# Patient Record
Sex: MALE | Race: WHITE | NOT HISPANIC OR LATINO | Employment: OTHER | URBAN - NONMETROPOLITAN AREA
[De-identification: names, ages, dates, MRNs, and addresses within clinical notes are randomized per-mention and may not be internally consistent; named-entity substitution may affect disease eponyms.]

---

## 2023-01-01 ENCOUNTER — APPOINTMENT (OUTPATIENT)
Dept: RADIOLOGY | Facility: HOSPITAL | Age: 73
End: 2023-01-01
Payer: MEDICARE

## 2023-01-01 ENCOUNTER — APPOINTMENT (OUTPATIENT)
Dept: RADIOLOGY | Facility: HOSPITAL | Age: 73
DRG: 637 | End: 2023-01-01
Payer: MEDICARE

## 2023-01-01 ENCOUNTER — HOSPITAL ENCOUNTER (INPATIENT)
Facility: HOSPITAL | Age: 73
LOS: 1 days | DRG: 951 | End: 2023-11-25
Attending: STUDENT IN AN ORGANIZED HEALTH CARE EDUCATION/TRAINING PROGRAM | Admitting: STUDENT IN AN ORGANIZED HEALTH CARE EDUCATION/TRAINING PROGRAM
Payer: OTHER MISCELLANEOUS

## 2023-01-01 ENCOUNTER — APPOINTMENT (OUTPATIENT)
Dept: CARDIOLOGY | Facility: HOSPITAL | Age: 73
DRG: 637 | End: 2023-01-01
Payer: MEDICARE

## 2023-01-01 ENCOUNTER — HOSPITAL ENCOUNTER (EMERGENCY)
Facility: HOSPITAL | Age: 73
Discharge: OTHER INSTITUTION | End: 2023-11-18
Attending: FAMILY MEDICINE
Payer: MEDICARE

## 2023-01-01 ENCOUNTER — ANCILLARY PROCEDURE (OUTPATIENT)
Dept: RADIOLOGY | Facility: HOSPITAL | Age: 73
DRG: 637 | End: 2023-01-01
Payer: MEDICARE

## 2023-01-01 ENCOUNTER — HOSPITAL ENCOUNTER (OUTPATIENT)
Dept: CARDIOLOGY | Facility: HOSPITAL | Age: 73
Discharge: HOME | End: 2023-11-19
Payer: MEDICARE

## 2023-01-01 ENCOUNTER — HOSPITAL ENCOUNTER (OUTPATIENT)
Dept: CARDIOLOGY | Facility: HOSPITAL | Age: 73
Discharge: HOME | End: 2023-11-21
Payer: MEDICARE

## 2023-01-01 ENCOUNTER — HOSPITAL ENCOUNTER (INPATIENT)
Facility: HOSPITAL | Age: 73
LOS: 7 days | Discharge: HOSPICE | DRG: 637 | End: 2023-11-25
Attending: FAMILY MEDICINE | Admitting: INTERNAL MEDICINE
Payer: MEDICARE

## 2023-01-01 VITALS
WEIGHT: 266.76 LBS | BODY MASS INDEX: 36.13 KG/M2 | SYSTOLIC BLOOD PRESSURE: 137 MMHG | HEART RATE: 102 BPM | RESPIRATION RATE: 18 BRPM | TEMPERATURE: 96.6 F | OXYGEN SATURATION: 99 % | DIASTOLIC BLOOD PRESSURE: 99 MMHG | HEIGHT: 72 IN

## 2023-01-01 VITALS
RESPIRATION RATE: 22 BRPM | DIASTOLIC BLOOD PRESSURE: 75 MMHG | WEIGHT: 289.68 LBS | HEIGHT: 74 IN | BODY MASS INDEX: 37.18 KG/M2 | TEMPERATURE: 102.4 F | SYSTOLIC BLOOD PRESSURE: 152 MMHG | OXYGEN SATURATION: 94 % | HEART RATE: 98 BPM

## 2023-01-01 DIAGNOSIS — E11.10 DKA, TYPE 2, NOT AT GOAL (MULTI): ICD-10-CM

## 2023-01-01 DIAGNOSIS — K92.1 BLACK STOOL: ICD-10-CM
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
## 2023-01-01 DIAGNOSIS — R74.8 ELEVATED CK: ICD-10-CM

## 2023-01-01 DIAGNOSIS — R41.82 ALTERED MENTAL STATUS, UNSPECIFIED ALTERED MENTAL STATUS TYPE: Primary | ICD-10-CM

## 2023-01-01 DIAGNOSIS — T07.XXXA ABRASIONS OF MULTIPLE SITES: ICD-10-CM

## 2023-01-01 DIAGNOSIS — I48.91 ATRIAL FIBRILLATION WITH RAPID VENTRICULAR RESPONSE (MULTI): ICD-10-CM

## 2023-01-01 DIAGNOSIS — R79.89 ELEVATED TROPONIN: ICD-10-CM

## 2023-01-01 DIAGNOSIS — N17.9 ACUTE RENAL FAILURE, UNSPECIFIED ACUTE RENAL FAILURE TYPE (CMS-HCC): ICD-10-CM

## 2023-01-01 DIAGNOSIS — E11.10 DKA, TYPE 2, NOT AT GOAL (MULTI): Primary | ICD-10-CM

## 2023-01-01 DIAGNOSIS — T68.XXXA HYPOTHERMIA, INITIAL ENCOUNTER: ICD-10-CM

## 2023-01-01 DIAGNOSIS — I48.91 ATRIAL FIBRILLATION, UNSPECIFIED TYPE (MULTI): ICD-10-CM

## 2023-01-01 DIAGNOSIS — R73.9 HYPERGLYCEMIA: ICD-10-CM

## 2023-01-01 LAB
ALBUMIN SERPL BCP-MCNC: 2.7 G/DL (ref 3.4–5)
ALBUMIN SERPL BCP-MCNC: 2.8 G/DL (ref 3.4–5)
ALBUMIN SERPL BCP-MCNC: 2.9 G/DL (ref 3.4–5)
ALBUMIN SERPL BCP-MCNC: 2.9 G/DL (ref 3.4–5)
ALBUMIN SERPL BCP-MCNC: 3 G/DL (ref 3.4–5)
ALBUMIN SERPL BCP-MCNC: 3.2 G/DL (ref 3.4–5)
ALBUMIN SERPL BCP-MCNC: 3.3 G/DL (ref 3.4–5)
ALBUMIN SERPL BCP-MCNC: 3.3 G/DL (ref 3.4–5)
ALBUMIN SERPL BCP-MCNC: 3.5 G/DL (ref 3.4–5)
ALBUMIN SERPL BCP-MCNC: 3.6 G/DL (ref 3.4–5)
ALBUMIN SERPL BCP-MCNC: 3.6 G/DL (ref 3.4–5)
ALBUMIN SERPL BCP-MCNC: 3.8 G/DL (ref 3.4–5)
ALBUMIN SERPL BCP-MCNC: 4.3 G/DL (ref 3.4–5)
ALP SERPL-CCNC: 114 U/L (ref 33–136)
ALP SERPL-CCNC: 129 U/L (ref 33–136)
ALP SERPL-CCNC: 138 U/L (ref 33–136)
ALP SERPL-CCNC: 81 U/L (ref 33–136)
ALP SERPL-CCNC: 91 U/L (ref 33–136)
ALT SERPL W P-5'-P-CCNC: 16 U/L (ref 10–52)
ALT SERPL W P-5'-P-CCNC: 17 U/L (ref 10–52)
ALT SERPL W P-5'-P-CCNC: 18 U/L (ref 10–52)
ALT SERPL W P-5'-P-CCNC: 29 U/L (ref 10–52)
ALT SERPL W P-5'-P-CCNC: 57 U/L (ref 10–52)
AMMONIA PLAS-SCNC: 34 UMOL/L (ref 16–53)
ANION GAP BLDA CALCULATED.4IONS-SCNC: 3 MMO/L (ref 10–25)
ANION GAP BLDA CALCULATED.4IONS-SCNC: 9 MMO/L (ref 10–25)
ANION GAP SERPL CALC-SCNC: 11 MMOL/L (ref 10–20)
ANION GAP SERPL CALC-SCNC: 11 MMOL/L (ref 10–20)
ANION GAP SERPL CALC-SCNC: 12 MMOL/L (ref 10–20)
ANION GAP SERPL CALC-SCNC: 13 MMOL/L (ref 10–20)
ANION GAP SERPL CALC-SCNC: 14 MMOL/L (ref 10–20)
ANION GAP SERPL CALC-SCNC: 15 MMOL/L (ref 10–20)
ANION GAP SERPL CALC-SCNC: 19 MMOL/L (ref 10–20)
ANION GAP SERPL CALC-SCNC: 21 MMOL/L (ref 10–20)
ANION GAP SERPL CALC-SCNC: 22 MMOL/L (ref 10–20)
ANION GAP SERPL CALC-SCNC: 27 MMOL/L (ref 10–20)
AORTIC VALVE MEAN GRADIENT: 19
AORTIC VALVE PEAK VELOCITY: 2.93
APPEARANCE UR: CLEAR
APTT PPP: 33 SECONDS (ref 27–38)
AST SERPL W P-5'-P-CCNC: 109 U/L (ref 9–39)
AST SERPL W P-5'-P-CCNC: 21 U/L (ref 9–39)
AST SERPL W P-5'-P-CCNC: 24 U/L (ref 9–39)
AST SERPL W P-5'-P-CCNC: 26 U/L (ref 9–39)
AST SERPL W P-5'-P-CCNC: 40 U/L (ref 9–39)
AV PEAK GRADIENT: 34.3
AVA (PEAK VEL): 1.07
AVA (VTI): 1.25
B-OH-BUTYR SERPL-SCNC: 3.74 MMOL/L (ref 0.02–0.27)
BACTERIA #/AREA URNS AUTO: ABNORMAL /HPF
BACTERIA BLD CULT: NORMAL
BACTERIA BLD CULT: NORMAL
BACTERIA UR CULT: ABNORMAL
BASE EXCESS BLDA CALC-SCNC: -2.2 MMOL/L (ref -2–3)
BASE EXCESS BLDA CALC-SCNC: 7.2 MMOL/L (ref -2–3)
BASE EXCESS BLDV CALC-SCNC: -13 MMOL/L (ref -2–3)
BASE EXCESS BLDV CALC-SCNC: -4.2 MMOL/L (ref -2–3)
BASOPHILS # BLD AUTO: 0.02 X10*3/UL (ref 0–0.1)
BASOPHILS NFR BLD AUTO: 0.1 %
BILIRUB SERPL-MCNC: 0.4 MG/DL (ref 0–1.2)
BILIRUB SERPL-MCNC: 0.5 MG/DL (ref 0–1.2)
BILIRUB SERPL-MCNC: 0.6 MG/DL (ref 0–1.2)
BILIRUB UR STRIP.AUTO-MCNC: NEGATIVE MG/DL
BODY SURFACE AREA: 2.51 M2
BODY TEMPERATURE: ABNORMAL
BUN SERPL-MCNC: 16 MG/DL (ref 6–23)
BUN SERPL-MCNC: 17 MG/DL (ref 6–23)
BUN SERPL-MCNC: 21 MG/DL (ref 6–23)
BUN SERPL-MCNC: 25 MG/DL (ref 6–23)
BUN SERPL-MCNC: 26 MG/DL (ref 6–23)
BUN SERPL-MCNC: 32 MG/DL (ref 6–23)
BUN SERPL-MCNC: 40 MG/DL (ref 6–23)
BUN SERPL-MCNC: 66 MG/DL (ref 6–23)
BUN SERPL-MCNC: 73 MG/DL (ref 6–23)
BUN SERPL-MCNC: 82 MG/DL (ref 6–23)
BUN SERPL-MCNC: 82 MG/DL (ref 6–23)
BUN SERPL-MCNC: 83 MG/DL (ref 6–23)
BUN SERPL-MCNC: 84 MG/DL (ref 6–23)
BUN SERPL-MCNC: 84 MG/DL (ref 6–23)
BUN SERPL-MCNC: 85 MG/DL (ref 6–23)
CA-I BLDA-SCNC: 1.17 MMOL/L (ref 1.1–1.33)
CA-I BLDA-SCNC: 1.41 MMOL/L (ref 1.1–1.33)
CALCIUM SERPL-MCNC: 10.2 MG/DL (ref 8.6–10.3)
CALCIUM SERPL-MCNC: 11 MG/DL (ref 8.6–10.3)
CALCIUM SERPL-MCNC: 8.4 MG/DL (ref 8.6–10.3)
CALCIUM SERPL-MCNC: 8.5 MG/DL (ref 8.6–10.3)
CALCIUM SERPL-MCNC: 8.6 MG/DL (ref 8.6–10.3)
CALCIUM SERPL-MCNC: 8.7 MG/DL (ref 8.6–10.3)
CALCIUM SERPL-MCNC: 8.7 MG/DL (ref 8.6–10.3)
CALCIUM SERPL-MCNC: 9.3 MG/DL (ref 8.6–10.3)
CALCIUM SERPL-MCNC: 9.4 MG/DL (ref 8.6–10.3)
CALCIUM SERPL-MCNC: 9.6 MG/DL (ref 8.6–10.3)
CALCIUM SERPL-MCNC: 9.6 MG/DL (ref 8.6–10.3)
CALCIUM SERPL-MCNC: 9.7 MG/DL (ref 8.6–10.3)
CALCIUM SERPL-MCNC: 9.7 MG/DL (ref 8.6–10.3)
CARDIAC TROPONIN I PNL SERPL HS: 79 NG/L (ref 0–20)
CARDIAC TROPONIN I PNL SERPL HS: 88 NG/L (ref 0–20)
CHLORIDE BLDA-SCNC: 105 MMOL/L (ref 98–107)
CHLORIDE BLDA-SCNC: 121 MMOL/L (ref 98–107)
CHLORIDE SERPL-SCNC: 103 MMOL/L (ref 98–107)
CHLORIDE SERPL-SCNC: 107 MMOL/L (ref 98–107)
CHLORIDE SERPL-SCNC: 107 MMOL/L (ref 98–107)
CHLORIDE SERPL-SCNC: 110 MMOL/L (ref 98–107)
CHLORIDE SERPL-SCNC: 112 MMOL/L (ref 98–107)
CHLORIDE SERPL-SCNC: 114 MMOL/L (ref 98–107)
CHLORIDE SERPL-SCNC: 115 MMOL/L (ref 98–107)
CHLORIDE SERPL-SCNC: 116 MMOL/L (ref 98–107)
CHLORIDE SERPL-SCNC: 116 MMOL/L (ref 98–107)
CHLORIDE SERPL-SCNC: 117 MMOL/L (ref 98–107)
CHLORIDE SERPL-SCNC: 119 MMOL/L (ref 98–107)
CHLORIDE SERPL-SCNC: 120 MMOL/L (ref 98–107)
CHLORIDE SERPL-SCNC: 99 MMOL/L (ref 98–107)
CK SERPL-CCNC: 1241 U/L (ref 0–325)
CO2 SERPL-SCNC: 15 MMOL/L (ref 21–32)
CO2 SERPL-SCNC: 15 MMOL/L (ref 21–32)
CO2 SERPL-SCNC: 16 MMOL/L (ref 21–32)
CO2 SERPL-SCNC: 19 MMOL/L (ref 21–32)
CO2 SERPL-SCNC: 20 MMOL/L (ref 21–32)
CO2 SERPL-SCNC: 21 MMOL/L (ref 21–32)
CO2 SERPL-SCNC: 23 MMOL/L (ref 21–32)
CO2 SERPL-SCNC: 23 MMOL/L (ref 21–32)
CO2 SERPL-SCNC: 25 MMOL/L (ref 21–32)
CO2 SERPL-SCNC: 26 MMOL/L (ref 21–32)
CO2 SERPL-SCNC: 26 MMOL/L (ref 21–32)
CO2 SERPL-SCNC: 27 MMOL/L (ref 21–32)
CO2 SERPL-SCNC: 29 MMOL/L (ref 21–32)
COLOR UR: YELLOW
CREAT SERPL-MCNC: 0.94 MG/DL (ref 0.5–1.3)
CREAT SERPL-MCNC: 0.97 MG/DL (ref 0.5–1.3)
CREAT SERPL-MCNC: 0.98 MG/DL (ref 0.5–1.3)
CREAT SERPL-MCNC: 0.99 MG/DL (ref 0.5–1.3)
CREAT SERPL-MCNC: 1.02 MG/DL (ref 0.5–1.3)
CREAT SERPL-MCNC: 1.05 MG/DL (ref 0.5–1.3)
CREAT SERPL-MCNC: 1.14 MG/DL (ref 0.5–1.3)
CREAT SERPL-MCNC: 1.53 MG/DL (ref 0.5–1.3)
CREAT SERPL-MCNC: 1.75 MG/DL (ref 0.5–1.3)
CREAT SERPL-MCNC: 2.16 MG/DL (ref 0.5–1.3)
CREAT SERPL-MCNC: 2.27 MG/DL (ref 0.5–1.3)
CREAT SERPL-MCNC: 2.42 MG/DL (ref 0.5–1.3)
CREAT SERPL-MCNC: 2.59 MG/DL (ref 0.5–1.3)
CREAT SERPL-MCNC: 2.68 MG/DL (ref 0.5–1.3)
CREAT SERPL-MCNC: 2.89 MG/DL (ref 0.5–1.3)
CRP SERPL-MCNC: 18.14 MG/DL
EOSINOPHIL # BLD AUTO: 0 X10*3/UL (ref 0–0.4)
EOSINOPHIL NFR BLD AUTO: 0 %
ERYTHROCYTE [DISTWIDTH] IN BLOOD BY AUTOMATED COUNT: 14.7 % (ref 11.5–14.5)
ERYTHROCYTE [DISTWIDTH] IN BLOOD BY AUTOMATED COUNT: 14.7 % (ref 11.5–14.5)
ERYTHROCYTE [DISTWIDTH] IN BLOOD BY AUTOMATED COUNT: 15 % (ref 11.5–14.5)
ERYTHROCYTE [DISTWIDTH] IN BLOOD BY AUTOMATED COUNT: 15 % (ref 11.5–14.5)
ERYTHROCYTE [DISTWIDTH] IN BLOOD BY AUTOMATED COUNT: 15.4 % (ref 11.5–14.5)
ERYTHROCYTE [DISTWIDTH] IN BLOOD BY AUTOMATED COUNT: 15.4 % (ref 11.5–14.5)
ERYTHROCYTE [DISTWIDTH] IN BLOOD BY AUTOMATED COUNT: 15.5 % (ref 11.5–14.5)
ERYTHROCYTE [DISTWIDTH] IN BLOOD BY AUTOMATED COUNT: 15.6 % (ref 11.5–14.5)
ERYTHROCYTE [SEDIMENTATION RATE] IN BLOOD BY WESTERGREN METHOD: 70 MM/H (ref 0–20)
EST. AVERAGE GLUCOSE BLD GHB EST-MCNC: 246 MG/DL
GFR SERPL CREATININE-BSD FRML MDRD: 22 ML/MIN/1.73M*2
GFR SERPL CREATININE-BSD FRML MDRD: 24 ML/MIN/1.73M*2
GFR SERPL CREATININE-BSD FRML MDRD: 26 ML/MIN/1.73M*2
GFR SERPL CREATININE-BSD FRML MDRD: 28 ML/MIN/1.73M*2
GFR SERPL CREATININE-BSD FRML MDRD: 30 ML/MIN/1.73M*2
GFR SERPL CREATININE-BSD FRML MDRD: 32 ML/MIN/1.73M*2
GFR SERPL CREATININE-BSD FRML MDRD: 41 ML/MIN/1.73M*2
GFR SERPL CREATININE-BSD FRML MDRD: 48 ML/MIN/1.73M*2
GFR SERPL CREATININE-BSD FRML MDRD: 68 ML/MIN/1.73M*2
GFR SERPL CREATININE-BSD FRML MDRD: 75 ML/MIN/1.73M*2
GFR SERPL CREATININE-BSD FRML MDRD: 78 ML/MIN/1.73M*2
GFR SERPL CREATININE-BSD FRML MDRD: 81 ML/MIN/1.73M*2
GFR SERPL CREATININE-BSD FRML MDRD: 82 ML/MIN/1.73M*2
GFR SERPL CREATININE-BSD FRML MDRD: 83 ML/MIN/1.73M*2
GFR SERPL CREATININE-BSD FRML MDRD: 86 ML/MIN/1.73M*2
GLUCOSE BLD MANUAL STRIP-MCNC: 107 MG/DL (ref 74–99)
GLUCOSE BLD MANUAL STRIP-MCNC: 111 MG/DL (ref 74–99)
GLUCOSE BLD MANUAL STRIP-MCNC: 116 MG/DL (ref 74–99)
GLUCOSE BLD MANUAL STRIP-MCNC: 121 MG/DL (ref 74–99)
GLUCOSE BLD MANUAL STRIP-MCNC: 122 MG/DL (ref 74–99)
GLUCOSE BLD MANUAL STRIP-MCNC: 130 MG/DL (ref 74–99)
GLUCOSE BLD MANUAL STRIP-MCNC: 138 MG/DL (ref 74–99)
GLUCOSE BLD MANUAL STRIP-MCNC: 148 MG/DL (ref 74–99)
GLUCOSE BLD MANUAL STRIP-MCNC: 161 MG/DL (ref 74–99)
GLUCOSE BLD MANUAL STRIP-MCNC: 163 MG/DL (ref 74–99)
GLUCOSE BLD MANUAL STRIP-MCNC: 165 MG/DL (ref 74–99)
GLUCOSE BLD MANUAL STRIP-MCNC: 173 MG/DL (ref 74–99)
GLUCOSE BLD MANUAL STRIP-MCNC: 177 MG/DL (ref 74–99)
GLUCOSE BLD MANUAL STRIP-MCNC: 186 MG/DL (ref 74–99)
GLUCOSE BLD MANUAL STRIP-MCNC: 187 MG/DL (ref 74–99)
GLUCOSE BLD MANUAL STRIP-MCNC: 196 MG/DL (ref 74–99)
GLUCOSE BLD MANUAL STRIP-MCNC: 206 MG/DL (ref 74–99)
GLUCOSE BLD MANUAL STRIP-MCNC: 207 MG/DL (ref 74–99)
GLUCOSE BLD MANUAL STRIP-MCNC: 214 MG/DL (ref 74–99)
GLUCOSE BLD MANUAL STRIP-MCNC: 217 MG/DL (ref 74–99)
GLUCOSE BLD MANUAL STRIP-MCNC: 218 MG/DL (ref 74–99)
GLUCOSE BLD MANUAL STRIP-MCNC: 219 MG/DL (ref 74–99)
GLUCOSE BLD MANUAL STRIP-MCNC: 222 MG/DL (ref 74–99)
GLUCOSE BLD MANUAL STRIP-MCNC: 222 MG/DL (ref 74–99)
GLUCOSE BLD MANUAL STRIP-MCNC: 223 MG/DL (ref 74–99)
GLUCOSE BLD MANUAL STRIP-MCNC: 224 MG/DL (ref 74–99)
GLUCOSE BLD MANUAL STRIP-MCNC: 226 MG/DL (ref 74–99)
GLUCOSE BLD MANUAL STRIP-MCNC: 226 MG/DL (ref 74–99)
GLUCOSE BLD MANUAL STRIP-MCNC: 228 MG/DL (ref 74–99)
GLUCOSE BLD MANUAL STRIP-MCNC: 230 MG/DL (ref 74–99)
GLUCOSE BLD MANUAL STRIP-MCNC: 230 MG/DL (ref 74–99)
GLUCOSE BLD MANUAL STRIP-MCNC: 231 MG/DL (ref 74–99)
GLUCOSE BLD MANUAL STRIP-MCNC: 233 MG/DL (ref 74–99)
GLUCOSE BLD MANUAL STRIP-MCNC: 234 MG/DL (ref 74–99)
GLUCOSE BLD MANUAL STRIP-MCNC: 237 MG/DL (ref 74–99)
GLUCOSE BLD MANUAL STRIP-MCNC: 239 MG/DL (ref 74–99)
GLUCOSE BLD MANUAL STRIP-MCNC: 239 MG/DL (ref 74–99)
GLUCOSE BLD MANUAL STRIP-MCNC: 240 MG/DL (ref 74–99)
GLUCOSE BLD MANUAL STRIP-MCNC: 241 MG/DL (ref 74–99)
GLUCOSE BLD MANUAL STRIP-MCNC: 243 MG/DL (ref 74–99)
GLUCOSE BLD MANUAL STRIP-MCNC: 249 MG/DL (ref 74–99)
GLUCOSE BLD MANUAL STRIP-MCNC: 250 MG/DL (ref 74–99)
GLUCOSE BLD MANUAL STRIP-MCNC: 250 MG/DL (ref 74–99)
GLUCOSE BLD MANUAL STRIP-MCNC: 253 MG/DL (ref 74–99)
GLUCOSE BLD MANUAL STRIP-MCNC: 255 MG/DL (ref 74–99)
GLUCOSE BLD MANUAL STRIP-MCNC: 265 MG/DL (ref 74–99)
GLUCOSE BLD MANUAL STRIP-MCNC: 267 MG/DL (ref 74–99)
GLUCOSE BLD MANUAL STRIP-MCNC: 267 MG/DL (ref 74–99)
GLUCOSE BLD MANUAL STRIP-MCNC: 269 MG/DL (ref 74–99)
GLUCOSE BLD MANUAL STRIP-MCNC: 271 MG/DL (ref 74–99)
GLUCOSE BLD MANUAL STRIP-MCNC: 283 MG/DL (ref 74–99)
GLUCOSE BLD MANUAL STRIP-MCNC: 288 MG/DL (ref 74–99)
GLUCOSE BLD MANUAL STRIP-MCNC: 296 MG/DL (ref 74–99)
GLUCOSE BLD MANUAL STRIP-MCNC: 319 MG/DL (ref 74–99)
GLUCOSE BLD MANUAL STRIP-MCNC: 409 MG/DL (ref 74–99)
GLUCOSE BLD MANUAL STRIP-MCNC: 448 MG/DL (ref 74–99)
GLUCOSE BLD MANUAL STRIP-MCNC: 460 MG/DL (ref 74–99)
GLUCOSE BLD MANUAL STRIP-MCNC: 484 MG/DL (ref 74–99)
GLUCOSE BLD MANUAL STRIP-MCNC: 575 MG/DL (ref 74–99)
GLUCOSE BLD MANUAL STRIP-MCNC: 61 MG/DL (ref 74–99)
GLUCOSE BLD MANUAL STRIP-MCNC: 73 MG/DL (ref 74–99)
GLUCOSE BLD MANUAL STRIP-MCNC: 74 MG/DL (ref 74–99)
GLUCOSE BLD MANUAL STRIP-MCNC: 89 MG/DL (ref 74–99)
GLUCOSE BLD MANUAL STRIP-MCNC: 96 MG/DL (ref 74–99)
GLUCOSE BLD MANUAL STRIP-MCNC: 98 MG/DL (ref 74–99)
GLUCOSE BLD MANUAL STRIP-MCNC: >600 MG/DL (ref 74–99)
GLUCOSE BLD MANUAL STRIP-MCNC: >600 MG/DL (ref 74–99)
GLUCOSE BLDA-MCNC: 167 MG/DL (ref 74–99)
GLUCOSE BLDA-MCNC: 277 MG/DL (ref 74–99)
GLUCOSE SERPL-MCNC: 127 MG/DL (ref 74–99)
GLUCOSE SERPL-MCNC: 219 MG/DL (ref 74–99)
GLUCOSE SERPL-MCNC: 227 MG/DL (ref 74–99)
GLUCOSE SERPL-MCNC: 240 MG/DL (ref 74–99)
GLUCOSE SERPL-MCNC: 241 MG/DL (ref 74–99)
GLUCOSE SERPL-MCNC: 248 MG/DL (ref 74–99)
GLUCOSE SERPL-MCNC: 273 MG/DL (ref 74–99)
GLUCOSE SERPL-MCNC: 291 MG/DL (ref 74–99)
GLUCOSE SERPL-MCNC: 301 MG/DL (ref 74–99)
GLUCOSE SERPL-MCNC: 397 MG/DL (ref 74–99)
GLUCOSE SERPL-MCNC: 630 MG/DL (ref 74–99)
GLUCOSE SERPL-MCNC: 683 MG/DL (ref 74–99)
GLUCOSE SERPL-MCNC: 807 MG/DL (ref 74–99)
GLUCOSE SERPL-MCNC: 88 MG/DL (ref 74–99)
GLUCOSE SERPL-MCNC: 95 MG/DL (ref 74–99)
GLUCOSE UR STRIP.AUTO-MCNC: ABNORMAL MG/DL
HBA1C MFR BLD: 10.2 %
HCO3 BLDA-SCNC: 20.6 MMOL/L (ref 22–26)
HCO3 BLDA-SCNC: 29.7 MMOL/L (ref 22–26)
HCO3 BLDV-SCNC: 15.3 MMOL/L (ref 22–26)
HCO3 BLDV-SCNC: 22.6 MMOL/L (ref 22–26)
HCT VFR BLD AUTO: 34 % (ref 41–52)
HCT VFR BLD AUTO: 34.7 % (ref 41–52)
HCT VFR BLD AUTO: 36.5 % (ref 41–52)
HCT VFR BLD AUTO: 37 % (ref 41–52)
HCT VFR BLD AUTO: 38.1 % (ref 41–52)
HCT VFR BLD AUTO: 40.1 % (ref 41–52)
HCT VFR BLD AUTO: 43.2 % (ref 41–52)
HCT VFR BLD AUTO: 50.6 % (ref 41–52)
HCT VFR BLD EST: 35 % (ref 41–52)
HCT VFR BLD EST: 37 % (ref 41–52)
HEMOCCULT SP1 STL QL: NEGATIVE
HGB BLD-MCNC: 10 G/DL (ref 13.5–17.5)
HGB BLD-MCNC: 10.4 G/DL (ref 13.5–17.5)
HGB BLD-MCNC: 10.9 G/DL (ref 13.5–17.5)
HGB BLD-MCNC: 11 G/DL (ref 13.5–17.5)
HGB BLD-MCNC: 11.5 G/DL (ref 13.5–17.5)
HGB BLD-MCNC: 12.2 G/DL (ref 13.5–17.5)
HGB BLD-MCNC: 13.3 G/DL (ref 13.5–17.5)
HGB BLD-MCNC: 15.4 G/DL (ref 13.5–17.5)
HGB BLDA-MCNC: 11.7 G/DL (ref 13.5–17.5)
HGB BLDA-MCNC: 12.4 G/DL (ref 13.5–17.5)
HOLD SPECIMEN: NORMAL
HOLD SPECIMEN: NORMAL
HYALINE CASTS #/AREA URNS AUTO: ABNORMAL /LPF
IMM GRANULOCYTES # BLD AUTO: 0.08 X10*3/UL (ref 0–0.5)
IMM GRANULOCYTES NFR BLD AUTO: 0.5 % (ref 0–0.9)
INHALED O2 CONCENTRATION: 100 %
INHALED O2 CONCENTRATION: 28 %
INHALED O2 CONCENTRATION: 35 %
INR PPP: 1 (ref 0.9–1.1)
INR PPP: 1.4 (ref 0.9–1.1)
KETONES UR STRIP.AUTO-MCNC: ABNORMAL MG/DL
LACTATE BLDA-SCNC: 1.2 MMOL/L (ref 0.4–2)
LACTATE BLDA-SCNC: 1.8 MMOL/L (ref 0.4–2)
LACTATE SERPL-SCNC: 1.6 MMOL/L (ref 0.4–2)
LACTATE SERPL-SCNC: 1.9 MMOL/L (ref 0.4–2)
LACTATE SERPL-SCNC: 2.5 MMOL/L (ref 0.4–2)
LACTATE SERPL-SCNC: 3 MMOL/L (ref 0.4–2)
LEFT VENTRICLE INTERNAL DIMENSION DIASTOLE: 5.07 (ref 3.5–6)
LEFT VENTRICULAR OUTFLOW TRACT DIAMETER: 2.3
LEUKOCYTE ESTERASE UR QL STRIP.AUTO: NEGATIVE
LYMPHOCYTES # BLD AUTO: 0.46 X10*3/UL (ref 0.8–3)
LYMPHOCYTES NFR BLD AUTO: 3.1 %
MAGNESIUM SERPL-MCNC: 1.75 MG/DL (ref 1.6–2.4)
MAGNESIUM SERPL-MCNC: 1.8 MG/DL (ref 1.6–2.4)
MAGNESIUM SERPL-MCNC: 1.83 MG/DL (ref 1.6–2.4)
MAGNESIUM SERPL-MCNC: 1.95 MG/DL (ref 1.6–2.4)
MAGNESIUM SERPL-MCNC: 2.16 MG/DL (ref 1.6–2.4)
MAGNESIUM SERPL-MCNC: 2.24 MG/DL (ref 1.6–2.4)
MAGNESIUM SERPL-MCNC: 2.46 MG/DL (ref 1.6–2.4)
MAGNESIUM SERPL-MCNC: 2.48 MG/DL (ref 1.6–2.4)
MAGNESIUM SERPL-MCNC: 2.94 MG/DL (ref 1.6–2.4)
MCH RBC QN AUTO: 23.9 PG (ref 26–34)
MCH RBC QN AUTO: 23.9 PG (ref 26–34)
MCH RBC QN AUTO: 24.1 PG (ref 26–34)
MCH RBC QN AUTO: 24.2 PG (ref 26–34)
MCH RBC QN AUTO: 24.6 PG (ref 26–34)
MCH RBC QN AUTO: 24.6 PG (ref 26–34)
MCH RBC QN AUTO: 24.7 PG (ref 26–34)
MCH RBC QN AUTO: 25 PG (ref 26–34)
MCHC RBC AUTO-ENTMCNC: 29.4 G/DL (ref 32–36)
MCHC RBC AUTO-ENTMCNC: 29.7 G/DL (ref 32–36)
MCHC RBC AUTO-ENTMCNC: 29.9 G/DL (ref 32–36)
MCHC RBC AUTO-ENTMCNC: 30 G/DL (ref 32–36)
MCHC RBC AUTO-ENTMCNC: 30.2 G/DL (ref 32–36)
MCHC RBC AUTO-ENTMCNC: 30.4 G/DL (ref 32–36)
MCHC RBC AUTO-ENTMCNC: 30.4 G/DL (ref 32–36)
MCHC RBC AUTO-ENTMCNC: 30.8 G/DL (ref 32–36)
MCV RBC AUTO: 79 FL (ref 80–100)
MCV RBC AUTO: 80 FL (ref 80–100)
MCV RBC AUTO: 81 FL (ref 80–100)
MCV RBC AUTO: 82 FL (ref 80–100)
MCV RBC AUTO: 82 FL (ref 80–100)
MITRAL VALVE E/A RATIO: 1
MONOCYTES # BLD AUTO: 0.82 X10*3/UL (ref 0.05–0.8)
MONOCYTES NFR BLD AUTO: 5.5 %
NEUTROPHILS # BLD AUTO: 13.59 X10*3/UL (ref 1.6–5.5)
NEUTROPHILS NFR BLD AUTO: 90.8 %
NITRITE UR QL STRIP.AUTO: NEGATIVE
NRBC BLD-RTO: 0 /100 WBCS (ref 0–0)
NRBC BLD-RTO: 0.2 /100 WBCS (ref 0–0)
OXYHGB MFR BLDA: 94.2 % (ref 94–98)
OXYHGB MFR BLDA: 95.9 % (ref 94–98)
OXYHGB MFR BLDV: 47.6 % (ref 45–75)
OXYHGB MFR BLDV: 49.8 % (ref 45–75)
PCO2 BLDA: 29 MM HG (ref 38–42)
PCO2 BLDA: 34 MM HG (ref 38–42)
PCO2 BLDV: 43 MM HG (ref 41–51)
PCO2 BLDV: 47 MM HG (ref 41–51)
PH BLDA: 7.46 PH (ref 7.38–7.42)
PH BLDA: 7.55 PH (ref 7.38–7.42)
PH BLDV: 7.16 PH (ref 7.33–7.43)
PH BLDV: 7.29 PH (ref 7.33–7.43)
PH UR STRIP.AUTO: 5 [PH]
PHOSPHATE SERPL-MCNC: 1.7 MG/DL (ref 2.5–4.9)
PHOSPHATE SERPL-MCNC: 1.9 MG/DL (ref 2.5–4.9)
PHOSPHATE SERPL-MCNC: 1.9 MG/DL (ref 2.5–4.9)
PHOSPHATE SERPL-MCNC: 2.3 MG/DL (ref 2.5–4.9)
PHOSPHATE SERPL-MCNC: 2.4 MG/DL (ref 2.5–4.9)
PHOSPHATE SERPL-MCNC: 2.7 MG/DL (ref 2.5–4.9)
PHOSPHATE SERPL-MCNC: 3 MG/DL (ref 2.5–4.9)
PHOSPHATE SERPL-MCNC: 3.3 MG/DL (ref 2.5–4.9)
PHOSPHATE SERPL-MCNC: 3.4 MG/DL (ref 2.5–4.9)
PHOSPHATE SERPL-MCNC: 3.8 MG/DL (ref 2.5–4.9)
PHOSPHATE SERPL-MCNC: 3.9 MG/DL (ref 2.5–4.9)
PHOSPHATE SERPL-MCNC: 5.1 MG/DL (ref 2.5–4.9)
PLATELET # BLD AUTO: 190 X10*3/UL (ref 150–450)
PLATELET # BLD AUTO: 199 X10*3/UL (ref 150–450)
PLATELET # BLD AUTO: 205 X10*3/UL (ref 150–450)
PLATELET # BLD AUTO: 205 X10*3/UL (ref 150–450)
PLATELET # BLD AUTO: 221 X10*3/UL (ref 150–450)
PLATELET # BLD AUTO: 270 X10*3/UL (ref 150–450)
PLATELET # BLD AUTO: 331 X10*3/UL (ref 150–450)
PLATELET # BLD AUTO: 440 X10*3/UL (ref 150–450)
PO2 BLDA: 73 MM HG (ref 85–95)
PO2 BLDA: 84 MM HG (ref 85–95)
PO2 BLDV: 35 MM HG (ref 35–45)
PO2 BLDV: 36 MM HG (ref 35–45)
POTASSIUM BLDA-SCNC: 4 MMOL/L (ref 3.5–5.3)
POTASSIUM BLDA-SCNC: 4.4 MMOL/L (ref 3.5–5.3)
POTASSIUM SERPL-SCNC: 3.3 MMOL/L (ref 3.5–5.3)
POTASSIUM SERPL-SCNC: 3.4 MMOL/L (ref 3.5–5.3)
POTASSIUM SERPL-SCNC: 3.5 MMOL/L (ref 3.5–5.3)
POTASSIUM SERPL-SCNC: 3.6 MMOL/L (ref 3.5–5.3)
POTASSIUM SERPL-SCNC: 3.7 MMOL/L (ref 3.5–5.3)
POTASSIUM SERPL-SCNC: 3.8 MMOL/L (ref 3.5–5.3)
POTASSIUM SERPL-SCNC: 3.9 MMOL/L (ref 3.5–5.3)
POTASSIUM SERPL-SCNC: 4 MMOL/L (ref 3.5–5.3)
POTASSIUM SERPL-SCNC: 4.1 MMOL/L (ref 3.5–5.3)
POTASSIUM SERPL-SCNC: 4.1 MMOL/L (ref 3.5–5.3)
POTASSIUM SERPL-SCNC: 4.2 MMOL/L (ref 3.5–5.3)
POTASSIUM SERPL-SCNC: 4.3 MMOL/L (ref 3.5–5.3)
PROCALCITONIN SERPL-MCNC: 0.15 NG/ML
PROT SERPL-MCNC: 5.7 G/DL (ref 6.4–8.2)
PROT SERPL-MCNC: 6 G/DL (ref 6.4–8.2)
PROT SERPL-MCNC: 6.4 G/DL (ref 6.4–8.2)
PROT SERPL-MCNC: 7.4 G/DL (ref 6.4–8.2)
PROT SERPL-MCNC: 8.3 G/DL (ref 6.4–8.2)
PROT UR STRIP.AUTO-MCNC: ABNORMAL MG/DL
PROTHROMBIN TIME: 11.5 SECONDS (ref 9.8–12.8)
PROTHROMBIN TIME: 15.4 SECONDS (ref 9.8–12.8)
RBC # BLD AUTO: 4.18 X10*6/UL (ref 4.5–5.9)
RBC # BLD AUTO: 4.23 X10*6/UL (ref 4.5–5.9)
RBC # BLD AUTO: 4.5 X10*6/UL (ref 4.5–5.9)
RBC # BLD AUTO: 4.6 X10*6/UL (ref 4.5–5.9)
RBC # BLD AUTO: 4.65 X10*6/UL (ref 4.5–5.9)
RBC # BLD AUTO: 5.07 X10*6/UL (ref 4.5–5.9)
RBC # BLD AUTO: 5.33 X10*6/UL (ref 4.5–5.9)
RBC # BLD AUTO: 6.25 X10*6/UL (ref 4.5–5.9)
RBC # UR STRIP.AUTO: ABNORMAL /UL
RBC #/AREA URNS AUTO: ABNORMAL /HPF
RIGHT VENTRICLE FREE WALL PEAK S': 11
RIGHT VENTRICLE PEAK SYSTOLIC PRESSURE: 22.9
SAO2 % BLDA: 97 % (ref 94–100)
SAO2 % BLDA: 98 % (ref 94–100)
SAO2 % BLDV: 48 % (ref 45–75)
SAO2 % BLDV: 51 % (ref 45–75)
SARS-COV-2 RNA RESP QL NAA+PROBE: NOT DETECTED
SODIUM BLDA-SCNC: 133 MMOL/L (ref 136–145)
SODIUM BLDA-SCNC: 147 MMOL/L (ref 136–145)
SODIUM SERPL-SCNC: 138 MMOL/L (ref 136–145)
SODIUM SERPL-SCNC: 139 MMOL/L (ref 136–145)
SODIUM SERPL-SCNC: 140 MMOL/L (ref 136–145)
SODIUM SERPL-SCNC: 142 MMOL/L (ref 136–145)
SODIUM SERPL-SCNC: 143 MMOL/L (ref 136–145)
SODIUM SERPL-SCNC: 145 MMOL/L (ref 136–145)
SODIUM SERPL-SCNC: 146 MMOL/L (ref 136–145)
SODIUM SERPL-SCNC: 147 MMOL/L (ref 136–145)
SODIUM SERPL-SCNC: 148 MMOL/L (ref 136–145)
SODIUM SERPL-SCNC: 149 MMOL/L (ref 136–145)
SODIUM SERPL-SCNC: 150 MMOL/L (ref 136–145)
SODIUM SERPL-SCNC: 150 MMOL/L (ref 136–145)
SODIUM SERPL-SCNC: 154 MMOL/L (ref 136–145)
SP GR UR STRIP.AUTO: 1.02
SQUAMOUS #/AREA URNS AUTO: ABNORMAL /HPF
TEST COMMENT: ABNORMAL
TEST COMMENT: ABNORMAL
TRICUSPID ANNULAR PLANE SYSTOLIC EXCURSION: 1.1
UFH PPP CHRO-ACNC: 0.3 IU/ML
UFH PPP CHRO-ACNC: 0.4 IU/ML
UFH PPP CHRO-ACNC: 0.5 IU/ML
UROBILINOGEN UR STRIP.AUTO-MCNC: <2 MG/DL
VANCOMYCIN SERPL-MCNC: 13.2 UG/ML (ref 5–20)
VANCOMYCIN SERPL-MCNC: 7.7 UG/ML (ref 5–20)
WBC # BLD AUTO: 10.2 X10*3/UL (ref 4.4–11.3)
WBC # BLD AUTO: 10.4 X10*3/UL (ref 4.4–11.3)
WBC # BLD AUTO: 11.3 X10*3/UL (ref 4.4–11.3)
WBC # BLD AUTO: 15 X10*3/UL (ref 4.4–11.3)
WBC # BLD AUTO: 15.4 X10*3/UL (ref 4.4–11.3)
WBC # BLD AUTO: 9 X10*3/UL (ref 4.4–11.3)
WBC # BLD AUTO: 9.4 X10*3/UL (ref 4.4–11.3)
WBC # BLD AUTO: 9.9 X10*3/UL (ref 4.4–11.3)
WBC #/AREA URNS AUTO: ABNORMAL /HPF

## 2023-01-01 PROCEDURE — 36415 COLL VENOUS BLD VENIPUNCTURE: CPT | Performed by: INTERNAL MEDICINE

## 2023-01-01 PROCEDURE — 84075 ASSAY ALKALINE PHOSPHATASE: CPT

## 2023-01-01 PROCEDURE — 2500000001 HC RX 250 WO HCPCS SELF ADMINISTERED DRUGS (ALT 637 FOR MEDICARE OP)

## 2023-01-01 PROCEDURE — 70450 CT HEAD/BRAIN W/O DYE: CPT

## 2023-01-01 PROCEDURE — 2500000004 HC RX 250 GENERAL PHARMACY W/ HCPCS (ALT 636 FOR OP/ED)

## 2023-01-01 PROCEDURE — 36600 WITHDRAWAL OF ARTERIAL BLOOD: CPT

## 2023-01-01 PROCEDURE — 80053 COMPREHEN METABOLIC PANEL: CPT

## 2023-01-01 PROCEDURE — 36415 COLL VENOUS BLD VENIPUNCTURE: CPT

## 2023-01-01 PROCEDURE — 85652 RBC SED RATE AUTOMATED: CPT

## 2023-01-01 PROCEDURE — 2500000002 HC RX 250 W HCPCS SELF ADMINISTERED DRUGS (ALT 637 FOR MEDICARE OP, ALT 636 FOR OP/ED)

## 2023-01-01 PROCEDURE — 85025 COMPLETE CBC W/AUTO DIFF WBC: CPT | Mod: 59 | Performed by: FAMILY MEDICINE

## 2023-01-01 PROCEDURE — 2500000005 HC RX 250 GENERAL PHARMACY W/O HCPCS

## 2023-01-01 PROCEDURE — 93306 TTE W/DOPPLER COMPLETE: CPT | Performed by: STUDENT IN AN ORGANIZED HEALTH CARE EDUCATION/TRAINING PROGRAM

## 2023-01-01 PROCEDURE — 80069 RENAL FUNCTION PANEL: CPT

## 2023-01-01 PROCEDURE — 74176 CT ABD & PELVIS W/O CONTRAST: CPT

## 2023-01-01 PROCEDURE — 83735 ASSAY OF MAGNESIUM: CPT

## 2023-01-01 PROCEDURE — 96365 THER/PROPH/DIAG IV INF INIT: CPT

## 2023-01-01 PROCEDURE — 82947 ASSAY GLUCOSE BLOOD QUANT: CPT

## 2023-01-01 PROCEDURE — 36415 COLL VENOUS BLD VENIPUNCTURE: CPT | Performed by: FAMILY MEDICINE

## 2023-01-01 PROCEDURE — 80202 ASSAY OF VANCOMYCIN: CPT

## 2023-01-01 PROCEDURE — 87040 BLOOD CULTURE FOR BACTERIA: CPT | Mod: GEALAB

## 2023-01-01 PROCEDURE — 84145 PROCALCITONIN (PCT): CPT | Mod: GEALAB

## 2023-01-01 PROCEDURE — 2020000001 HC ICU ROOM DAILY

## 2023-01-01 PROCEDURE — 76377 3D RENDER W/INTRP POSTPROCES: CPT

## 2023-01-01 PROCEDURE — 74176 CT ABD & PELVIS W/O CONTRAST: CPT | Performed by: STUDENT IN AN ORGANIZED HEALTH CARE EDUCATION/TRAINING PROGRAM

## 2023-01-01 PROCEDURE — 96374 THER/PROPH/DIAG INJ IV PUSH: CPT

## 2023-01-01 PROCEDURE — 84484 ASSAY OF TROPONIN QUANT: CPT | Performed by: FAMILY MEDICINE

## 2023-01-01 PROCEDURE — 1200000002 HC GENERAL ROOM WITH TELEMETRY DAILY

## 2023-01-01 PROCEDURE — 96361 HYDRATE IV INFUSION ADD-ON: CPT

## 2023-01-01 PROCEDURE — 85610 PROTHROMBIN TIME: CPT | Performed by: INTERNAL MEDICINE

## 2023-01-01 PROCEDURE — 92610 EVALUATE SWALLOWING FUNCTION: CPT | Mod: GN

## 2023-01-01 PROCEDURE — 99291 CRITICAL CARE FIRST HOUR: CPT | Performed by: INTERNAL MEDICINE

## 2023-01-01 PROCEDURE — 80053 COMPREHEN METABOLIC PANEL: CPT | Performed by: FAMILY MEDICINE

## 2023-01-01 PROCEDURE — 2500000005 HC RX 250 GENERAL PHARMACY W/O HCPCS: Performed by: INTERNAL MEDICINE

## 2023-01-01 PROCEDURE — 83036 HEMOGLOBIN GLYCOSYLATED A1C: CPT | Mod: GEALAB

## 2023-01-01 PROCEDURE — 84100 ASSAY OF PHOSPHORUS: CPT

## 2023-01-01 PROCEDURE — 84132 ASSAY OF SERUM POTASSIUM: CPT

## 2023-01-01 PROCEDURE — 99221 1ST HOSP IP/OBS SF/LOW 40: CPT | Performed by: SURGERY

## 2023-01-01 PROCEDURE — 2500000004 HC RX 250 GENERAL PHARMACY W/ HCPCS (ALT 636 FOR OP/ED): Performed by: INTERNAL MEDICINE

## 2023-01-01 PROCEDURE — 94761 N-INVAS EAR/PLS OXIMETRY MLT: CPT

## 2023-01-01 PROCEDURE — 87635 SARS-COV-2 COVID-19 AMP PRB: CPT | Performed by: FAMILY MEDICINE

## 2023-01-01 PROCEDURE — 71045 X-RAY EXAM CHEST 1 VIEW: CPT

## 2023-01-01 PROCEDURE — 82805 BLOOD GASES W/O2 SATURATION: CPT

## 2023-01-01 PROCEDURE — 93005 ELECTROCARDIOGRAM TRACING: CPT

## 2023-01-01 PROCEDURE — 71045 X-RAY EXAM CHEST 1 VIEW: CPT | Performed by: STUDENT IN AN ORGANIZED HEALTH CARE EDUCATION/TRAINING PROGRAM

## 2023-01-01 PROCEDURE — 83605 ASSAY OF LACTIC ACID: CPT | Performed by: FAMILY MEDICINE

## 2023-01-01 PROCEDURE — 85027 COMPLETE CBC AUTOMATED: CPT

## 2023-01-01 PROCEDURE — 99233 SBSQ HOSP IP/OBS HIGH 50: CPT | Performed by: STUDENT IN AN ORGANIZED HEALTH CARE EDUCATION/TRAINING PROGRAM

## 2023-01-01 PROCEDURE — 83605 ASSAY OF LACTIC ACID: CPT

## 2023-01-01 PROCEDURE — 96366 THER/PROPH/DIAG IV INF ADDON: CPT

## 2023-01-01 PROCEDURE — 76705 ECHO EXAM OF ABDOMEN: CPT

## 2023-01-01 PROCEDURE — 81001 URINALYSIS AUTO W/SCOPE: CPT | Performed by: FAMILY MEDICINE

## 2023-01-01 PROCEDURE — 85520 HEPARIN ASSAY: CPT | Performed by: INTERNAL MEDICINE

## 2023-01-01 PROCEDURE — 82010 KETONE BODYS QUAN: CPT | Mod: GEALAB

## 2023-01-01 PROCEDURE — 2500000004 HC RX 250 GENERAL PHARMACY W/ HCPCS (ALT 636 FOR OP/ED): Performed by: NURSE PRACTITIONER

## 2023-01-01 PROCEDURE — 1150000001 HC HOSPICE PRIVATE ROOM DAILY

## 2023-01-01 PROCEDURE — 94760 N-INVAS EAR/PLS OXIMETRY 1: CPT

## 2023-01-01 PROCEDURE — 84132 ASSAY OF SERUM POTASSIUM: CPT | Performed by: INTERNAL MEDICINE

## 2023-01-01 PROCEDURE — 70450 CT HEAD/BRAIN W/O DYE: CPT | Performed by: RADIOLOGY

## 2023-01-01 PROCEDURE — 76770 US EXAM ABDO BACK WALL COMP: CPT

## 2023-01-01 PROCEDURE — 72125 CT NECK SPINE W/O DYE: CPT

## 2023-01-01 PROCEDURE — 2060000001 HC INTERMEDIATE ICU ROOM DAILY

## 2023-01-01 PROCEDURE — 31720 CLEARANCE OF AIRWAYS: CPT

## 2023-01-01 PROCEDURE — 71250 CT THORAX DX C-: CPT | Performed by: STUDENT IN AN ORGANIZED HEALTH CARE EDUCATION/TRAINING PROGRAM

## 2023-01-01 PROCEDURE — 72125 CT NECK SPINE W/O DYE: CPT | Performed by: STUDENT IN AN ORGANIZED HEALTH CARE EDUCATION/TRAINING PROGRAM

## 2023-01-01 PROCEDURE — 83735 ASSAY OF MAGNESIUM: CPT | Performed by: FAMILY MEDICINE

## 2023-01-01 PROCEDURE — 82140 ASSAY OF AMMONIA: CPT | Performed by: FAMILY MEDICINE

## 2023-01-01 PROCEDURE — 96365 THER/PROPH/DIAG IV INF INIT: CPT | Mod: 59 | Performed by: FAMILY MEDICINE

## 2023-01-01 PROCEDURE — 72131 CT LUMBAR SPINE W/O DYE: CPT | Performed by: STUDENT IN AN ORGANIZED HEALTH CARE EDUCATION/TRAINING PROGRAM

## 2023-01-01 PROCEDURE — 99239 HOSP IP/OBS DSCHRG MGMT >30: CPT | Performed by: INTERNAL MEDICINE

## 2023-01-01 PROCEDURE — 71045 X-RAY EXAM CHEST 1 VIEW: CPT | Performed by: RADIOLOGY

## 2023-01-01 PROCEDURE — 70486 CT MAXILLOFACIAL W/O DYE: CPT | Performed by: STUDENT IN AN ORGANIZED HEALTH CARE EDUCATION/TRAINING PROGRAM

## 2023-01-01 PROCEDURE — 87086 URINE CULTURE/COLONY COUNT: CPT | Mod: GEALAB

## 2023-01-01 PROCEDURE — 93010 ELECTROCARDIOGRAM REPORT: CPT | Performed by: INTERNAL MEDICINE

## 2023-01-01 PROCEDURE — 72128 CT CHEST SPINE W/O DYE: CPT | Performed by: STUDENT IN AN ORGANIZED HEALTH CARE EDUCATION/TRAINING PROGRAM

## 2023-01-01 PROCEDURE — 70450 CT HEAD/BRAIN W/O DYE: CPT | Performed by: STUDENT IN AN ORGANIZED HEALTH CARE EDUCATION/TRAINING PROGRAM

## 2023-01-01 PROCEDURE — 82550 ASSAY OF CK (CPK): CPT | Performed by: FAMILY MEDICINE

## 2023-01-01 PROCEDURE — 99233 SBSQ HOSP IP/OBS HIGH 50: CPT | Performed by: INTERNAL MEDICINE

## 2023-01-01 PROCEDURE — 80202 ASSAY OF VANCOMYCIN: CPT | Performed by: INTERNAL MEDICINE

## 2023-01-01 PROCEDURE — 83735 ASSAY OF MAGNESIUM: CPT | Performed by: INTERNAL MEDICINE

## 2023-01-01 PROCEDURE — 96366 THER/PROPH/DIAG IV INF ADDON: CPT | Mod: 59 | Performed by: FAMILY MEDICINE

## 2023-01-01 PROCEDURE — 99232 SBSQ HOSP IP/OBS MODERATE 35: CPT | Performed by: STUDENT IN AN ORGANIZED HEALTH CARE EDUCATION/TRAINING PROGRAM

## 2023-01-01 PROCEDURE — 92526 ORAL FUNCTION THERAPY: CPT | Mod: GN

## 2023-01-01 PROCEDURE — 76775 US EXAM ABDO BACK WALL LIM: CPT | Performed by: STUDENT IN AN ORGANIZED HEALTH CARE EDUCATION/TRAINING PROGRAM

## 2023-01-01 PROCEDURE — 99223 1ST HOSP IP/OBS HIGH 75: CPT | Performed by: NURSE PRACTITIONER

## 2023-01-01 PROCEDURE — 72128 CT CHEST SPINE W/O DYE: CPT

## 2023-01-01 PROCEDURE — 76377 3D RENDER W/INTRP POSTPROCES: CPT | Performed by: STUDENT IN AN ORGANIZED HEALTH CARE EDUCATION/TRAINING PROGRAM

## 2023-01-01 PROCEDURE — 80053 COMPREHEN METABOLIC PANEL: CPT | Mod: MUE | Performed by: FAMILY MEDICINE

## 2023-01-01 PROCEDURE — 76705 ECHO EXAM OF ABDOMEN: CPT | Performed by: RADIOLOGY

## 2023-01-01 PROCEDURE — 2500000001 HC RX 250 WO HCPCS SELF ADMINISTERED DRUGS (ALT 637 FOR MEDICARE OP): Performed by: NURSE PRACTITIONER

## 2023-01-01 PROCEDURE — 93306 TTE W/DOPPLER COMPLETE: CPT

## 2023-01-01 PROCEDURE — 72131 CT LUMBAR SPINE W/O DYE: CPT

## 2023-01-01 PROCEDURE — 82270 OCCULT BLOOD FECES: CPT | Performed by: FAMILY MEDICINE

## 2023-01-01 PROCEDURE — 85027 COMPLETE CBC AUTOMATED: CPT | Performed by: FAMILY MEDICINE

## 2023-01-01 PROCEDURE — 99285 EMERGENCY DEPT VISIT HI MDM: CPT | Mod: 25 | Performed by: FAMILY MEDICINE

## 2023-01-01 PROCEDURE — 86140 C-REACTIVE PROTEIN: CPT

## 2023-01-01 PROCEDURE — 85610 PROTHROMBIN TIME: CPT | Performed by: FAMILY MEDICINE

## 2023-01-01 PROCEDURE — 85520 HEPARIN ASSAY: CPT

## 2023-01-01 PROCEDURE — 92526 ORAL FUNCTION THERAPY: CPT | Mod: GN | Performed by: SPEECH-LANGUAGE PATHOLOGIST

## 2023-01-01 PROCEDURE — 2500000004 HC RX 250 GENERAL PHARMACY W/ HCPCS (ALT 636 FOR OP/ED): Performed by: FAMILY MEDICINE

## 2023-01-01 PROCEDURE — 85730 THROMBOPLASTIN TIME PARTIAL: CPT | Performed by: FAMILY MEDICINE

## 2023-01-01 PROCEDURE — 96376 TX/PRO/DX INJ SAME DRUG ADON: CPT

## 2023-01-01 PROCEDURE — 70486 CT MAXILLOFACIAL W/O DYE: CPT

## 2023-01-01 PROCEDURE — 1100000001 HC PRIVATE ROOM DAILY

## 2023-01-01 RX ORDER — NAPROXEN SODIUM 220 MG/1
81 TABLET, FILM COATED ORAL EVERY 24 HOURS
Status: DISCONTINUED | OUTPATIENT
Start: 2023-01-01 | End: 2023-01-01

## 2023-01-01 RX ORDER — IPRATROPIUM BROMIDE AND ALBUTEROL SULFATE 2.5; .5 MG/3ML; MG/3ML
3 SOLUTION RESPIRATORY (INHALATION) 4 TIMES DAILY PRN
Status: DISCONTINUED | OUTPATIENT
Start: 2023-01-01 | End: 2023-01-01

## 2023-01-01 RX ORDER — INSULIN GLARGINE 100 [IU]/ML
50 INJECTION, SOLUTION SUBCUTANEOUS 2 TIMES DAILY
Status: DISCONTINUED | OUTPATIENT
Start: 2023-01-01 | End: 2023-01-01

## 2023-01-01 RX ORDER — MORPHINE SULFATE 2 MG/ML
2 INJECTION, SOLUTION INTRAMUSCULAR; INTRAVENOUS ONCE
Status: COMPLETED | OUTPATIENT
Start: 2023-01-01 | End: 2023-01-01

## 2023-01-01 RX ORDER — DEXTROSE MONOHYDRATE AND SODIUM CHLORIDE 5; .45 G/100ML; G/100ML
200 INJECTION, SOLUTION INTRAVENOUS CONTINUOUS
Status: DISCONTINUED | OUTPATIENT
Start: 2023-01-01 | End: 2023-01-01

## 2023-01-01 RX ORDER — FLECAINIDE ACETATE 100 MG/1
100 TABLET ORAL 2 TIMES DAILY
COMMUNITY
Start: 2023-01-01 | End: 2023-01-01 | Stop reason: HOSPADM

## 2023-01-01 RX ORDER — NYSTATIN 100000 [USP'U]/ML
5 SUSPENSION ORAL 4 TIMES DAILY
Status: DISCONTINUED | OUTPATIENT
Start: 2023-01-01 | End: 2023-01-01 | Stop reason: HOSPADM

## 2023-01-01 RX ORDER — HEPARIN SODIUM 5000 [USP'U]/ML
4000 INJECTION, SOLUTION INTRAVENOUS; SUBCUTANEOUS ONCE
Status: COMPLETED | OUTPATIENT
Start: 2023-01-01 | End: 2023-01-01

## 2023-01-01 RX ORDER — DEXTROSE MONOHYDRATE 50 MG/ML
150 INJECTION, SOLUTION INTRAVENOUS CONTINUOUS
Status: DISCONTINUED | OUTPATIENT
Start: 2023-01-01 | End: 2023-01-01

## 2023-01-01 RX ORDER — LORAZEPAM 2 MG/ML
0.5 INJECTION INTRAMUSCULAR EVERY 4 HOURS PRN
Status: DISCONTINUED | OUTPATIENT
Start: 2023-01-01 | End: 2023-01-01 | Stop reason: HOSPADM

## 2023-01-01 RX ORDER — CEFAZOLIN SODIUM 1 G/50ML
1 SOLUTION INTRAVENOUS EVERY 8 HOURS
Status: DISCONTINUED | OUTPATIENT
Start: 2023-01-01 | End: 2023-01-01

## 2023-01-01 RX ORDER — HEPARIN SODIUM 5000 [USP'U]/ML
4000 INJECTION, SOLUTION INTRAVENOUS; SUBCUTANEOUS ONCE
Status: DISCONTINUED | OUTPATIENT
Start: 2023-01-01 | End: 2023-01-01

## 2023-01-01 RX ORDER — LISINOPRIL 5 MG/1
5 TABLET ORAL
COMMUNITY
Start: 2023-01-01 | End: 2023-01-01 | Stop reason: HOSPADM

## 2023-01-01 RX ORDER — DEXTROSE MONOHYDRATE 100 MG/ML
150 INJECTION, SOLUTION INTRAVENOUS CONTINUOUS PRN
Status: DISCONTINUED | OUTPATIENT
Start: 2023-01-01 | End: 2023-01-01

## 2023-01-01 RX ORDER — NYSTATIN 100000 [USP'U]/ML
5 SUSPENSION ORAL 4 TIMES DAILY
Status: CANCELLED | OUTPATIENT
Start: 2023-01-01 | End: 2023-11-26

## 2023-01-01 RX ORDER — HALOPERIDOL 5 MG/ML
1 INJECTION INTRAMUSCULAR EVERY 4 HOURS PRN
Status: DISCONTINUED | OUTPATIENT
Start: 2023-01-01 | End: 2023-01-01 | Stop reason: HOSPADM

## 2023-01-01 RX ORDER — HYDROMORPHONE HCL/0.9% NACL/PF 15 MG/30ML
PATIENT CONTROLLED ANALGESIA SYRINGE INTRAVENOUS CONTINUOUS
Status: DISCONTINUED | OUTPATIENT
Start: 2023-01-01 | End: 2023-01-01 | Stop reason: HOSPADM

## 2023-01-01 RX ORDER — DEXTROSE MONOHYDRATE 100 MG/ML
150 INJECTION, SOLUTION INTRAVENOUS CONTINUOUS PRN
Status: DISCONTINUED | OUTPATIENT
Start: 2023-01-01 | End: 2023-01-01 | Stop reason: HOSPADM

## 2023-01-01 RX ORDER — NAPROXEN SODIUM 220 MG/1
81 TABLET, FILM COATED ORAL EVERY 24 HOURS
Status: ON HOLD | COMMUNITY
End: 2023-01-01 | Stop reason: ENTERED-IN-ERROR

## 2023-01-01 RX ORDER — LORAZEPAM 2 MG/ML
2 INJECTION INTRAMUSCULAR EVERY 10 MIN PRN
Status: CANCELLED | OUTPATIENT
Start: 2023-01-01

## 2023-01-01 RX ORDER — SODIUM CHLORIDE 450 MG/100ML
250 INJECTION, SOLUTION INTRAVENOUS CONTINUOUS PRN
Status: DISCONTINUED | OUTPATIENT
Start: 2023-01-01 | End: 2023-01-01

## 2023-01-01 RX ORDER — HEPARIN SODIUM 5000 [USP'U]/ML
80 INJECTION, SOLUTION INTRAVENOUS; SUBCUTANEOUS ONCE
Status: DISCONTINUED | OUTPATIENT
Start: 2023-01-01 | End: 2023-01-01

## 2023-01-01 RX ORDER — GLYCOPYRROLATE 0.2 MG/ML
0.2 INJECTION INTRAMUSCULAR; INTRAVENOUS EVERY 4 HOURS PRN
Status: DISCONTINUED | OUTPATIENT
Start: 2023-01-01 | End: 2023-01-01 | Stop reason: HOSPADM

## 2023-01-01 RX ORDER — ACETAMINOPHEN 650 MG/1
650 SUPPOSITORY RECTAL EVERY 4 HOURS PRN
Status: DISCONTINUED | OUTPATIENT
Start: 2023-01-01 | End: 2023-01-01 | Stop reason: HOSPADM

## 2023-01-01 RX ORDER — IPRATROPIUM BROMIDE AND ALBUTEROL SULFATE 2.5; .5 MG/3ML; MG/3ML
3 SOLUTION RESPIRATORY (INHALATION) EVERY 2 HOUR PRN
Status: DISCONTINUED | OUTPATIENT
Start: 2023-01-01 | End: 2023-01-01 | Stop reason: HOSPADM

## 2023-01-01 RX ORDER — POTASSIUM CHLORIDE 14.9 MG/ML
20 INJECTION INTRAVENOUS
Status: COMPLETED | OUTPATIENT
Start: 2023-01-01 | End: 2023-01-01

## 2023-01-01 RX ORDER — DEXTROSE MONOHYDRATE AND SODIUM CHLORIDE 5; .9 G/100ML; G/100ML
150 INJECTION, SOLUTION INTRAVENOUS CONTINUOUS
Status: DISCONTINUED | OUTPATIENT
Start: 2023-01-01 | End: 2023-01-01 | Stop reason: HOSPADM

## 2023-01-01 RX ORDER — DEXTROSE MONOHYDRATE 50 MG/ML
50 INJECTION, SOLUTION INTRAVENOUS CONTINUOUS
Status: DISCONTINUED | OUTPATIENT
Start: 2023-01-01 | End: 2023-01-01

## 2023-01-01 RX ORDER — HEPARIN SODIUM 10000 [USP'U]/100ML
INJECTION, SOLUTION INTRAVENOUS
Status: COMPLETED
Start: 2023-01-01 | End: 2023-01-01

## 2023-01-01 RX ORDER — ATORVASTATIN CALCIUM 40 MG/1
40 TABLET, FILM COATED ORAL DAILY
COMMUNITY
Start: 2023-01-01 | End: 2023-01-01 | Stop reason: HOSPADM

## 2023-01-01 RX ORDER — POTASSIUM CHLORIDE 14.9 MG/ML
20 INJECTION INTRAVENOUS ONCE
Status: COMPLETED | OUTPATIENT
Start: 2023-01-01 | End: 2023-01-01

## 2023-01-01 RX ORDER — HEPARIN SODIUM 10000 [USP'U]/100ML
0-4000 INJECTION, SOLUTION INTRAVENOUS CONTINUOUS
Status: DISCONTINUED | OUTPATIENT
Start: 2023-01-01 | End: 2023-01-01

## 2023-01-01 RX ORDER — ATORVASTATIN CALCIUM 40 MG/1
40 TABLET, FILM COATED ORAL DAILY
Status: DISCONTINUED | OUTPATIENT
Start: 2023-01-01 | End: 2023-01-01

## 2023-01-01 RX ORDER — HALOPERIDOL 5 MG/ML
1 INJECTION INTRAMUSCULAR EVERY 4 HOURS PRN
Status: CANCELLED | OUTPATIENT
Start: 2023-01-01

## 2023-01-01 RX ORDER — METOPROLOL TARTRATE 25 MG/1
25 TABLET, FILM COATED ORAL 2 TIMES DAILY
Status: DISCONTINUED | OUTPATIENT
Start: 2023-01-01 | End: 2023-01-01

## 2023-01-01 RX ORDER — LORAZEPAM 2 MG/ML
2 INJECTION INTRAMUSCULAR EVERY 10 MIN PRN
Status: DISCONTINUED | OUTPATIENT
Start: 2023-01-01 | End: 2023-01-01 | Stop reason: HOSPADM

## 2023-01-01 RX ORDER — METOPROLOL TARTRATE 1 MG/ML
5 INJECTION, SOLUTION INTRAVENOUS EVERY 6 HOURS
Status: DISCONTINUED | OUTPATIENT
Start: 2023-01-01 | End: 2023-01-01

## 2023-01-01 RX ORDER — POTASSIUM CHLORIDE 14.9 MG/ML
INJECTION INTRAVENOUS
Status: COMPLETED
Start: 2023-01-01 | End: 2023-01-01

## 2023-01-01 RX ORDER — HEPARIN SODIUM 5000 [USP'U]/ML
3000-6000 INJECTION, SOLUTION INTRAVENOUS; SUBCUTANEOUS EVERY 4 HOURS PRN
Status: DISCONTINUED | OUTPATIENT
Start: 2023-01-01 | End: 2023-01-01

## 2023-01-01 RX ORDER — AMMONIUM LACTATE 12 G/100G
396 LOTION TOPICAL AS NEEDED
COMMUNITY
End: 2023-01-01 | Stop reason: HOSPADM

## 2023-01-01 RX ORDER — HEPARIN SODIUM 10000 [USP'U]/100ML
0-4000 INJECTION, SOLUTION INTRAVENOUS CONTINUOUS
Status: DISCONTINUED | OUTPATIENT
Start: 2023-01-01 | End: 2023-01-01 | Stop reason: HOSPADM

## 2023-01-01 RX ORDER — DEXTROSE 50 % IN WATER (D50W) INTRAVENOUS SYRINGE
50 AS NEEDED
Status: DISCONTINUED | OUTPATIENT
Start: 2023-01-01 | End: 2023-01-01 | Stop reason: HOSPADM

## 2023-01-01 RX ORDER — MORPHINE SULFATE 4 MG/ML
4 INJECTION INTRAVENOUS
Status: DISCONTINUED | OUTPATIENT
Start: 2023-01-01 | End: 2023-01-01

## 2023-01-01 RX ORDER — HYDROMORPHONE HCL/0.9% NACL/PF 15 MG/30ML
PATIENT CONTROLLED ANALGESIA SYRINGE INTRAVENOUS CONTINUOUS
Status: CANCELLED | OUTPATIENT
Start: 2023-01-01

## 2023-01-01 RX ORDER — HEPARIN SODIUM 10000 [USP'U]/100ML
0-4500 INJECTION, SOLUTION INTRAVENOUS CONTINUOUS
Status: DISCONTINUED | OUTPATIENT
Start: 2023-01-01 | End: 2023-01-01

## 2023-01-01 RX ORDER — LORAZEPAM 2 MG/ML
0.5 INJECTION INTRAMUSCULAR EVERY 4 HOURS PRN
Status: CANCELLED | OUTPATIENT
Start: 2023-01-01

## 2023-01-01 RX ORDER — HEPARIN SODIUM 5000 [USP'U]/ML
3000-4000 INJECTION, SOLUTION INTRAVENOUS; SUBCUTANEOUS EVERY 4 HOURS PRN
Status: DISCONTINUED | OUTPATIENT
Start: 2023-01-01 | End: 2023-01-01

## 2023-01-01 RX ORDER — INSULIN DETEMIR 100 [IU]/ML
INJECTION, SOLUTION SUBCUTANEOUS
Status: ON HOLD | COMMUNITY
Start: 2014-12-15 | End: 2023-01-01 | Stop reason: ENTERED-IN-ERROR

## 2023-01-01 RX ORDER — HEPARIN SODIUM 5000 [USP'U]/ML
2000-4000 INJECTION, SOLUTION INTRAVENOUS; SUBCUTANEOUS EVERY 4 HOURS PRN
Status: DISCONTINUED | OUTPATIENT
Start: 2023-01-01 | End: 2023-01-01

## 2023-01-01 RX ORDER — POTASSIUM CHLORIDE 20 MEQ/1
20 TABLET, EXTENDED RELEASE ORAL DAILY
COMMUNITY
End: 2023-01-01 | Stop reason: HOSPADM

## 2023-01-01 RX ORDER — SODIUM CHLORIDE, SODIUM LACTATE, POTASSIUM CHLORIDE, CALCIUM CHLORIDE 600; 310; 30; 20 MG/100ML; MG/100ML; MG/100ML; MG/100ML
1000 INJECTION, SOLUTION INTRAVENOUS ONCE
Status: COMPLETED | OUTPATIENT
Start: 2023-01-01 | End: 2023-01-01

## 2023-01-01 RX ORDER — GLYCOPYRROLATE 0.2 MG/ML
0.2 INJECTION INTRAMUSCULAR; INTRAVENOUS ONCE
Status: COMPLETED | OUTPATIENT
Start: 2023-01-01 | End: 2023-01-01

## 2023-01-01 RX ORDER — METOPROLOL TARTRATE 25 MG/1
25 TABLET, FILM COATED ORAL 2 TIMES DAILY
COMMUNITY
Start: 2023-01-01 | End: 2023-01-01 | Stop reason: HOSPADM

## 2023-01-01 RX ORDER — INSULIN GLARGINE 100 [IU]/ML
40 INJECTION, SOLUTION SUBCUTANEOUS 2 TIMES DAILY
Status: DISCONTINUED | OUTPATIENT
Start: 2023-01-01 | End: 2023-01-01

## 2023-01-01 RX ORDER — INSULIN LISPRO 100 [IU]/ML
5 INJECTION, SOLUTION INTRAVENOUS; SUBCUTANEOUS
Status: DISCONTINUED | OUTPATIENT
Start: 2023-01-01 | End: 2023-01-01

## 2023-01-01 RX ORDER — INSULIN LISPRO 100 [IU]/ML
0-10 INJECTION, SOLUTION INTRAVENOUS; SUBCUTANEOUS
Status: DISCONTINUED | OUTPATIENT
Start: 2023-01-01 | End: 2023-01-01

## 2023-01-01 RX ORDER — ACETAMINOPHEN 650 MG/1
650 SUPPOSITORY RECTAL EVERY 6 HOURS PRN
Status: DISCONTINUED | OUTPATIENT
Start: 2023-01-01 | End: 2023-01-01

## 2023-01-01 RX ORDER — SODIUM CHLORIDE 450 MG/100ML
150 INJECTION, SOLUTION INTRAVENOUS CONTINUOUS
Status: ACTIVE | OUTPATIENT
Start: 2023-01-01 | End: 2023-01-01

## 2023-01-01 RX ORDER — ACETAMINOPHEN 325 MG/1
650 TABLET ORAL EVERY 6 HOURS PRN
Status: DISCONTINUED | OUTPATIENT
Start: 2023-01-01 | End: 2023-01-01 | Stop reason: HOSPADM

## 2023-01-01 RX ORDER — GUAIFENESIN 600 MG/1
1200 TABLET, EXTENDED RELEASE ORAL 2 TIMES DAILY
Status: DISCONTINUED | OUTPATIENT
Start: 2023-01-01 | End: 2023-01-01

## 2023-01-01 RX ORDER — GLYCOPYRROLATE 0.2 MG/ML
0.2 INJECTION INTRAMUSCULAR; INTRAVENOUS EVERY 4 HOURS PRN
Status: CANCELLED | OUTPATIENT
Start: 2023-01-01

## 2023-01-01 RX ORDER — INSULIN LISPRO 100 [IU]/ML
10 INJECTION, SOLUTION INTRAVENOUS; SUBCUTANEOUS
Status: DISCONTINUED | OUTPATIENT
Start: 2023-01-01 | End: 2023-01-01

## 2023-01-01 RX ORDER — DEXTROSE MONOHYDRATE 100 MG/ML
0.3 INJECTION, SOLUTION INTRAVENOUS ONCE AS NEEDED
Status: COMPLETED | OUTPATIENT
Start: 2023-01-01 | End: 2023-01-01

## 2023-01-01 RX ORDER — METOPROLOL TARTRATE 1 MG/ML
5 INJECTION, SOLUTION INTRAVENOUS
Status: DISCONTINUED | OUTPATIENT
Start: 2023-01-01 | End: 2023-01-01

## 2023-01-01 RX ORDER — CALCIUM CARBONATE 300MG(750)
400 TABLET,CHEWABLE ORAL DAILY
COMMUNITY
End: 2023-01-01 | Stop reason: HOSPADM

## 2023-01-01 RX ORDER — ACETAMINOPHEN 160 MG/5ML
650 SOLUTION ORAL EVERY 4 HOURS PRN
Status: DISCONTINUED | OUTPATIENT
Start: 2023-01-01 | End: 2023-01-01

## 2023-01-01 RX ORDER — INSULIN LISPRO 100 [IU]/ML
0-20 INJECTION, SOLUTION INTRAVENOUS; SUBCUTANEOUS
Status: DISCONTINUED | OUTPATIENT
Start: 2023-01-01 | End: 2023-01-01

## 2023-01-01 RX ORDER — INSULIN GLARGINE 100 [IU]/ML
50 INJECTION, SOLUTION SUBCUTANEOUS 2 TIMES DAILY
COMMUNITY
Start: 2023-01-01 | End: 2023-01-01 | Stop reason: HOSPADM

## 2023-01-01 RX ORDER — MORPHINE SULFATE 2 MG/ML
2 INJECTION, SOLUTION INTRAMUSCULAR; INTRAVENOUS
Status: DISCONTINUED | OUTPATIENT
Start: 2023-01-01 | End: 2023-01-01

## 2023-01-01 RX ORDER — BISACODYL 10 MG/1
10 SUPPOSITORY RECTAL ONCE
Status: DISCONTINUED | OUTPATIENT
Start: 2023-01-01 | End: 2023-01-01

## 2023-01-01 RX ORDER — VANCOMYCIN HYDROCHLORIDE 1 G/200ML
1000 INJECTION, SOLUTION INTRAVENOUS EVERY 12 HOURS
Status: DISCONTINUED | OUTPATIENT
Start: 2023-01-01 | End: 2023-01-01

## 2023-01-01 RX ORDER — MORPHINE SULFATE 2 MG/ML
2 INJECTION, SOLUTION INTRAMUSCULAR; INTRAVENOUS
Status: CANCELLED | OUTPATIENT
Start: 2023-01-01

## 2023-01-01 RX ORDER — MORPHINE SULFATE 4 MG/ML
4 INJECTION INTRAVENOUS
Status: CANCELLED | OUTPATIENT
Start: 2023-01-01

## 2023-01-01 RX ORDER — DEXTROSE 50 % IN WATER (D50W) INTRAVENOUS SYRINGE
25
Status: DISCONTINUED | OUTPATIENT
Start: 2023-01-01 | End: 2023-01-01 | Stop reason: HOSPADM

## 2023-01-01 RX ORDER — HEPARIN SODIUM 5000 [USP'U]/ML
INJECTION, SOLUTION INTRAVENOUS; SUBCUTANEOUS EVERY 4 HOURS PRN
Status: DISCONTINUED | OUTPATIENT
Start: 2023-01-01 | End: 2023-01-01 | Stop reason: HOSPADM

## 2023-01-01 RX ORDER — FUROSEMIDE 80 MG/1
80 TABLET ORAL
COMMUNITY
Start: 2023-01-01 | End: 2023-01-01 | Stop reason: HOSPADM

## 2023-01-01 RX ORDER — BACITRACIN ZINC 500 UNIT/G
OINTMENT IN PACKET (EA) TOPICAL 3 TIMES DAILY
Status: DISCONTINUED | OUTPATIENT
Start: 2023-01-01 | End: 2023-01-01 | Stop reason: HOSPADM

## 2023-01-01 RX ORDER — METOPROLOL TARTRATE 1 MG/ML
5 INJECTION, SOLUTION INTRAVENOUS ONCE
Status: COMPLETED | OUTPATIENT
Start: 2023-01-01 | End: 2023-01-01

## 2023-01-01 RX ORDER — FLECAINIDE ACETATE 50 MG/1
100 TABLET ORAL 2 TIMES DAILY
Status: DISCONTINUED | OUTPATIENT
Start: 2023-01-01 | End: 2023-01-01

## 2023-01-01 RX ORDER — DEXTROSE 50 % IN WATER (D50W) INTRAVENOUS SYRINGE
50
Status: DISCONTINUED | OUTPATIENT
Start: 2023-01-01 | End: 2023-01-01

## 2023-01-01 RX ORDER — DEXTROSE MONOHYDRATE AND SODIUM CHLORIDE 5; .45 G/100ML; G/100ML
150 INJECTION, SOLUTION INTRAVENOUS CONTINUOUS PRN
Status: DISCONTINUED | OUTPATIENT
Start: 2023-01-01 | End: 2023-01-01

## 2023-01-01 RX ADMIN — NYSTATIN 500000 UNITS: 100000 SUSPENSION ORAL at 17:26

## 2023-01-01 RX ADMIN — INSULIN HUMAN 5 UNITS/HR: 1 INJECTION, SOLUTION INTRAVENOUS at 16:46

## 2023-01-01 RX ADMIN — INSULIN GLARGINE 50 UNITS: 100 INJECTION, SOLUTION SUBCUTANEOUS at 22:29

## 2023-01-01 RX ADMIN — LORAZEPAM 2 MG: 2 INJECTION, SOLUTION INTRAMUSCULAR; INTRAVENOUS at 16:22

## 2023-01-01 RX ADMIN — NYSTATIN 500000 UNITS: 100000 SUSPENSION ORAL at 20:52

## 2023-01-01 RX ADMIN — INSULIN LISPRO 12 UNITS: 100 INJECTION, SOLUTION INTRAVENOUS; SUBCUTANEOUS at 13:00

## 2023-01-01 RX ADMIN — INSULIN LISPRO 10 UNITS: 100 INJECTION, SOLUTION INTRAVENOUS; SUBCUTANEOUS at 13:01

## 2023-01-01 RX ADMIN — SODIUM CHLORIDE 1000 ML: 9 INJECTION, SOLUTION INTRAVENOUS at 02:02

## 2023-01-01 RX ADMIN — ATORVASTATIN CALCIUM 40 MG: 40 TABLET, FILM COATED ORAL at 20:05

## 2023-01-01 RX ADMIN — Medication 2 L/MIN: at 08:55

## 2023-01-01 RX ADMIN — HEPARIN SODIUM 2000 UNITS/HR: 10000 INJECTION, SOLUTION INTRAVENOUS at 13:15

## 2023-01-01 RX ADMIN — DEXTROSE MONOHYDRATE 150 ML/HR: 50 INJECTION, SOLUTION INTRAVENOUS at 22:35

## 2023-01-01 RX ADMIN — VANCOMYCIN HYDROCHLORIDE 2000 MG: 5 INJECTION, POWDER, LYOPHILIZED, FOR SOLUTION INTRAVENOUS at 14:35

## 2023-01-01 RX ADMIN — GLYCOPYRROLATE 0.2 MG: 0.2 INJECTION, SOLUTION INTRAMUSCULAR; INTRAVENOUS at 08:33

## 2023-01-01 RX ADMIN — INSULIN GLARGINE 50 UNITS: 100 INJECTION, SOLUTION SUBCUTANEOUS at 21:44

## 2023-01-01 RX ADMIN — APIXABAN 5 MG: 5 TABLET, FILM COATED ORAL at 11:52

## 2023-01-01 RX ADMIN — CEFAZOLIN SODIUM 1 G: 1 INJECTION, SOLUTION INTRAVENOUS at 14:58

## 2023-01-01 RX ADMIN — SODIUM CHLORIDE, POTASSIUM CHLORIDE, SODIUM LACTATE AND CALCIUM CHLORIDE 1000 ML: 600; 310; 30; 20 INJECTION, SOLUTION INTRAVENOUS at 08:19

## 2023-01-01 RX ADMIN — NYSTATIN 500000 UNITS: 100000 SUSPENSION ORAL at 21:53

## 2023-01-01 RX ADMIN — INSULIN HUMAN 5 UNITS: 1 INJECTION, SOLUTION INTRAVENOUS at 06:02

## 2023-01-01 RX ADMIN — INSULIN HUMAN 5 UNITS/HR: 1 INJECTION, SOLUTION INTRAVENOUS at 15:31

## 2023-01-01 RX ADMIN — INSULIN GLARGINE 50 UNITS: 100 INJECTION, SOLUTION SUBCUTANEOUS at 21:53

## 2023-01-01 RX ADMIN — DEXTROSE AND SODIUM CHLORIDE 200 ML/HR: 5; 450 INJECTION, SOLUTION INTRAVENOUS at 03:15

## 2023-01-01 RX ADMIN — PIPERACILLIN SODIUM AND TAZOBACTAM SODIUM 4.5 G: 4; .5 INJECTION, SOLUTION INTRAVENOUS at 21:52

## 2023-01-01 RX ADMIN — MORPHINE SULFATE 4 MG: 4 INJECTION INTRAVENOUS at 14:26

## 2023-01-01 RX ADMIN — APIXABAN 5 MG: 5 TABLET, FILM COATED ORAL at 21:40

## 2023-01-01 RX ADMIN — MORPHINE SULFATE 4 MG: 4 INJECTION INTRAVENOUS at 12:58

## 2023-01-01 RX ADMIN — APIXABAN 5 MG: 5 TABLET, FILM COATED ORAL at 20:05

## 2023-01-01 RX ADMIN — ATORVASTATIN CALCIUM 40 MG: 40 TABLET, FILM COATED ORAL at 20:58

## 2023-01-01 RX ADMIN — APIXABAN 5 MG: 5 TABLET, FILM COATED ORAL at 08:19

## 2023-01-01 RX ADMIN — ATORVASTATIN CALCIUM 40 MG: 40 TABLET, FILM COATED ORAL at 21:40

## 2023-01-01 RX ADMIN — BACITRACIN 1 APPLICATION: 500 OINTMENT TOPICAL at 08:45

## 2023-01-01 RX ADMIN — MORPHINE SULFATE 4 MG: 4 INJECTION INTRAVENOUS at 09:49

## 2023-01-01 RX ADMIN — MORPHINE SULFATE 2 MG: 2 INJECTION, SOLUTION INTRAMUSCULAR; INTRAVENOUS at 23:02

## 2023-01-01 RX ADMIN — GLYCOPYRROLATE 0.2 MG: 0.2 INJECTION, SOLUTION INTRAMUSCULAR; INTRAVENOUS at 20:15

## 2023-01-01 RX ADMIN — CEFAZOLIN SODIUM 1 G: 1 INJECTION, SOLUTION INTRAVENOUS at 12:02

## 2023-01-01 RX ADMIN — NYSTATIN 500000 UNITS: 100000 SUSPENSION ORAL at 18:17

## 2023-01-01 RX ADMIN — CEFAZOLIN SODIUM 1 G: 1 INJECTION, SOLUTION INTRAVENOUS at 04:27

## 2023-01-01 RX ADMIN — BACITRACIN 1 APPLICATION: 500 OINTMENT TOPICAL at 14:56

## 2023-01-01 RX ADMIN — MORPHINE SULFATE 4 MG: 4 INJECTION INTRAVENOUS at 09:29

## 2023-01-01 RX ADMIN — NYSTATIN 500000 UNITS: 100000 SUSPENSION ORAL at 14:54

## 2023-01-01 RX ADMIN — DEXTROSE AND SODIUM CHLORIDE 125 ML/HR: 5; 450 INJECTION, SOLUTION INTRAVENOUS at 13:50

## 2023-01-01 RX ADMIN — INSULIN GLARGINE 50 UNITS: 100 INJECTION, SOLUTION SUBCUTANEOUS at 21:31

## 2023-01-01 RX ADMIN — DEXTROSE MONOHYDRATE 100 ML/HR: 50 INJECTION, SOLUTION INTRAVENOUS at 14:01

## 2023-01-01 RX ADMIN — INSULIN HUMAN 2 UNITS/HR: 1 INJECTION, SOLUTION INTRAVENOUS at 02:07

## 2023-01-01 RX ADMIN — DEXTROSE AND SODIUM CHLORIDE 200 ML/HR: 5; 450 INJECTION, SOLUTION INTRAVENOUS at 21:56

## 2023-01-01 RX ADMIN — PIPERACILLIN SODIUM AND TAZOBACTAM SODIUM 4.5 G: 4; .5 INJECTION, SOLUTION INTRAVENOUS at 09:25

## 2023-01-01 RX ADMIN — METOPROLOL TARTRATE 25 MG: 25 TABLET, FILM COATED ORAL at 08:19

## 2023-01-01 RX ADMIN — ASPIRIN 81 MG CHEWABLE TABLET 81 MG: 81 TABLET CHEWABLE at 06:03

## 2023-01-01 RX ADMIN — ACETAMINOPHEN 650 MG: 650 SUPPOSITORY RECTAL at 21:51

## 2023-01-01 RX ADMIN — INSULIN LISPRO 10 UNITS: 100 INJECTION, SOLUTION INTRAVENOUS; SUBCUTANEOUS at 12:00

## 2023-01-01 RX ADMIN — METOROPROLOL TARTRATE 5 MG: 5 INJECTION, SOLUTION INTRAVENOUS at 03:08

## 2023-01-01 RX ADMIN — INSULIN LISPRO 10 UNITS: 100 INJECTION, SOLUTION INTRAVENOUS; SUBCUTANEOUS at 08:47

## 2023-01-01 RX ADMIN — METOROPROLOL TARTRATE 5 MG: 5 INJECTION, SOLUTION INTRAVENOUS at 03:54

## 2023-01-01 RX ADMIN — CEFAZOLIN SODIUM 1 G: 1 INJECTION, SOLUTION INTRAVENOUS at 12:09

## 2023-01-01 RX ADMIN — METOPROLOL TARTRATE 25 MG: 25 TABLET, FILM COATED ORAL at 08:58

## 2023-01-01 RX ADMIN — CEFAZOLIN SODIUM 1 G: 1 INJECTION, SOLUTION INTRAVENOUS at 13:02

## 2023-01-01 RX ADMIN — INSULIN GLARGINE 50 UNITS: 100 INJECTION, SOLUTION SUBCUTANEOUS at 08:19

## 2023-01-01 RX ADMIN — BACITRACIN 1 APPLICATION: 500 OINTMENT TOPICAL at 08:58

## 2023-01-01 RX ADMIN — GLYCOPYRROLATE 0.2 MG: 0.2 INJECTION, SOLUTION INTRAMUSCULAR; INTRAVENOUS at 04:31

## 2023-01-01 RX ADMIN — INSULIN LISPRO 4 UNITS: 100 INJECTION, SOLUTION INTRAVENOUS; SUBCUTANEOUS at 17:10

## 2023-01-01 RX ADMIN — METOPROLOL TARTRATE 5 MG: 5 INJECTION INTRAVENOUS at 04:15

## 2023-01-01 RX ADMIN — POTASSIUM CHLORIDE 20 MEQ: 14.9 INJECTION, SOLUTION INTRAVENOUS at 08:20

## 2023-01-01 RX ADMIN — INSULIN GLARGINE 50 UNITS: 100 INJECTION, SOLUTION SUBCUTANEOUS at 08:46

## 2023-01-01 RX ADMIN — SODIUM CHLORIDE, POTASSIUM CHLORIDE, SODIUM LACTATE AND CALCIUM CHLORIDE 1000 ML/HR: 600; 310; 30; 20 INJECTION, SOLUTION INTRAVENOUS at 12:30

## 2023-01-01 RX ADMIN — METOPROLOL TARTRATE 25 MG: 25 TABLET, FILM COATED ORAL at 08:21

## 2023-01-01 RX ADMIN — HEPARIN SODIUM AND DEXTROSE 1000 UNITS/HR: 10000; 5 INJECTION INTRAVENOUS at 04:11

## 2023-01-01 RX ADMIN — FLECAINIDE ACETATE 100 MG: 50 TABLET ORAL at 08:45

## 2023-01-01 RX ADMIN — SODIUM CHLORIDE 1000 ML: 9 INJECTION, SOLUTION INTRAVENOUS at 04:10

## 2023-01-01 RX ADMIN — ACETAMINOPHEN 650 MG: 650 SUPPOSITORY RECTAL at 15:27

## 2023-01-01 RX ADMIN — DEXTROSE AND SODIUM CHLORIDE 200 ML/HR: 5; 450 INJECTION, SOLUTION INTRAVENOUS at 01:38

## 2023-01-01 RX ADMIN — METOPROLOL TARTRATE 5 MG: 5 INJECTION INTRAVENOUS at 18:05

## 2023-01-01 RX ADMIN — METOPROLOL TARTRATE 5 MG: 5 INJECTION INTRAVENOUS at 16:05

## 2023-01-01 RX ADMIN — HEPARIN SODIUM 1000 UNITS: 10000 INJECTION, SOLUTION INTRAVENOUS at 06:03

## 2023-01-01 RX ADMIN — BACITRACIN 1 APPLICATION: 500 OINTMENT TOPICAL at 20:59

## 2023-01-01 RX ADMIN — NYSTATIN 500000 UNITS: 100000 SUSPENSION ORAL at 17:21

## 2023-01-01 RX ADMIN — SODIUM CHLORIDE, POTASSIUM CHLORIDE, SODIUM LACTATE AND CALCIUM CHLORIDE 1000 ML/HR: 600; 310; 30; 20 INJECTION, SOLUTION INTRAVENOUS at 18:29

## 2023-01-01 RX ADMIN — BACITRACIN 1 APPLICATION: 500 OINTMENT TOPICAL at 20:52

## 2023-01-01 RX ADMIN — INSULIN LISPRO 4 UNITS: 100 INJECTION, SOLUTION INTRAVENOUS; SUBCUTANEOUS at 12:01

## 2023-01-01 RX ADMIN — GUAIFENESIN 1200 MG: 600 TABLET ORAL at 14:09

## 2023-01-01 RX ADMIN — CEFAZOLIN SODIUM 1 G: 1 INJECTION, SOLUTION INTRAVENOUS at 03:55

## 2023-01-01 RX ADMIN — MORPHINE SULFATE 4 MG: 4 INJECTION INTRAVENOUS at 04:16

## 2023-01-01 RX ADMIN — INSULIN HUMAN 20 UNITS/HR: 1 INJECTION, SOLUTION INTRAVENOUS at 19:45

## 2023-01-01 RX ADMIN — HEPARIN SODIUM 4000 UNITS: 5000 INJECTION INTRAVENOUS; SUBCUTANEOUS at 04:09

## 2023-01-01 RX ADMIN — METOPROLOL TARTRATE 5 MG: 5 INJECTION INTRAVENOUS at 09:26

## 2023-01-01 RX ADMIN — POTASSIUM CHLORIDE 20 MEQ: 14.9 INJECTION INTRAVENOUS at 08:20

## 2023-01-01 RX ADMIN — SODIUM CHLORIDE 150 ML/HR: 4.5 INJECTION, SOLUTION INTRAVENOUS at 07:45

## 2023-01-01 RX ADMIN — APIXABAN 5 MG: 5 TABLET, FILM COATED ORAL at 08:59

## 2023-01-01 RX ADMIN — METOPROLOL TARTRATE 5 MG: 5 INJECTION INTRAVENOUS at 21:52

## 2023-01-01 RX ADMIN — BACITRACIN 1 APPLICATION: 500 OINTMENT TOPICAL at 21:40

## 2023-01-01 RX ADMIN — NYSTATIN 500000 UNITS: 100000 SUSPENSION ORAL at 20:06

## 2023-01-01 RX ADMIN — DEXTROSE MONOHYDRATE 0.3 G/KG/HR: 100 INJECTION, SOLUTION INTRAVENOUS at 04:05

## 2023-01-01 RX ADMIN — BACITRACIN 1 APPLICATION: 500 OINTMENT TOPICAL at 08:19

## 2023-01-01 RX ADMIN — NYSTATIN 500000 UNITS: 100000 SUSPENSION ORAL at 09:26

## 2023-01-01 RX ADMIN — Medication 2 L/MIN: at 08:43

## 2023-01-01 RX ADMIN — POTASSIUM CHLORIDE 20 MEQ: 14.9 INJECTION, SOLUTION INTRAVENOUS at 15:31

## 2023-01-01 RX ADMIN — FLECAINIDE ACETATE 100 MG: 50 TABLET ORAL at 08:19

## 2023-01-01 RX ADMIN — CEFAZOLIN SODIUM 1 G: 1 INJECTION, SOLUTION INTRAVENOUS at 20:15

## 2023-01-01 RX ADMIN — NYSTATIN 500000 UNITS: 100000 SUSPENSION ORAL at 12:03

## 2023-01-01 RX ADMIN — APIXABAN 5 MG: 5 TABLET, FILM COATED ORAL at 08:21

## 2023-01-01 RX ADMIN — BACITRACIN 1 APPLICATION: 500 OINTMENT TOPICAL at 09:00

## 2023-01-01 RX ADMIN — MORPHINE SULFATE 2 MG: 2 INJECTION, SOLUTION INTRAMUSCULAR; INTRAVENOUS at 22:36

## 2023-01-01 RX ADMIN — METOPROLOL TARTRATE 25 MG: 25 TABLET, FILM COATED ORAL at 08:46

## 2023-01-01 RX ADMIN — NYSTATIN 500000 UNITS: 100000 SUSPENSION ORAL at 06:34

## 2023-01-01 RX ADMIN — POTASSIUM PHOSPHATE, MONOBASIC AND POTASSIUM PHOSPHATE, DIBASIC 15 MMOL: 224; 236 INJECTION, SOLUTION, CONCENTRATE INTRAVENOUS at 10:48

## 2023-01-01 RX ADMIN — NYSTATIN 500000 UNITS: 100000 SUSPENSION ORAL at 06:50

## 2023-01-01 RX ADMIN — HYDROMORPHONE HYDROCHLORIDE: 10 INJECTION, SOLUTION INTRAMUSCULAR; INTRAVENOUS; SUBCUTANEOUS at 16:01

## 2023-01-01 RX ADMIN — SODIUM CHLORIDE, POTASSIUM CHLORIDE, SODIUM LACTATE AND CALCIUM CHLORIDE 1000 ML/HR: 600; 310; 30; 20 INJECTION, SOLUTION INTRAVENOUS at 11:52

## 2023-01-01 RX ADMIN — DEXTROSE AND SODIUM CHLORIDE 200 ML/HR: 5; 450 INJECTION, SOLUTION INTRAVENOUS at 19:50

## 2023-01-01 RX ADMIN — NYSTATIN 500000 UNITS: 100000 SUSPENSION ORAL at 17:10

## 2023-01-01 RX ADMIN — INSULIN GLARGINE 50 UNITS: 100 INJECTION, SOLUTION SUBCUTANEOUS at 08:23

## 2023-01-01 RX ADMIN — Medication 2 L/MIN: at 04:33

## 2023-01-01 RX ADMIN — CEFAZOLIN SODIUM 1 G: 1 INJECTION, SOLUTION INTRAVENOUS at 20:51

## 2023-01-01 RX ADMIN — MORPHINE SULFATE 4 MG: 4 INJECTION INTRAVENOUS at 01:00

## 2023-01-01 RX ADMIN — APIXABAN 5 MG: 5 TABLET, FILM COATED ORAL at 20:59

## 2023-01-01 RX ADMIN — HEPARIN SODIUM 2000 UNITS/HR: 10000 INJECTION, SOLUTION INTRAVENOUS at 01:21

## 2023-01-01 RX ADMIN — INSULIN LISPRO 10 UNITS: 100 INJECTION, SOLUTION INTRAVENOUS; SUBCUTANEOUS at 12:18

## 2023-01-01 RX ADMIN — FLECAINIDE ACETATE 100 MG: 50 TABLET ORAL at 21:40

## 2023-01-01 RX ADMIN — FLECAINIDE ACETATE 100 MG: 50 TABLET ORAL at 21:43

## 2023-01-01 RX ADMIN — CEFAZOLIN SODIUM 1 G: 1 INJECTION, SOLUTION INTRAVENOUS at 04:14

## 2023-01-01 RX ADMIN — POTASSIUM PHOSPHATE, MONOBASIC AND POTASSIUM PHOSPHATE, DIBASIC 21 MMOL: 224; 236 INJECTION, SOLUTION, CONCENTRATE INTRAVENOUS at 09:49

## 2023-01-01 RX ADMIN — GLYCOPYRROLATE 0.2 MG: 0.2 INJECTION, SOLUTION INTRAMUSCULAR; INTRAVENOUS at 15:21

## 2023-01-01 RX ADMIN — INSULIN LISPRO 10 UNITS: 100 INJECTION, SOLUTION INTRAVENOUS; SUBCUTANEOUS at 17:11

## 2023-01-01 RX ADMIN — METOPROLOL TARTRATE 25 MG: 25 TABLET, FILM COATED ORAL at 21:40

## 2023-01-01 RX ADMIN — CEFAZOLIN SODIUM 1 G: 1 INJECTION, SOLUTION INTRAVENOUS at 14:08

## 2023-01-01 RX ADMIN — MORPHINE SULFATE 4 MG: 4 INJECTION INTRAVENOUS at 13:47

## 2023-01-01 RX ADMIN — MORPHINE SULFATE 4 MG: 4 INJECTION INTRAVENOUS at 10:35

## 2023-01-01 RX ADMIN — BACITRACIN 2 APPLICATION: 500 OINTMENT TOPICAL at 14:54

## 2023-01-01 RX ADMIN — NYSTATIN 500000 UNITS: 100000 SUSPENSION ORAL at 08:19

## 2023-01-01 RX ADMIN — GLYCOPYRROLATE 0.2 MG: 0.2 INJECTION, SOLUTION INTRAMUSCULAR; INTRAVENOUS at 11:53

## 2023-01-01 RX ADMIN — SODIUM CHLORIDE, SODIUM LACTATE, POTASSIUM CHLORIDE, AND CALCIUM CHLORIDE 1000 ML: 600; 310; 30; 20 INJECTION, SOLUTION INTRAVENOUS at 06:15

## 2023-01-01 RX ADMIN — MORPHINE SULFATE 4 MG: 4 INJECTION INTRAVENOUS at 07:28

## 2023-01-01 RX ADMIN — SODIUM CHLORIDE, POTASSIUM CHLORIDE, SODIUM LACTATE AND CALCIUM CHLORIDE 1000 ML: 600; 310; 30; 20 INJECTION, SOLUTION INTRAVENOUS at 04:46

## 2023-01-01 RX ADMIN — MORPHINE SULFATE 4 MG: 4 INJECTION INTRAVENOUS at 10:09

## 2023-01-01 RX ADMIN — SODIUM CHLORIDE 1000 ML: 9 INJECTION, SOLUTION INTRAVENOUS at 06:05

## 2023-01-01 RX ADMIN — METOPROLOL TARTRATE 25 MG: 25 TABLET, FILM COATED ORAL at 20:59

## 2023-01-01 RX ADMIN — FLECAINIDE ACETATE 100 MG: 50 TABLET ORAL at 08:58

## 2023-01-01 RX ADMIN — ACETAMINOPHEN 650 MG: 325 TABLET ORAL at 21:56

## 2023-01-01 RX ADMIN — NYSTATIN 500000 UNITS: 100000 SUSPENSION ORAL at 20:57

## 2023-01-01 RX ADMIN — POTASSIUM PHOSPHATE, MONOBASIC AND POTASSIUM PHOSPHATE, DIBASIC 21 MMOL: 224; 236 INJECTION, SOLUTION, CONCENTRATE INTRAVENOUS at 11:28

## 2023-01-01 RX ADMIN — MORPHINE SULFATE 2 MG: 2 INJECTION, SOLUTION INTRAMUSCULAR; INTRAVENOUS at 20:16

## 2023-01-01 RX ADMIN — PERFLUTREN 1.5 ML OF DILUTION: 6.52 INJECTION, SUSPENSION INTRAVENOUS at 09:16

## 2023-01-01 RX ADMIN — GUAIFENESIN 1200 MG: 600 TABLET ORAL at 08:59

## 2023-01-01 RX ADMIN — GUAIFENESIN 1200 MG: 600 TABLET ORAL at 03:29

## 2023-01-01 RX ADMIN — MORPHINE SULFATE 4 MG: 4 INJECTION INTRAVENOUS at 11:53

## 2023-01-01 RX ADMIN — METOPROLOL TARTRATE 25 MG: 25 TABLET, FILM COATED ORAL at 20:05

## 2023-01-01 RX ADMIN — FLECAINIDE ACETATE 100 MG: 50 TABLET ORAL at 20:04

## 2023-01-01 RX ADMIN — POTASSIUM CHLORIDE 20 MEQ: 14.9 INJECTION, SOLUTION INTRAVENOUS at 13:16

## 2023-01-01 RX ADMIN — BACITRACIN 1 APPLICATION: 500 OINTMENT TOPICAL at 20:06

## 2023-01-01 RX ADMIN — METOPROLOL TARTRATE 5 MG: 5 INJECTION INTRAVENOUS at 04:14

## 2023-01-01 RX ADMIN — NYSTATIN 500000 UNITS: 100000 SUSPENSION ORAL at 21:51

## 2023-01-01 RX ADMIN — MORPHINE SULFATE 4 MG: 4 INJECTION INTRAVENOUS at 11:21

## 2023-01-01 RX ADMIN — DEXTROSE MONOHYDRATE 50 ML/HR: 50 INJECTION, SOLUTION INTRAVENOUS at 11:59

## 2023-01-01 RX ADMIN — FLECAINIDE ACETATE 100 MG: 50 TABLET ORAL at 08:21

## 2023-01-01 RX ADMIN — PIPERACILLIN SODIUM AND TAZOBACTAM SODIUM 4.5 G: 4; .5 INJECTION, SOLUTION INTRAVENOUS at 03:17

## 2023-01-01 RX ADMIN — Medication 21 PERCENT: at 10:06

## 2023-01-01 RX ADMIN — CEFAZOLIN SODIUM 1 G: 1 INJECTION, SOLUTION INTRAVENOUS at 04:59

## 2023-01-01 RX ADMIN — SODIUM CHLORIDE 1000 ML: 9 INJECTION, SOLUTION INTRAVENOUS at 00:54

## 2023-01-01 RX ADMIN — BACITRACIN 1 APPLICATION: 500 OINTMENT TOPICAL at 08:20

## 2023-01-01 RX ADMIN — MORPHINE SULFATE 4 MG: 4 INJECTION INTRAVENOUS at 13:28

## 2023-01-01 RX ADMIN — ATORVASTATIN CALCIUM 40 MG: 40 TABLET, FILM COATED ORAL at 21:30

## 2023-01-01 RX ADMIN — METOPROLOL TARTRATE 5 MG: 5 INJECTION INTRAVENOUS at 21:56

## 2023-01-01 RX ADMIN — INSULIN LISPRO 10 UNITS: 100 INJECTION, SOLUTION INTRAVENOUS; SUBCUTANEOUS at 08:33

## 2023-01-01 RX ADMIN — HYDROMORPHONE HYDROCHLORIDE 0.5 MG: 1 INJECTION, SOLUTION INTRAMUSCULAR; INTRAVENOUS; SUBCUTANEOUS at 16:49

## 2023-01-01 RX ADMIN — MORPHINE SULFATE 4 MG: 4 INJECTION INTRAVENOUS at 12:25

## 2023-01-01 RX ADMIN — METOPROLOL TARTRATE 5 MG: 5 INJECTION INTRAVENOUS at 11:31

## 2023-01-01 RX ADMIN — MORPHINE SULFATE 2 MG: 2 INJECTION, SOLUTION INTRAMUSCULAR; INTRAVENOUS at 15:21

## 2023-01-01 RX ADMIN — POTASSIUM CHLORIDE 20 MEQ: 14.9 INJECTION, SOLUTION INTRAVENOUS at 08:19

## 2023-01-01 RX ADMIN — CEFAZOLIN SODIUM 1 G: 1 INJECTION, SOLUTION INTRAVENOUS at 21:36

## 2023-01-01 RX ADMIN — INSULIN LISPRO 10 UNITS: 100 INJECTION, SOLUTION INTRAVENOUS; SUBCUTANEOUS at 17:19

## 2023-01-01 RX ADMIN — INSULIN LISPRO 12 UNITS: 100 INJECTION, SOLUTION INTRAVENOUS; SUBCUTANEOUS at 17:18

## 2023-01-01 RX ADMIN — VANCOMYCIN HYDROCHLORIDE 1000 MG: 1 INJECTION, SOLUTION INTRAVENOUS at 11:27

## 2023-01-01 RX ADMIN — MORPHINE SULFATE 4 MG: 4 INJECTION INTRAVENOUS at 08:33

## 2023-01-01 RX ADMIN — INSULIN LISPRO 4 UNITS: 100 INJECTION, SOLUTION INTRAVENOUS; SUBCUTANEOUS at 08:47

## 2023-01-01 RX ADMIN — DEXTROSE AND SODIUM CHLORIDE 200 ML/HR: 5; 450 INJECTION, SOLUTION INTRAVENOUS at 06:07

## 2023-01-01 RX ADMIN — NYSTATIN 500000 UNITS: 100000 SUSPENSION ORAL at 06:03

## 2023-01-01 RX ADMIN — SODIUM CHLORIDE 1000 ML: 9 INJECTION, SOLUTION INTRAVENOUS at 02:05

## 2023-01-01 RX ADMIN — MORPHINE SULFATE 4 MG: 4 INJECTION INTRAVENOUS at 15:03

## 2023-01-01 RX ADMIN — GLYCOPYRROLATE 0.2 MG: 0.2 INJECTION, SOLUTION INTRAMUSCULAR; INTRAVENOUS at 00:44

## 2023-01-01 SDOH — SOCIAL STABILITY: SOCIAL INSECURITY: ARE YOU OR HAVE YOU BEEN THREATENED OR ABUSED PHYSICALLY, EMOTIONALLY, OR SEXUALLY BY ANYONE?: NO

## 2023-01-01 SDOH — SOCIAL STABILITY: SOCIAL INSECURITY: DOES ANYONE TRY TO KEEP YOU FROM HAVING/CONTACTING OTHER FRIENDS OR DOING THINGS OUTSIDE YOUR HOME?: NO

## 2023-01-01 SDOH — SOCIAL STABILITY: SOCIAL INSECURITY: HAVE YOU HAD THOUGHTS OF HARMING ANYONE ELSE?: NO

## 2023-01-01 SDOH — SOCIAL STABILITY: SOCIAL INSECURITY: HAS ANYONE EVER THREATENED TO HURT YOUR FAMILY OR YOUR PETS?: NO

## 2023-01-01 SDOH — SOCIAL STABILITY: SOCIAL INSECURITY: ABUSE: ADULT

## 2023-01-01 SDOH — SOCIAL STABILITY: SOCIAL INSECURITY: DO YOU FEEL ANYONE HAS EXPLOITED OR TAKEN ADVANTAGE OF YOU FINANCIALLY OR OF YOUR PERSONAL PROPERTY?: NO

## 2023-01-01 SDOH — SOCIAL STABILITY: SOCIAL INSECURITY: WERE YOU ABLE TO COMPLETE ALL THE BEHAVIORAL HEALTH SCREENINGS?: YES

## 2023-01-01 SDOH — SOCIAL STABILITY: SOCIAL INSECURITY: DO YOU FEEL UNSAFE GOING BACK TO THE PLACE WHERE YOU ARE LIVING?: NO

## 2023-01-01 SDOH — SOCIAL STABILITY: SOCIAL INSECURITY: ARE THERE ANY APPARENT SIGNS OF INJURIES/BEHAVIORS THAT COULD BE RELATED TO ABUSE/NEGLECT?: NO

## 2023-01-01 ASSESSMENT — PAIN SCALES - GENERAL
PAINLEVEL_OUTOF10: 0 - NO PAIN
PAINLEVEL_OUTOF10: 0 - NO PAIN
PAINLEVEL_OUTOF10: 5 - MODERATE PAIN
PAINLEVEL_OUTOF10: 0 - NO PAIN

## 2023-01-01 ASSESSMENT — COGNITIVE AND FUNCTIONAL STATUS - GENERAL
DRESSING REGULAR LOWER BODY CLOTHING: TOTAL
EATING MEALS: A LOT
WALKING IN HOSPITAL ROOM: TOTAL
TOILETING: TOTAL
WALKING IN HOSPITAL ROOM: TOTAL
PERSONAL GROOMING: TOTAL
DRESSING REGULAR LOWER BODY CLOTHING: A LOT
HELP NEEDED FOR BATHING: TOTAL
PERSONAL GROOMING: A LOT
MOVING FROM LYING ON BACK TO SITTING ON SIDE OF FLAT BED WITH BEDRAILS: TOTAL
DRESSING REGULAR LOWER BODY CLOTHING: TOTAL
HELP NEEDED FOR BATHING: TOTAL
MOVING TO AND FROM BED TO CHAIR: TOTAL
MOVING TO AND FROM BED TO CHAIR: A LOT
EATING MEALS: TOTAL
MOVING FROM LYING ON BACK TO SITTING ON SIDE OF FLAT BED WITH BEDRAILS: A LOT
MOVING TO AND FROM BED TO CHAIR: TOTAL
MOBILITY SCORE: 6
DRESSING REGULAR UPPER BODY CLOTHING: TOTAL
STANDING UP FROM CHAIR USING ARMS: TOTAL
PERSONAL GROOMING: TOTAL
MOVING FROM LYING ON BACK TO SITTING ON SIDE OF FLAT BED WITH BEDRAILS: TOTAL
DAILY ACTIVITIY SCORE: 6
PATIENT BASELINE BEDBOUND: NO
CLIMB 3 TO 5 STEPS WITH RAILING: A LOT
STANDING UP FROM CHAIR USING ARMS: A LOT
EATING MEALS: A LOT
MOVING TO AND FROM BED TO CHAIR: TOTAL
HELP NEEDED FOR BATHING: TOTAL
STANDING UP FROM CHAIR USING ARMS: TOTAL
MOVING TO AND FROM BED TO CHAIR: TOTAL
DRESSING REGULAR LOWER BODY CLOTHING: A LOT
MOBILITY SCORE: 6
DRESSING REGULAR LOWER BODY CLOTHING: TOTAL
CLIMB 3 TO 5 STEPS WITH RAILING: A LOT
EATING MEALS: TOTAL
TURNING FROM BACK TO SIDE WHILE IN FLAT BAD: TOTAL
DRESSING REGULAR UPPER BODY CLOTHING: TOTAL
TURNING FROM BACK TO SIDE WHILE IN FLAT BAD: TOTAL
DRESSING REGULAR UPPER BODY CLOTHING: A LOT
STANDING UP FROM CHAIR USING ARMS: TOTAL
DRESSING REGULAR LOWER BODY CLOTHING: A LOT
MOVING FROM LYING ON BACK TO SITTING ON SIDE OF FLAT BED WITH BEDRAILS: A LOT
MOBILITY SCORE: 12
PERSONAL GROOMING: A LOT
DAILY ACTIVITIY SCORE: 12
TOILETING: TOTAL
EATING MEALS: TOTAL
WALKING IN HOSPITAL ROOM: TOTAL
DRESSING REGULAR LOWER BODY CLOTHING: TOTAL
MOBILITY SCORE: 12
HELP NEEDED FOR BATHING: A LOT
MOBILITY SCORE: 6
CLIMB 3 TO 5 STEPS WITH RAILING: TOTAL
EATING MEALS: A LOT
DRESSING REGULAR UPPER BODY CLOTHING: TOTAL
PERSONAL GROOMING: TOTAL
TOILETING: TOTAL
PERSONAL GROOMING: TOTAL
CLIMB 3 TO 5 STEPS WITH RAILING: TOTAL
PERSONAL GROOMING: TOTAL
STANDING UP FROM CHAIR USING ARMS: A LOT
DRESSING REGULAR UPPER BODY CLOTHING: TOTAL
HELP NEEDED FOR BATHING: TOTAL
TURNING FROM BACK TO SIDE WHILE IN FLAT BAD: TOTAL
STANDING UP FROM CHAIR USING ARMS: A LOT
WALKING IN HOSPITAL ROOM: A LOT
MOVING FROM LYING ON BACK TO SITTING ON SIDE OF FLAT BED WITH BEDRAILS: A LOT
TOILETING: A LOT
DAILY ACTIVITIY SCORE: 12
DAILY ACTIVITIY SCORE: 6
TOILETING: A LOT
MOBILITY SCORE: 6
TURNING FROM BACK TO SIDE WHILE IN FLAT BAD: A LOT
EATING MEALS: TOTAL
WALKING IN HOSPITAL ROOM: A LOT
TURNING FROM BACK TO SIDE WHILE IN FLAT BAD: TOTAL
DRESSING REGULAR LOWER BODY CLOTHING: TOTAL
STANDING UP FROM CHAIR USING ARMS: TOTAL
MOVING TO AND FROM BED TO CHAIR: A LOT
HELP NEEDED FOR BATHING: A LOT
CLIMB 3 TO 5 STEPS WITH RAILING: A LOT
MOVING TO AND FROM BED TO CHAIR: A LOT
DAILY ACTIVITIY SCORE: 6
DAILY ACTIVITIY SCORE: 6
STANDING UP FROM CHAIR USING ARMS: TOTAL
CLIMB 3 TO 5 STEPS WITH RAILING: TOTAL
WALKING IN HOSPITAL ROOM: TOTAL
TURNING FROM BACK TO SIDE WHILE IN FLAT BAD: A LOT
MOVING FROM LYING ON BACK TO SITTING ON SIDE OF FLAT BED WITH BEDRAILS: TOTAL
EATING MEALS: TOTAL
MOBILITY SCORE: 6
WALKING IN HOSPITAL ROOM: A LOT
WALKING IN HOSPITAL ROOM: TOTAL
CLIMB 3 TO 5 STEPS WITH RAILING: TOTAL
DAILY ACTIVITIY SCORE: 12
DRESSING REGULAR UPPER BODY CLOTHING: A LOT
MOBILITY SCORE: 12
TURNING FROM BACK TO SIDE WHILE IN FLAT BAD: A LOT
TURNING FROM BACK TO SIDE WHILE IN FLAT BAD: TOTAL
MOVING TO AND FROM BED TO CHAIR: TOTAL
TOILETING: TOTAL
DAILY ACTIVITIY SCORE: 6
CLIMB 3 TO 5 STEPS WITH RAILING: TOTAL
HELP NEEDED FOR BATHING: TOTAL
HELP NEEDED FOR BATHING: A LOT
TOILETING: A LOT
DRESSING REGULAR UPPER BODY CLOTHING: TOTAL
PERSONAL GROOMING: A LOT
TOILETING: TOTAL
MOVING FROM LYING ON BACK TO SITTING ON SIDE OF FLAT BED WITH BEDRAILS: TOTAL
MOVING FROM LYING ON BACK TO SITTING ON SIDE OF FLAT BED WITH BEDRAILS: TOTAL
DRESSING REGULAR UPPER BODY CLOTHING: A LOT

## 2023-01-01 ASSESSMENT — RESPIRATORY DISTRESS OBSERVATION SCALE (RDOS)
LOOK OF FEAR: 0 - NONE
HEART RATE PER MINUTE: 1 - 90-109 BEATS
ACCESSORY MUSCLE RISE IN CLAVICLE DURING INSPIRATION: 1 - SLIGHT RISE
GRUNTING AT END OF EXPIRATION: 0 - NONE
HEART RATE PER MINUTE: 0 - <90 BEATS
GRUNTING AT END OF EXPIRATION: 0 - NONE
RESTLESS NONPURPOSEFUL MOVEMENTS: 0 - NONE
INVOLUNTARY NASAL FLARING: 0 - NONE
GRUNTING AT END OF EXPIRATION: 0 - NONE
INVOLUNTARY NASAL FLARING: 0 - NONE
ACCESSORY MUSCLE RISE IN CLAVICLE DURING INSPIRATION: 2 - PRONOUNCED RISE
INVOLUNTARY NASAL FLARING: 0 - NONE
LOOK OF FEAR: 0 - NONE
LOOK OF FEAR: 0 - NONE
RESPIRATORY RATE PER MINUTE: 1 - 19-30 BREATHS
RDOS TOTAL SCORE: 3
PARADOXICAL BREATHING PATTERN: 0 - NONE
RDOS TOTAL SCORE: 6
INVOLUNTARY NASAL FLARING: 0 - NONE
PARADOXICAL BREATHING PATTERN: 2 - PRESENT
RESPIRATORY RATE PER MINUTE: 1 - 19-30 BREATHS
LOOK OF FEAR: 0 - NONE
ACCESSORY MUSCLE RISE IN CLAVICLE DURING INSPIRATION: 1 - SLIGHT RISE
RDOS TOTAL SCORE: 5
INVOLUNTARY NASAL FLARING: 0 - NONE
RDOS TOTAL SCORE: 4
RDOS TOTAL SCORE: 3
HEART RATE PER MINUTE: 1 - 90-109 BEATS
ACCESSORY MUSCLE RISE IN CLAVICLE DURING INSPIRATION: 1 - SLIGHT RISE
RESTLESS NONPURPOSEFUL MOVEMENTS: 0 - NONE
RESTLESS NONPURPOSEFUL MOVEMENTS: 0 - NONE
PARADOXICAL BREATHING PATTERN: 2 - PRESENT
GRUNTING AT END OF EXPIRATION: 0 - NONE
RESPIRATORY RATE PER MINUTE: 1 - 19-30 BREATHS
GRUNTING AT END OF EXPIRATION: 0 - NONE
RESPIRATORY RATE PER MINUTE: 1 - 19-30 BREATHS
HEART RATE PER MINUTE: 1 - 90-109 BEATS
PARADOXICAL BREATHING PATTERN: 2 - PRESENT
RESTLESS NONPURPOSEFUL MOVEMENTS: 0 - NONE
HEART RATE PER MINUTE: 0 - <90 BEATS
LOOK OF FEAR: 0 - NONE
RESTLESS NONPURPOSEFUL MOVEMENTS: 0 - NONE
RESPIRATORY RATE PER MINUTE: 1 - 19-30 BREATHS
PARADOXICAL BREATHING PATTERN: 0 - NONE
ACCESSORY MUSCLE RISE IN CLAVICLE DURING INSPIRATION: 2 - PRONOUNCED RISE

## 2023-01-01 ASSESSMENT — PAIN SCALES - PAIN ASSESSMENT IN ADVANCED DEMENTIA (PAINAD)
TOTALSCORE: ASLEEP
TOTALSCORE: 4
FACIALEXPRESSION: SMILING OR INEXPRESSIVE
CONSOLABILITY: NO NEED TO CONSOLE
TOTALSCORE: 0
BREATHING: OCCASIONAL LABORED BREATHING, SHORT PERIOD OF HYPERVENTILATION
BODYLANGUAGE: RELAXED
BREATHING: NORMAL
TOTALSCORE: 0
FACIALEXPRESSION: FACIAL GRIMACING
CONSOLABILITY: NO NEED TO CONSOLE
NEGVOCALIZATION: OCCASIONAL MOAN/GROAN, LOW SPEECH, NEGATIVE/DISAPPROVING QUALITY
BREATHING: NORMAL
BODYLANGUAGE: TENSE, DISTRESSED PACING, FIDGETING
BREATHING: NORMAL
FACIALEXPRESSION: SMILING OR INEXPRESSIVE
CONSOLABILITY: NO NEED TO CONSOLE
BODYLANGUAGE: RELAXED
TOTALSCORE: MEDICATION (SEE MAR)

## 2023-01-01 ASSESSMENT — ENCOUNTER SYMPTOMS
HEMATOLOGIC/LYMPHATIC NEGATIVE: 1
NEUROLOGICAL NEGATIVE: 1
GASTROINTESTINAL NEGATIVE: 1
PSYCHIATRIC NEGATIVE: 1
MUSCULOSKELETAL NEGATIVE: 1
RESPIRATORY NEGATIVE: 1
CONSTITUTIONAL NEGATIVE: 1
CARDIOVASCULAR NEGATIVE: 1

## 2023-01-01 ASSESSMENT — PAIN SCALES - WONG BAKER
WONGBAKER_NUMERICALRESPONSE: NO HURT

## 2023-01-01 ASSESSMENT — LIFESTYLE VARIABLES
AUDIT-C TOTAL SCORE: 0
AUDIT-C TOTAL SCORE: 0
SKIP TO QUESTIONS 9-10: 1
HOW MANY STANDARD DRINKS CONTAINING ALCOHOL DO YOU HAVE ON A TYPICAL DAY: PATIENT DOES NOT DRINK
SUBSTANCE_ABUSE_PAST_12_MONTHS: NO
HOW OFTEN DO YOU HAVE A DRINK CONTAINING ALCOHOL: NEVER
HOW OFTEN DO YOU HAVE 6 OR MORE DRINKS ON ONE OCCASION: NEVER
PRESCIPTION_ABUSE_PAST_12_MONTHS: NO

## 2023-01-01 ASSESSMENT — ACTIVITIES OF DAILY LIVING (ADL)
ADEQUATE_TO_COMPLETE_ADL: YES
PATIENT'S MEMORY ADEQUATE TO SAFELY COMPLETE DAILY ACTIVITIES?: NO
DRESSING YOURSELF: NEEDS ASSISTANCE
BATHING: NEEDS ASSISTANCE
FEEDING YOURSELF: NEEDS ASSISTANCE
HEARING - RIGHT EAR: FUNCTIONAL
JUDGMENT_ADEQUATE_SAFELY_COMPLETE_DAILY_ACTIVITIES: NO
HEARING - LEFT EAR: FUNCTIONAL
TOILETING: NEEDS ASSISTANCE
LACK_OF_TRANSPORTATION: NO
GROOMING: NEEDS ASSISTANCE
WALKS IN HOME: NEEDS ASSISTANCE

## 2023-01-01 ASSESSMENT — PAIN - FUNCTIONAL ASSESSMENT
PAIN_FUNCTIONAL_ASSESSMENT: 0-10
PAIN_FUNCTIONAL_ASSESSMENT: 0-10
PAIN_FUNCTIONAL_ASSESSMENT: CPOT (CRITICAL CARE PAIN OBSERVATION TOOL)
PAIN_FUNCTIONAL_ASSESSMENT: PAINAD (PAIN ASSESSMENT IN ADVANCED DEMENTIA SCALE)
PAIN_FUNCTIONAL_ASSESSMENT: 0-10
PAIN_FUNCTIONAL_ASSESSMENT: PAINAD (PAIN ASSESSMENT IN ADVANCED DEMENTIA SCALE)
PAIN_FUNCTIONAL_ASSESSMENT: CPOT (CRITICAL CARE PAIN OBSERVATION TOOL)
PAIN_FUNCTIONAL_ASSESSMENT: 0-10
PAIN_FUNCTIONAL_ASSESSMENT: PAINAD (PAIN ASSESSMENT IN ADVANCED DEMENTIA SCALE)
PAIN_FUNCTIONAL_ASSESSMENT: 0-10
PAIN_FUNCTIONAL_ASSESSMENT: 0-10
PAIN_FUNCTIONAL_ASSESSMENT: CPOT (CRITICAL CARE PAIN OBSERVATION TOOL)
PAIN_FUNCTIONAL_ASSESSMENT: 0-10
PAIN_FUNCTIONAL_ASSESSMENT: CPOT (CRITICAL CARE PAIN OBSERVATION TOOL)
PAIN_FUNCTIONAL_ASSESSMENT: 0-10

## 2023-01-01 ASSESSMENT — COLUMBIA-SUICIDE SEVERITY RATING SCALE - C-SSRS
2. HAVE YOU ACTUALLY HAD ANY THOUGHTS OF KILLING YOURSELF?: NO
1. IN THE PAST MONTH, HAVE YOU WISHED YOU WERE DEAD OR WISHED YOU COULD GO TO SLEEP AND NOT WAKE UP?: NO
1. IN THE PAST MONTH, HAVE YOU WISHED YOU WERE DEAD OR WISHED YOU COULD GO TO SLEEP AND NOT WAKE UP?: NO
6. HAVE YOU EVER DONE ANYTHING, STARTED TO DO ANYTHING, OR PREPARED TO DO ANYTHING TO END YOUR LIFE?: NO
6. HAVE YOU EVER DONE ANYTHING, STARTED TO DO ANYTHING, OR PREPARED TO DO ANYTHING TO END YOUR LIFE?: NO
2. HAVE YOU ACTUALLY HAD ANY THOUGHTS OF KILLING YOURSELF?: NO

## 2023-01-01 ASSESSMENT — PATIENT HEALTH QUESTIONNAIRE - PHQ9
2. FEELING DOWN, DEPRESSED OR HOPELESS: NOT AT ALL
1. LITTLE INTEREST OR PLEASURE IN DOING THINGS: NOT AT ALL
SUM OF ALL RESPONSES TO PHQ9 QUESTIONS 1 & 2: 0

## 2023-11-18 PROBLEM — E11.10 DKA, TYPE 2, NOT AT GOAL (MULTI): Status: ACTIVE | Noted: 2023-01-01

## 2023-11-18 NOTE — PROGRESS NOTES
"Néstor Castro is a 73 y.o. male on day 0 of admission presenting with DKA, type 2, not at goal (CMS/HCC).    Subjective   Patient extremely lethargic, not remaining awake long enough to answer questions. However, patient squeezed fingers in both hands on command this morning.    Patient's daughter arrived and further explained that patient has been unable to take care of himself over the past few weeks. Patient's wife  late last year and he has been living alone since. The daughter notes that she has noticed a sharp mental and physical decline in the patient over at least the past few months. She has had to repeatedly visit his home to clean. Notes that he has bilateral hip pain and has not been moving much. She is almost certain that he has not been taking his medications. States that patient is stubborn and has finally, as of 3 weeks ago, agreed to look into going to a nursing facility.       Objective     Physical Exam  Constitutional:       Appearance: He is obese.   HENT:      Head: Normocephalic.      Comments: Bruising left side of face.  Cardiovascular:      Rate and Rhythm: Tachycardia present. Rhythm irregular.      Pulses: Normal pulses.      Heart sounds: Normal heart sounds.   Pulmonary:      Effort: Pulmonary effort is normal.      Breath sounds: Normal breath sounds.   Musculoskeletal:      Right lower leg: Edema present.      Left lower leg: Edema present.   Skin:     General: Skin is warm and dry.      Comments: Bruising across mid sternal lower chest wall.   Neurological:      Mental Status: He is disoriented.         Last Recorded Vitals  Blood pressure 116/67, pulse (!) 139, temperature 36.1 °C (97 °F), resp. rate (!) 29, height 1.829 m (6' 0.01\"), weight 121 kg (266 lb 12.1 oz), SpO2 97 %.  Intake/Output last 3 Shifts:  No intake/output data recorded.    Relevant Results  Scheduled medications  aspirin, 81 mg, oral, q24h  atorvastatin, 40 mg, oral, Daily  [Held by provider] flecainide, " 100 mg, oral, BID  metoprolol, 5 mg, intravenous, q6h  [Held by provider] metoprolol tartrate, 25 mg, oral, BID  potassium chloride, 20 mEq, intravenous, q2h      Continuous medications  dextrose 10 % in water (D10W), 150 mL/hr  dextrose 5%-0.45 % sodium chloride, 200 mL/hr, Last Rate: 200 mL/hr (11/18/23 1519)  [Held by provider] heparin, 0-4,500 Units/hr  insulin regular, 10 Units/hr, Last Rate: 10 Units/hr (11/18/23 1012)  sodium chloride, 150 mL/hr, Last Rate: 150 mL/hr (11/18/23 0745)      PRN medications  PRN medications: dextrose 10 % in water (D10W), dextrose, heparin, insulin regular  Results for orders placed or performed during the hospital encounter of 11/18/23 (from the past 24 hour(s))   Renal function panel   Result Value Ref Range    Glucose 630 (HH) 74 - 99 mg/dL    Sodium 142 136 - 145 mmol/L    Potassium 3.8 3.5 - 5.3 mmol/L    Chloride 110 (H) 98 - 107 mmol/L    Bicarbonate 15 (L) 21 - 32 mmol/L    Anion Gap 21 (H) 10 - 20 mmol/L    Urea Nitrogen 85 (H) 6 - 23 mg/dL    Creatinine 2.59 (H) 0.50 - 1.30 mg/dL    eGFR 26 (L) >60 mL/min/1.73m*2    Calcium 9.6 8.6 - 10.3 mg/dL    Phosphorus 5.1 (H) 2.5 - 4.9 mg/dL    Albumin 3.6 3.4 - 5.0 g/dL   Magnesium   Result Value Ref Range    Magnesium 2.46 (H) 1.60 - 2.40 mg/dL   POCT GLUCOSE   Result Value Ref Range    POCT Glucose 484 (H) 74 - 99 mg/dL   POCT GLUCOSE   Result Value Ref Range    POCT Glucose 460 (H) 74 - 99 mg/dL   POCT GLUCOSE   Result Value Ref Range    POCT Glucose 448 (H) 74 - 99 mg/dL   Lactate   Result Value Ref Range    Lactate 1.9 0.4 - 2.0 mmol/L   BLOOD GAS VENOUS   Result Value Ref Range    POCT pH, Venous 7.29 (L) 7.33 - 7.43 pH    POCT pCO2, Venous 47 41 - 51 mm Hg    POCT pO2, Venous 35 35 - 45 mm Hg    POCT SO2, Venous 48 45 - 75 %    POCT Oxy Hemoglobin, Venous 47.6 45.0 - 75.0 %    POCT Base Excess, Venous -4.2 (L) -2.0 - 3.0 mmol/L    POCT HCO3 Calculated, Venous 22.6 22.0 - 26.0 mmol/L    Patient Temperature      FiO2 35 %     Test Comment G 2W GEM S    POCT GLUCOSE   Result Value Ref Range    POCT Glucose 409 (H) 74 - 99 mg/dL   Renal function panel   Result Value Ref Range    Glucose 397 (H) 74 - 99 mg/dL    Sodium 145 136 - 145 mmol/L    Potassium 3.3 (L) 3.5 - 5.3 mmol/L    Chloride 114 (H) 98 - 107 mmol/L    Bicarbonate 21 21 - 32 mmol/L    Anion Gap 13 10 - 20 mmol/L    Urea Nitrogen 82 (H) 6 - 23 mg/dL    Creatinine 2.42 (H) 0.50 - 1.30 mg/dL    eGFR 28 (L) >60 mL/min/1.73m*2    Calcium 9.6 8.6 - 10.3 mg/dL    Phosphorus 3.9 2.5 - 4.9 mg/dL    Albumin 3.6 3.4 - 5.0 g/dL   Magnesium   Result Value Ref Range    Magnesium 2.48 (H) 1.60 - 2.40 mg/dL   POCT GLUCOSE   Result Value Ref Range    POCT Glucose 319 (H) 74 - 99 mg/dL   POCT GLUCOSE   Result Value Ref Range    POCT Glucose 226 (H) 74 - 99 mg/dL   POCT GLUCOSE   Result Value Ref Range    POCT Glucose 218 (H) 74 - 99 mg/dL   POCT GLUCOSE   Result Value Ref Range    POCT Glucose 196 (H) 74 - 99 mg/dL   Renal function panel   Result Value Ref Range    Glucose 227 (H) 74 - 99 mg/dL    Sodium 150 (H) 136 - 145 mmol/L    Potassium 4.1 3.5 - 5.3 mmol/L    Chloride 116 (H) 98 - 107 mmol/L    Bicarbonate 19 (L) 21 - 32 mmol/L    Anion Gap 19 10 - 20 mmol/L    Urea Nitrogen 84 (H) 6 - 23 mg/dL    Creatinine 2.27 (H) 0.50 - 1.30 mg/dL    eGFR 30 (L) >60 mL/min/1.73m*2    Calcium 10.2 8.6 - 10.3 mg/dL    Phosphorus 3.8 2.5 - 4.9 mg/dL    Albumin 3.5 3.4 - 5.0 g/dL    US renal complete    Result Date: 11/18/2023  Interpreted By:  Daria Mckinney, STUDY: US RENAL COMPLETE; 11/18/2023 1:55 pm   INDICATION: Concern for hemorrhagic renal cyst.   COMPARISON: CT chest, abdomen and pelvis 11/17/2023   ACCESSION NUMBER(S): [id number]   ORDERING CLINICIAN: CRYSTAL JULIAN   TECHNIQUE: Sonography of the kidneys and urinary bladder was performed.   FINDINGS: Limited examination due to portable technique and patient's immobility.   Right Kidney: *Renal length: 10.2 cm *Parenchyma: Normal parenchymal  echogenicity. Normal parenchymal thickness. *Collecting system: No hydronephrosis. *Calculus: No echogenic, shadowing calculus. *Lesion: Previously shown renal cysts on the prior CT not visualized.     Left Kidney: *Renal length: 10.6 cm *Parenchyma: Normal parenchymal echogenicity. Normal parenchymal thickness. *Collecting system: No hydronephrosis. *Calculus: No echogenic, shadowing calculus. *Lesion: Previously shown renal cyst on the prior CT not visualized.   Bladder: Not visualized.       Limited examination due to portable technique and patient's immobility. Previously shown renal cysts on the prior CT not visualized.   No hydronephrosis.   MACRO: None   Signed by: Daria Mckinney 11/18/2023 2:18 PM Dictation workstation:   [id number]    CT thoracic spine wo IV contrast    Result Date: 11/18/2023  Interpreted By:  Porsche Read, STUDY: CT THORACIC SPINE WO IV CONTRAST; CT LUMBAR SPINE WO IV CONTRAST; 11/18/2023 12:27 am   INDICATION: Signs/Symptoms:fall.   COMPARISON: Same day CT of the chest, abdomen and pelvis.   ACCESSION NUMBER(S): [id number]; [id number]   ORDERING CLINICIAN: ROWAN AVILA   TECHNIQUE: Axial CT images of the thoracic and lumbar spine are obtained. Axial, coronal and sagittal reconstructions are submitted for review.   FINDINGS: THORACIC SPINE:   Thoracic vertebral alignment is maintained, without significant spondylolisthesis.   Multilevel insufficiency endplate changes with small Schmorl's nodes are present in the thoracic spine along the inferior endplates of T6, T7, T8 and T9, without evidence of compression fractures.   Posterior elements of the thoracic spine do not demonstrate any evidence of acute trauma. Well corticated ossific fragments along the spinous processes are T3, T4 and T5 likely represent degenerative changes or sequela of remote injuries.   Mild intervertebral disc height loss is present in the mid and lower thoracic spine.   Although the exam is not  optimized to assess the spinal canal, no high-grade spinal canal stenosis is identified.   Paraspinal musculature is unremarkable in appearance.   LUMBAR SPINE:   There are 5 lumbar type non rib-bearing vertebral bodies, with lowest well-formed intervertebral disc space labeled L5-S1.   No compression fracture is identified in the lumbar spine, although multilevel insufficiency endplate changes are present, with Schmorl's nodes noted along the inferior endplates of L4 and L5 and superior endplates of L5 and S1.   Posterior elements of the lumbar spine do not demonstrate any evidence of acute trauma. No abnormal intra spinous distance widening or displaced transverse or spinous process fractures are identified.   Moderate intervertebral disc height loss is present at L5-S1.   Although the exam is not optimized to assess the spinal canal, no high-grade stenosis is identified, likely mild spinal canal narrowing is present at L3-L4 and L4-L5 due to disc osteophyte complexes and ligamentum flavum thickening.   Paraspinal musculature does not demonstrate any acute abnormalities.       1.  No evidence of acute trauma or high-grade stenosis in the thoracic or lumbar spine. 2. Likely mild spinal canal narrowing is present at the levels of L3-L4 and L4-L5 due to disc osteophyte complexes.   MACRO: None   Signed by: Porsche Read 11/18/2023 1:14 AM Dictation workstation:   [id number]    CT lumbar spine wo IV contrast    Result Date: 11/18/2023  Interpreted By:  Porsche Read, STUDY: CT THORACIC SPINE WO IV CONTRAST; CT LUMBAR SPINE WO IV CONTRAST; 11/18/2023 12:27 am   INDICATION: Signs/Symptoms:fall.   COMPARISON: Same day CT of the chest, abdomen and pelvis.   ACCESSION NUMBER(S): [id number]; [id number]   ORDERING CLINICIAN: ROWAN AVIAL   TECHNIQUE: Axial CT images of the thoracic and lumbar spine are obtained. Axial, coronal and sagittal reconstructions are submitted for review.   FINDINGS: THORACIC  SPINE:   Thoracic vertebral alignment is maintained, without significant spondylolisthesis.   Multilevel insufficiency endplate changes with small Schmorl's nodes are present in the thoracic spine along the inferior endplates of T6, T7, T8 and T9, without evidence of compression fractures.   Posterior elements of the thoracic spine do not demonstrate any evidence of acute trauma. Well corticated ossific fragments along the spinous processes are T3, T4 and T5 likely represent degenerative changes or sequela of remote injuries.   Mild intervertebral disc height loss is present in the mid and lower thoracic spine.   Although the exam is not optimized to assess the spinal canal, no high-grade spinal canal stenosis is identified.   Paraspinal musculature is unremarkable in appearance.   LUMBAR SPINE:   There are 5 lumbar type non rib-bearing vertebral bodies, with lowest well-formed intervertebral disc space labeled L5-S1.   No compression fracture is identified in the lumbar spine, although multilevel insufficiency endplate changes are present, with Schmorl's nodes noted along the inferior endplates of L4 and L5 and superior endplates of L5 and S1.   Posterior elements of the lumbar spine do not demonstrate any evidence of acute trauma. No abnormal intra spinous distance widening or displaced transverse or spinous process fractures are identified.   Moderate intervertebral disc height loss is present at L5-S1.   Although the exam is not optimized to assess the spinal canal, no high-grade stenosis is identified, likely mild spinal canal narrowing is present at L3-L4 and L4-L5 due to disc osteophyte complexes and ligamentum flavum thickening.   Paraspinal musculature does not demonstrate any acute abnormalities.       1.  No evidence of acute trauma or high-grade stenosis in the thoracic or lumbar spine. 2. Likely mild spinal canal narrowing is present at the levels of L3-L4 and L4-L5 due to disc osteophyte complexes.    MACRO: None   Signed by: Porsche Read 11/18/2023 1:14 AM Dictation workstation:   [id number]    CT chest abdomen pelvis wo IV contrast    Result Date: 11/18/2023  Interpreted By:  Porsche Read, STUDY: CT CHEST ABDOMEN PELVIS WO CONTRAST;  11/18/2023 12:27 am   INDICATION: Signs/Symptoms:pain.   COMPARISON: None.   ACCESSION NUMBER(S): [id number]   ORDERING CLINICIAN: ROWAN AVILA   TECHNIQUE: CT of the chest, abdomen and pelvis was performed. Contiguous axial images were obtained at 3 mm slice thickness through the chest, abdomen and pelvis. Coronal and sagittal reconstructions at 3 mm slice thickness were performed.  No intravenous or oral contrast agents were administered.   FINDINGS: Please note that the study is limited without intravenous contrast.   CHEST:     LUNG/PLEURA/LARGE AIRWAYS: Trachea and central airways are patent, without evidence of endobronchial lesion. There is a diverticulum arising from right mainstem bronchus projecting into the subcarinal region.   Within limitations of somewhat motion degraded exam, no pleural effusions, consolidation or pneumothorax is evident.   Several tiny 2-3 mm nodules are present in the lungs bilaterally. No suspicious lung masses are identified.   VESSELS: Aorta and pulmonary arteries are normal caliber.  Moderate vascular calcifications are present in the abdominal aorta. Moderate coronary artery calcifications are present.   HEART: Heart is normal in size with pericardial calcifications present. No pericardial effusion is noted.   MEDIASTINUM AND DUDLEY: No mediastinal, hilar or axillary lymph nodes are present.  The esophagus is unremarkable in appearance. There is no evidence of pneumomediastinum.   CHEST WALL AND LOWER NECK: Soft tissues of the chest wall do not demonstrate any acute abnormalities. No displaced rib fractures or other osseous injuries are identified.   ABDOMEN:   LIVER: Liver is normal in size and within limits of noncontrast  exam does not demonstrate any acute abnormalities. No perihepatic free fluid is evident.   BILE DUCTS: No intrahepatic or extrahepatic biliary dilatation is present.   GALLBLADDER: Gallbladder is unremarkable in appearance.   PANCREAS: No pancreatic ductal dilatation or peripancreatic stranding is evident.   SPLEEN: Spleen is unremarkable in appearance.   ADRENAL GLANDS: Adrenal glands are unremarkable.   KIDNEYS AND URETERS: Kidneys are symmetric in size without evidence of hydronephrosis bilaterally. Several lobulated low-density lesions arise from the right kidney, possibly representing cysts. No ureteral dilatation is evident.   PELVIS:   BLADDER: Bladder is unremarkable in appearance.   REPRODUCTIVE ORGANS: Prostate is enlarged.   BOWEL: Small bowel is nondilated. No inflammatory wall thickening or abnormal dilatation is present in the large bowel. Appendix is unremarkable in appearance in the right lower quadrant.   Scattered diverticula are present in the sigmoid colon without evidence of acute diverticulitis.   VESSELS: There is no aneurysmal dilatation of the abdominal aorta. The IVC is within normal limits. Extensive vascular calcifications are present in the abdominal aorta.   PERITONEUM/RETROPERITONEUM/LYMPH NODES: There is no evidence of free air, free fluid, or thick-walled collections in the abdomen and pelvis. No enlarged lymphadenopathy is evident.   ABDOMINAL WALL: The abdominal wall soft tissues appear normal.   BONES: Please refer to separately dictated CT of the thoracic and lumbar spine for further characterization of spinal findings.   No acute osseous abnormality is identified in the pelvis.       1. No evidence of acute abnormality is present in the chest, abdomen or pelvis. No evidence of acute trauma. 2. Moderate coronary artery calcifications, with pericardial calcifications. Correlate with restrictive pericarditis. 3. Several 2-3 mm solid nodules are present in the upper lungs  bilaterally, likely benign by size criteria if there is no history of malignancy, although if patient is has risk factors for lung cancer, yearly screening CT is warranted. 4. Scattered diverticula are present in the sigmoid colon without evidence of acute diverticulitis. 5. Low-density lobulated lesions in the lower pole of the right kidney are incompletely characterized on current exam, although cyst cystically likely represent cysts. Slightly more hyperdense exophytic lesion arising from the lower pole of the left kidney may represent a proteinaceous or hemorrhagic cyst but is incompletely characterized on study. 6. Additional findings as detailed above.     MACRO: None   Signed by: Porsche Read 11/18/2023 1:05 AM Dictation workstation:   [id number]    CT head wo IV contrast    Result Date: 11/18/2023  Interpreted By:  Porsche Read, STUDY: CT HEAD WO IV CONTRAST; CT CERVICAL SPINE WO IV CONTRAST; CT FACIAL BONES WO IV CONTRAST; CT 3D RECONSTRUCTION;  11/18/2023 12:28 am   INDICATION: Signs/Symptoms:altered mental state; Signs/Symptoms:pain; Signs/Symptoms:contusion; Signs/Symptoms:possible fall contusion.   COMPARISON: None.   ACCESSION NUMBER(S): [id number]; [id number]; [id number]; [id number]   ORDERING CLINICIAN: ROWAN AVILA   TECHNIQUE: Noncontrast axial CT scan of head was performed, with coronal and sagittal reformats provided. The images were reviewed in bone, brain, blood and soft tissue windows.   Thin cut axial CT images through the facial bones were obtained and reconstructed in the coronal and sagittal plane. 3D reconstruction was created on a dedicated workstation and provided for interpretation.   Axial CT images of the cervical spine are obtained. Axial, coronal and sagittal reconstructions are provided for review.   FINDINGS: CT HEAD:   No hyperdense intracranial hemorrhage is evident. There is no mass effect or midline shift.   Gray-white differentiation is intact,  without evidence of CT apparent transcortical infarct.   There is somewhat disproportionate enlargement of the supratentorial ventricular system relative to basal cisterns nonspecific finding that may represent asymmetric central volume loss or normal pressure hydrocephalus. No abnormal extra-axial fluid collections are identified. Basal cisterns are patent.   Scalp soft tissues do not demonstrate any acute abnormalities. Calvarium is unremarkable in appearance without evidence of depressed skull fracture. Mastoid air cells and middle ear cavities are well aerated without evidence of fluid fluid levels.   CT FACIAL BONES:   Mild left supraorbital and periorbital soft tissue swelling and edema is present, without evidence of associated fracture. Bony orbits are intact.   Intraorbital structures are symmetric in appearance without evidence of acute trauma.   Facial bones appear intact without evidence of displaced fracture. Nasal bones are unremarkable in appearance.   Visualized paranasal sinuses are well aerated without evidence of fluid fluid levels.   Soft tissues overlying the face and skull base do not demonstrate any acute abnormality.   There are numerous missing teeth, with dental cavities and periapical lucencies present in the remaining lower incisors. Temporomandibular joints are intact. No trauma to the oral cavity is identified.   Buccal soft tissues do not demonstrate any acute abnormalities. No fluid collections or soft tissue gas is identified. Parotid and submandibular glands are unremarkable in appearance.   CT C-SPINE:   There is straightening of normal lordotic curvature of the cervical spine, with a 1-2 mm anterolisthesis of C4 on C5.   No compression fractures are identified in the cervical spine, although mild insufficiency endplate changes with associated Schmorl's nodes are present along the inferior endplate of C6 and superior endplate of C7.   Posterior elements of the cervical spine do not  demonstrate any evidence of acute trauma. No abnormal intra spinous distance widening or displaced transverse or spinous process fractures are identified.   Craniocervical junction is intact. Facet joints are preserved with mild degenerate facet osteoarthropathy present at several levels, most pronounced at C3-C4 and C4-C5 on the left.   Mild intervertebral disc height loss is present at C4-C5 and C6-C7.   No high-grade spinal canal stenosis is identified. Mild spinal canal narrowing is possible at the level of C3-C4 due to mild disc bulge and ligamentum flavum thickening with slight effacement of the cervical spinal cord.   Mild neural foraminal narrowing is present at C4-C5 on the left due to endplate spurring and hypertrophic facet changes.   Prevertebral and paraspinal soft tissues do not demonstrate any acute abnormalities.       CT HEAD: 1. No evidence of hemorrhage, depressed skull fracture, or other acute intracranial trauma. 2. Disproportionate enlargement of the supratentorial ventricular system relative to basal cisterns and sulci, nonspecific finding that may represent asymmetric central volume loss or normal pressure hydrocephalus.   CT FACIAL BONES: 1. Left periorbital and supraorbital soft tissue swelling, without evidence of displaced orbital or facial bone fracture. 2. Large dental cavities and periapical lucencies are present in the few remaining lower incisors. Correlate with dental exam.   CT C-SPINE: 1. No evidence of acute trauma to the cervical spine.   MACRO: None   Signed by: Porsche Read 11/18/2023 12:49 AM Dictation workstation:   [id number]    CT cervical spine wo IV contrast    Result Date: 11/18/2023  Interpreted By:  Porsche Read, STUDY: CT HEAD WO IV CONTRAST; CT CERVICAL SPINE WO IV CONTRAST; CT FACIAL BONES WO IV CONTRAST; CT 3D RECONSTRUCTION;  11/18/2023 12:28 am   INDICATION: Signs/Symptoms:altered mental state; Signs/Symptoms:pain; Signs/Symptoms:contusion;  Signs/Symptoms:possible fall contusion.   COMPARISON: None.   ACCESSION NUMBER(S): [id number]; [id number]; [id number]; [id number]   ORDERING CLINICIAN: ROWAN AVILA   TECHNIQUE: Noncontrast axial CT scan of head was performed, with coronal and sagittal reformats provided. The images were reviewed in bone, brain, blood and soft tissue windows.   Thin cut axial CT images through the facial bones were obtained and reconstructed in the coronal and sagittal plane. 3D reconstruction was created on a dedicated workstation and provided for interpretation.   Axial CT images of the cervical spine are obtained. Axial, coronal and sagittal reconstructions are provided for review.   FINDINGS: CT HEAD:   No hyperdense intracranial hemorrhage is evident. There is no mass effect or midline shift.   Gray-white differentiation is intact, without evidence of CT apparent transcortical infarct.   There is somewhat disproportionate enlargement of the supratentorial ventricular system relative to basal cisterns nonspecific finding that may represent asymmetric central volume loss or normal pressure hydrocephalus. No abnormal extra-axial fluid collections are identified. Basal cisterns are patent.   Scalp soft tissues do not demonstrate any acute abnormalities. Calvarium is unremarkable in appearance without evidence of depressed skull fracture. Mastoid air cells and middle ear cavities are well aerated without evidence of fluid fluid levels.   CT FACIAL BONES:   Mild left supraorbital and periorbital soft tissue swelling and edema is present, without evidence of associated fracture. Bony orbits are intact.   Intraorbital structures are symmetric in appearance without evidence of acute trauma.   Facial bones appear intact without evidence of displaced fracture. Nasal bones are unremarkable in appearance.   Visualized paranasal sinuses are well aerated without evidence of fluid fluid levels.   Soft tissues overlying the face and  skull base do not demonstrate any acute abnormality.   There are numerous missing teeth, with dental cavities and periapical lucencies present in the remaining lower incisors. Temporomandibular joints are intact. No trauma to the oral cavity is identified.   Buccal soft tissues do not demonstrate any acute abnormalities. No fluid collections or soft tissue gas is identified. Parotid and submandibular glands are unremarkable in appearance.   CT C-SPINE:   There is straightening of normal lordotic curvature of the cervical spine, with a 1-2 mm anterolisthesis of C4 on C5.   No compression fractures are identified in the cervical spine, although mild insufficiency endplate changes with associated Schmorl's nodes are present along the inferior endplate of C6 and superior endplate of C7.   Posterior elements of the cervical spine do not demonstrate any evidence of acute trauma. No abnormal intra spinous distance widening or displaced transverse or spinous process fractures are identified.   Craniocervical junction is intact. Facet joints are preserved with mild degenerate facet osteoarthropathy present at several levels, most pronounced at C3-C4 and C4-C5 on the left.   Mild intervertebral disc height loss is present at C4-C5 and C6-C7.   No high-grade spinal canal stenosis is identified. Mild spinal canal narrowing is possible at the level of C3-C4 due to mild disc bulge and ligamentum flavum thickening with slight effacement of the cervical spinal cord.   Mild neural foraminal narrowing is present at C4-C5 on the left due to endplate spurring and hypertrophic facet changes.   Prevertebral and paraspinal soft tissues do not demonstrate any acute abnormalities.       CT HEAD: 1. No evidence of hemorrhage, depressed skull fracture, or other acute intracranial trauma. 2. Disproportionate enlargement of the supratentorial ventricular system relative to basal cisterns and sulci, nonspecific finding that may represent  asymmetric central volume loss or normal pressure hydrocephalus.   CT FACIAL BONES: 1. Left periorbital and supraorbital soft tissue swelling, without evidence of displaced orbital or facial bone fracture. 2. Large dental cavities and periapical lucencies are present in the few remaining lower incisors. Correlate with dental exam.   CT C-SPINE: 1. No evidence of acute trauma to the cervical spine.   MACRO: None   Signed by: Porsche Read 11/18/2023 12:49 AM Dictation workstation:   [id number]    CT maxillofacial bones wo IV contrast    Result Date: 11/18/2023  Interpreted By:  Porsche Read, STUDY: CT HEAD WO IV CONTRAST; CT CERVICAL SPINE WO IV CONTRAST; CT FACIAL BONES WO IV CONTRAST; CT 3D RECONSTRUCTION;  11/18/2023 12:28 am   INDICATION: Signs/Symptoms:altered mental state; Signs/Symptoms:pain; Signs/Symptoms:contusion; Signs/Symptoms:possible fall contusion.   COMPARISON: None.   ACCESSION NUMBER(S): [id number]; [id number]; [id number]; [id number]   ORDERING CLINICIAN: ROWAN AVILA   TECHNIQUE: Noncontrast axial CT scan of head was performed, with coronal and sagittal reformats provided. The images were reviewed in bone, brain, blood and soft tissue windows.   Thin cut axial CT images through the facial bones were obtained and reconstructed in the coronal and sagittal plane. 3D reconstruction was created on a dedicated workstation and provided for interpretation.   Axial CT images of the cervical spine are obtained. Axial, coronal and sagittal reconstructions are provided for review.   FINDINGS: CT HEAD:   No hyperdense intracranial hemorrhage is evident. There is no mass effect or midline shift.   Gray-white differentiation is intact, without evidence of CT apparent transcortical infarct.   There is somewhat disproportionate enlargement of the supratentorial ventricular system relative to basal cisterns nonspecific finding that may represent asymmetric central volume loss or normal  pressure hydrocephalus. No abnormal extra-axial fluid collections are identified. Basal cisterns are patent.   Scalp soft tissues do not demonstrate any acute abnormalities. Calvarium is unremarkable in appearance without evidence of depressed skull fracture. Mastoid air cells and middle ear cavities are well aerated without evidence of fluid fluid levels.   CT FACIAL BONES:   Mild left supraorbital and periorbital soft tissue swelling and edema is present, without evidence of associated fracture. Bony orbits are intact.   Intraorbital structures are symmetric in appearance without evidence of acute trauma.   Facial bones appear intact without evidence of displaced fracture. Nasal bones are unremarkable in appearance.   Visualized paranasal sinuses are well aerated without evidence of fluid fluid levels.   Soft tissues overlying the face and skull base do not demonstrate any acute abnormality.   There are numerous missing teeth, with dental cavities and periapical lucencies present in the remaining lower incisors. Temporomandibular joints are intact. No trauma to the oral cavity is identified.   Buccal soft tissues do not demonstrate any acute abnormalities. No fluid collections or soft tissue gas is identified. Parotid and submandibular glands are unremarkable in appearance.   CT C-SPINE:   There is straightening of normal lordotic curvature of the cervical spine, with a 1-2 mm anterolisthesis of C4 on C5.   No compression fractures are identified in the cervical spine, although mild insufficiency endplate changes with associated Schmorl's nodes are present along the inferior endplate of C6 and superior endplate of C7.   Posterior elements of the cervical spine do not demonstrate any evidence of acute trauma. No abnormal intra spinous distance widening or displaced transverse or spinous process fractures are identified.   Craniocervical junction is intact. Facet joints are preserved with mild degenerate facet  osteoarthropathy present at several levels, most pronounced at C3-C4 and C4-C5 on the left.   Mild intervertebral disc height loss is present at C4-C5 and C6-C7.   No high-grade spinal canal stenosis is identified. Mild spinal canal narrowing is possible at the level of C3-C4 due to mild disc bulge and ligamentum flavum thickening with slight effacement of the cervical spinal cord.   Mild neural foraminal narrowing is present at C4-C5 on the left due to endplate spurring and hypertrophic facet changes.   Prevertebral and paraspinal soft tissues do not demonstrate any acute abnormalities.       CT HEAD: 1. No evidence of hemorrhage, depressed skull fracture, or other acute intracranial trauma. 2. Disproportionate enlargement of the supratentorial ventricular system relative to basal cisterns and sulci, nonspecific finding that may represent asymmetric central volume loss or normal pressure hydrocephalus.   CT FACIAL BONES: 1. Left periorbital and supraorbital soft tissue swelling, without evidence of displaced orbital or facial bone fracture. 2. Large dental cavities and periapical lucencies are present in the few remaining lower incisors. Correlate with dental exam.   CT C-SPINE: 1. No evidence of acute trauma to the cervical spine.   MACRO: None   Signed by: Porsche Read 11/18/2023 12:49 AM Dictation workstation:   [id number]    CT 3D reconstruction    Result Date: 11/18/2023  Interpreted By:  Porsche Read, STUDY: CT HEAD WO IV CONTRAST; CT CERVICAL SPINE WO IV CONTRAST; CT FACIAL BONES WO IV CONTRAST; CT 3D RECONSTRUCTION;  11/18/2023 12:28 am   INDICATION: Signs/Symptoms:altered mental state; Signs/Symptoms:pain; Signs/Symptoms:contusion; Signs/Symptoms:possible fall contusion.   COMPARISON: None.   ACCESSION NUMBER(S): [id number]; [id number]; [id number]; [id number]   ORDERING CLINICIAN: ROWAN AVILA   TECHNIQUE: Noncontrast axial CT scan of head was performed, with coronal and  sagittal reformats provided. The images were reviewed in bone, brain, blood and soft tissue windows.   Thin cut axial CT images through the facial bones were obtained and reconstructed in the coronal and sagittal plane. 3D reconstruction was created on a dedicated workstation and provided for interpretation.   Axial CT images of the cervical spine are obtained. Axial, coronal and sagittal reconstructions are provided for review.   FINDINGS: CT HEAD:   No hyperdense intracranial hemorrhage is evident. There is no mass effect or midline shift.   Gray-white differentiation is intact, without evidence of CT apparent transcortical infarct.   There is somewhat disproportionate enlargement of the supratentorial ventricular system relative to basal cisterns nonspecific finding that may represent asymmetric central volume loss or normal pressure hydrocephalus. No abnormal extra-axial fluid collections are identified. Basal cisterns are patent.   Scalp soft tissues do not demonstrate any acute abnormalities. Calvarium is unremarkable in appearance without evidence of depressed skull fracture. Mastoid air cells and middle ear cavities are well aerated without evidence of fluid fluid levels.   CT FACIAL BONES:   Mild left supraorbital and periorbital soft tissue swelling and edema is present, without evidence of associated fracture. Bony orbits are intact.   Intraorbital structures are symmetric in appearance without evidence of acute trauma.   Facial bones appear intact without evidence of displaced fracture. Nasal bones are unremarkable in appearance.   Visualized paranasal sinuses are well aerated without evidence of fluid fluid levels.   Soft tissues overlying the face and skull base do not demonstrate any acute abnormality.   There are numerous missing teeth, with dental cavities and periapical lucencies present in the remaining lower incisors. Temporomandibular joints are intact. No trauma to the oral cavity is  identified.   Buccal soft tissues do not demonstrate any acute abnormalities. No fluid collections or soft tissue gas is identified. Parotid and submandibular glands are unremarkable in appearance.   CT C-SPINE:   There is straightening of normal lordotic curvature of the cervical spine, with a 1-2 mm anterolisthesis of C4 on C5.   No compression fractures are identified in the cervical spine, although mild insufficiency endplate changes with associated Schmorl's nodes are present along the inferior endplate of C6 and superior endplate of C7.   Posterior elements of the cervical spine do not demonstrate any evidence of acute trauma. No abnormal intra spinous distance widening or displaced transverse or spinous process fractures are identified.   Craniocervical junction is intact. Facet joints are preserved with mild degenerate facet osteoarthropathy present at several levels, most pronounced at C3-C4 and C4-C5 on the left.   Mild intervertebral disc height loss is present at C4-C5 and C6-C7.   No high-grade spinal canal stenosis is identified. Mild spinal canal narrowing is possible at the level of C3-C4 due to mild disc bulge and ligamentum flavum thickening with slight effacement of the cervical spinal cord.   Mild neural foraminal narrowing is present at C4-C5 on the left due to endplate spurring and hypertrophic facet changes.   Prevertebral and paraspinal soft tissues do not demonstrate any acute abnormalities.       CT HEAD: 1. No evidence of hemorrhage, depressed skull fracture, or other acute intracranial trauma. 2. Disproportionate enlargement of the supratentorial ventricular system relative to basal cisterns and sulci, nonspecific finding that may represent asymmetric central volume loss or normal pressure hydrocephalus.   CT FACIAL BONES: 1. Left periorbital and supraorbital soft tissue swelling, without evidence of displaced orbital or facial bone fracture. 2. Large dental cavities and periapical  lucencies are present in the few remaining lower incisors. Correlate with dental exam.   CT C-SPINE: 1. No evidence of acute trauma to the cervical spine.   MACRO: None   Signed by: Porsche Read 11/18/2023 12:49 AM Dictation workstation:   [id number]          This patient has a urinary catheter   Reason for the urinary catheter remaining today? critically ill patient who need accurate urinary output measurements       Assessment/Plan   Principal Problem:    DKA, type 2, not at goal (CMS/[facility])    Néstor Castro is a 73 y.o. malewith past medical history of HF with Improved EF, Atrial fibrillation on Eliquis, non obstructive coronary artery disease, aortic stenosis, sleep apnea admitted for DKA and Altered mental status.     Neurology  #Acute Encephalopathy  - Likely metabolic in nature in the setting of DKA  - Treat underlying cause     Pulmonology   #Lung Nodules  - Outpatient Ct in 12 months     Cardiology  #Heart Failure with Improved EF  - Last echocardiogram from 12/2020 showed EF of 55  - Hold Lasix, lisinopril and Jardiance    #Atrial Fibrillation with Rapid Ventricular Response  - Hold PO Metoprolol 25 twice daily and Flecainide 100 twice daily while patient is unconscious  - Hold Anticoagulant due to Multiple bruises after fall and possible hemorrhagic renal cyst on CT  - IV metoprolol 5 q6h until patient is awake to take PO meds  - TTE ordered    #HLD  #Non Obstructive CAD  -Continue aspirin and atorvastatin     Gastroenterology   No acute issues     Renal   #Acute Kidney Injury   -Pre renal in nature in nature   -Baseline Creatine< 1     # proteinaceous or hemorrhagic cyst  - noted on CT, Renal US exam is limited without cysts observed  - Urology consulted, appreciate recs     #Rhabdomyolysis  -likely due to fall  - On D5 nacl 0.45 200 ml/hr    #Hypernatremia  -corrected sodium remains ~150  -cont fluids    Endocrinology  #Diabetic Ketoacidosis  #Uncontrolled Type 2 DM  -Patient last A1C was  11.7 on 03/06  -Home regimen: Lantus 50 unit twice daily, humalog 10 unit before meal, and empagliflozin 10 daily  -Gap closed, BG sitting around 200-250, not awake or eating  Plan  -On Insulin Drip at a rate of 5unit  -D5 and NACL 0.45 200 ML/HR  - RFP and Mg Every 4 hours  - A1c and Beta-hydroxybutyrate pending  - DKA protocol in place  - Hold Empagliflozin     Hematology   -No acute issues     Infectious Disease  #Leukocytosis   -Likely reactive in the setting of DKA      Musculoskeletal  #Fall  #Multiple Bruises  - History of multiple falls  -PT/OT consulted  - Monitor bruises for now     Access: piv  Diet: NPO  GI Prophylaxis: none  DVT: SCD  Antibiotics: None  Disposition: Patient admitted for DKA and acute encephalopathy. Gap closed. BG < 250. Remaining on insulin gtt and dextrose until able to tolerate diet.       Messi Mccormick, DO  Internal Medicine PGY1

## 2023-11-18 NOTE — PROGRESS NOTES
Occupational Therapy                 Therapy Communication Note    Patient Name: Néstor Castro  MRN: [id number]  Today's Date: 11/18/2023     Discipline: Occupational Therapy    Missed Visit Reason: Missed Visit Reason: Patient placed on medical hold (Per RN, pt is not approapriate for therapy services this date. Pt non-responsive, unable to answer questions, unable to participate in therapeutic activity. No OT evaluation.)    Missed Time: Attempt    Comment: 9:49AM

## 2023-11-18 NOTE — PROGRESS NOTES
11/18/23 1221   Discharge Planning   Living Arrangements Alone   Support Systems Children   Assistance Needed TBD   Type of Residence Private residence   Home or Post Acute Services Post acute facilities (Rehab/SNF/etc)   Type of Post Acute Facility Services Skilled nursing   Patient expects to be discharged to: TBD   Does the patient need discharge transport arranged? Yes   RoundTrip coordination needed? Yes   Has discharge transport been arranged? No   Patient Choice   Provider Choice list and CMS website (https://medicare.gov/care-compare#search) for post-acute Quality and Resource Measure Data were provided and reviewed with: Family   Patient / Family choosing to utilize agency / facility established prior to hospitalization No     11/18/23: Patient admitted for DKA and AMS. Per note, MD spoke with daughter - advised that patient has had multiple falls, self care deficit and non-compliant with medications. Attempted to call daughter Lauren and went to voicemail. PTOT attempted to see patient, unable to due to patient unresponsive. Will continue to follow for discharge planning needs. Plan TBD.

## 2023-11-18 NOTE — ED PROVIDER NOTES
"HPI   Chief Complaint   Patient presents with    Fall     REPORTED BY ems- FOUND AT HOME ON FLOOR, LAST KNOWN INTERACTION WITH ANYONE 4 DAYS AGO, \"FOUND ON LEEFT SIDE, PRONE, MID AND LEFT CHEST        73-year-old male history of CHF, Atrial fibrillation on Eliquis, CAD, aortic stenosis, sleep apnea, and poor medical compliance brought to the ED by EMS found down unknown duration of time by family.  According to the report that was provided patient was last seen by family late Tuesday and did not respond the last several days until family showed up this morning and found him face down with minimal responsiveness and altered which prompted them to call EMS.  EMS had noted that patient had caked on stool and urine all over his body and was only alert to self and not time or place.  Patient was unable to provide a information to them regarding what had occurred or happened.  Patient upon arrival to ED was listless but easily arousable and alert to self and not time or place.  Patient is a poor historian unable to provide any information and no other history is available at this time.  Family still pending on their arrival to the ED for further information to be gathered from them.      History provided by:  EMS personnel and medical records  History limited by:  Mental status change   used: No                        Sussy Coma Scale Score: 15                  Patient History   History reviewed. No pertinent past medical history.  History reviewed. No pertinent surgical history.  No family history on file.  Social History     Tobacco Use    Smoking status: Former     Types: Cigarettes    Smokeless tobacco: Not on file   Substance Use Topics    Alcohol use: Not Currently     Comment: PREVIOUSLY    Drug use: Never       Physical Exam   ED Triage Vitals [11/17/23 2346]   Temp Heart Rate Resp BP   35.4 °C (95.7 °F) (!) 116 18 110/89      SpO2 Temp Source Heart Rate Source Patient Position   100 % " Tympanic Monitor Lying      BP Location FiO2 (%)     Left arm --       Physical Exam  Vitals and nursing note reviewed.   Constitutional:       General: He is in acute distress.      Appearance: He is well-developed. He is ill-appearing.   HENT:      Head: Normocephalic. Abrasion and contusion present.      Jaw: There is normal jaw occlusion.        Comments: Bruising and abrasions on the side of the left face  Eyes:      General: Gaze aligned appropriately.      Extraocular Movements: Extraocular movements intact.      Conjunctiva/sclera: Conjunctivae normal.      Pupils: Pupils are equal, round, and reactive to light.   Neck:     Cardiovascular:      Rate and Rhythm: Tachycardia present. Rhythm irregular.      Pulses: Normal pulses.      Heart sounds: Normal heart sounds, S1 normal and S2 normal. No murmur heard.  Pulmonary:      Effort: Pulmonary effort is normal. No tachypnea or respiratory distress.      Breath sounds: Normal breath sounds and air entry.   Chest:      Chest wall: Tenderness present. No mass, lacerations, deformity, swelling, crepitus or edema. There is no dullness to percussion.          Comments: Swelling and bruising along the midsternal area of the chest wall  Abdominal:      General: Abdomen is flat.      Palpations: Abdomen is soft.      Tenderness: There is generalized abdominal tenderness.          Comments: Scattered small bruising   Genitourinary:     Rectum: Guaiac result negative.   Musculoskeletal:         General: No swelling.      Right shoulder: Normal.      Left shoulder: Normal.      Right upper arm: Normal.      Left upper arm: Normal.      Right elbow: Normal.      Left elbow: Normal.      Right wrist: Normal.      Left wrist: Normal.      Right hand: Normal.      Left hand: Normal.      Cervical back: Full passive range of motion without pain and neck supple. Muscular tenderness present. No spinous process tenderness.      Thoracic back: Spasms and tenderness present.       Lumbar back: Spasms and tenderness present.   Skin:     General: Skin is warm and dry.      Capillary Refill: Capillary refill takes less than 2 seconds.      Findings: Abrasion and bruising present.   Neurological:      Mental Status: He is easily aroused. He is disoriented.      GCS: GCS eye subscore is 3. GCS verbal subscore is 4. GCS motor subscore is 5.      Motor: Weakness present.      Comments: Unable to fully assess due to patient's mental condition   Psychiatric:         Attention and Perception: He is inattentive.         Mood and Affect: Mood normal. Affect is flat.         ED Course & MDM   Diagnoses as of 11/18/23 0316   Fall, initial encounter   Altered mental status, unspecified altered mental status type   Abrasions of multiple sites   Atrial fibrillation, unspecified type (CMS/HCC)   Hypothermia, initial encounter   Black stool   Acute renal failure, unspecified acute renal failure type (CMS/HCC)   Hyperglycemia   Elevated CK   Elevated troponin     Labs Reviewed   CBC WITH AUTO DIFFERENTIAL - Abnormal       Result Value    WBC 15.0 (*)     nRBC 0.0      RBC 6.25 (*)     Hemoglobin 15.4      Hematocrit 50.6      MCV 81      MCH 24.6 (*)     MCHC 30.4 (*)     RDW 14.7 (*)     Platelets 440      Neutrophils % 90.8      Immature Granulocytes %, Automated 0.5      Lymphocytes % 3.1      Monocytes % 5.5      Eosinophils % 0.0      Basophils % 0.1      Neutrophils Absolute 13.59 (*)     Immature Granulocytes Absolute, Automated 0.08      Lymphocytes Absolute 0.46 (*)     Monocytes Absolute 0.82 (*)     Eosinophils Absolute 0.00      Basophils Absolute 0.02     MAGNESIUM - Abnormal    Magnesium 2.94 (*)    COMPREHENSIVE METABOLIC PANEL - Abnormal    Glucose 807 (*)     Sodium 138      Potassium 4.3      Chloride 99      Bicarbonate 16 (*)     Anion Gap 27 (*)     Urea Nitrogen 82 (*)     Creatinine 2.89 (*)     eGFR 22 (*)     Calcium 11.0 (*)     Albumin 4.3      Alkaline Phosphatase 138 (*)     Total  Protein 8.3 (*)     AST 24      Bilirubin, Total 0.5      ALT 18     LACTATE - Abnormal    Lactate 3.0 (*)     Narrative:     Venipuncture immediately after or during the administration of Metamizole may lead to falsely low results. Testing should be performed immediately  prior to Metamizole dosing.   TROPONIN I, HIGH SENSITIVITY - Abnormal    Troponin I, High Sensitivity 88 (*)     Narrative:     Less than 99th percentile of normal range cutoff-  Female and children under 18 years old <14 ng/L; Male <21 ng/L: Negative  Repeat testing should be performed if clinically indicated.     Female and children under 18 years old 14-50 ng/L; Male 21-50 ng/L:  Consistent with possible cardiac damage and possible increased clinical   risk. Serial measurements may help to assess extent of myocardial damage.     >50 ng/L: Consistent with cardiac damage, increased clinical risk and  myocardial infarction. Serial measurements may help assess extent of   myocardial damage.      NOTE: Children less than 1 year old may have higher baseline troponin   levels and results should be interpreted in conjunction with the overall   clinical context.     NOTE: Troponin I testing is performed using a different   testing methodology at [facility] than [facility]. Direct result comparisons should only   be made within the same method.   CREATINE KINASE - Abnormal    Creatine Kinase 1,241 (*)    LACTATE - Abnormal    Lactate 2.5 (*)     Narrative:     Venipuncture immediately after or during the administration of Metamizole may lead to falsely low results. Testing should be performed immediately  prior to Metamizole dosing.   TROPONIN I, HIGH SENSITIVITY - Abnormal    Troponin I, High Sensitivity 79 (*)     Narrative:     Less than 99th percentile of normal range cutoff-  Female and children under 18 years old <14 ng/L; Male <21 ng/L: Negative  Repeat testing should be performed if clinically indicated.     Female  and children under 18 years old 14-50 ng/L; Male 21-50 ng/L:  Consistent with possible cardiac damage and possible increased clinical   risk. Serial measurements may help to assess extent of myocardial damage.     >50 ng/L: Consistent with cardiac damage, increased clinical risk and  myocardial infarction. Serial measurements may help assess extent of   myocardial damage.      NOTE: Children less than 1 year old may have higher baseline troponin   levels and results should be interpreted in conjunction with the overall   clinical context.     NOTE: Troponin I testing is performed using a different   testing methodology at [facility] than at other   [facility]. Direct result comparisons should only   be made within the same method.   URINALYSIS WITH REFLEX MICROSCOPIC - Abnormal    Color, Urine Yellow      Appearance, Urine Clear      Specific Gravity, Urine 1.020      pH, Urine 5.0      Protein, Urine 100 (2+) (*)     Glucose, Urine >=500 (3+) (*)     Blood, Urine SMALL (1+) (*)     Ketones, Urine 20 (1+) (*)     Bilirubin, Urine NEGATIVE      Urobilinogen, Urine <2.0      Nitrite, Urine NEGATIVE      Leukocyte Esterase, Urine NEGATIVE     MICROSCOPIC ONLY, URINE - Abnormal    WBC, Urine NONE      RBC, Urine 1-2      Squamous Epithelial Cells, Urine 1-9 (SPARSE)      Bacteria, Urine NONE SEEN      Hyaline Casts, Urine 1+ (*)    COMPREHENSIVE METABOLIC PANEL - Abnormal    Glucose 683 (*)     Sodium 140      Potassium 3.8      Chloride 107      Bicarbonate 15 (*)     Anion Gap 22 (*)     Urea Nitrogen 83 (*)     Creatinine 2.68 (*)     eGFR 24 (*)     Calcium 9.7      Albumin 3.8      Alkaline Phosphatase 114      Total Protein 7.4      AST 26      Bilirubin, Total 0.4      ALT 16     CBC - Abnormal    WBC 15.4 (*)     nRBC 0.0      RBC 5.33      Hemoglobin 13.3 (*)     Hematocrit 43.2      MCV 81      MCH 25.0 (*)     MCHC 30.8 (*)     RDW 14.7 (*)     Platelets 331     POCT GLUCOSE - Abnormal     POCT Glucose >600 (*)    POCT GLUCOSE - Abnormal    POCT Glucose >600 (*)    OCCULT BLOOD X1, STOOL - Normal    Occult Blood, Stool X1 Negative     PROTIME-INR - Normal    Protime 11.5      INR 1.0     APTT - Normal    aPTT 33      Narrative:     The APTT is no longer used for monitoring Unfractionated Heparin Therapy. For monitoring Heparin Therapy, use the Heparin Assay.   AMMONIA - Normal    Ammonia 34     SARS-COV-2 PCR, SYMPTOMATIC - Normal    Coronavirus 2019, PCR Not Detected      Narrative:     This assay has received FDA Emergency Use Authorization (EUA) and is only authorized for the duration of time that circumstances exist to justify the authorization of the emergency use of in vitro diagnostic tests for the detection of SARS-CoV-2 virus and/or diagnosis of COVID-19 infection under section 564(b)(1) of the Act, 21 U.S.C. 360bbb-3(b)(1). This assay is an in vitro diagnostic nucleic acid amplification test for the qualitative detection of SARS-CoV-2 from nasopharyngeal specimens and has been validated for use at [facility]. Negative results do not preclude COVID-19 infections and should not be used as the sole basis for diagnosis, treatment, or other management decisions.     POCT GLUCOSE METER       CT head wo IV contrast   Final Result   CT HEAD:   1. No evidence of hemorrhage, depressed skull fracture, or other   acute intracranial trauma.   2. Disproportionate enlargement of the supratentorial ventricular   system relative to basal cisterns and sulci, nonspecific finding that   may represent asymmetric central volume loss or normal pressure   hydrocephalus.        CT FACIAL BONES:   1. Left periorbital and supraorbital soft tissue swelling, without   evidence of displaced orbital or facial bone fracture.   2. Large dental cavities and periapical lucencies are present in the   few remaining lower incisors. Correlate with dental exam.        CT C-SPINE:   1. No evidence of acute  trauma to the cervical spine.        MACRO:   None        Signed by: Porsche Read 11/18/2023 12:49 AM   Dictation workstation:   [id number]      CT cervical spine wo IV contrast   Final Result   CT HEAD:   1. No evidence of hemorrhage, depressed skull fracture, or other   acute intracranial trauma.   2. Disproportionate enlargement of the supratentorial ventricular   system relative to basal cisterns and sulci, nonspecific finding that   may represent asymmetric central volume loss or normal pressure   hydrocephalus.        CT FACIAL BONES:   1. Left periorbital and supraorbital soft tissue swelling, without   evidence of displaced orbital or facial bone fracture.   2. Large dental cavities and periapical lucencies are present in the   few remaining lower incisors. Correlate with dental exam.        CT C-SPINE:   1. No evidence of acute trauma to the cervical spine.        MACRO:   None        Signed by: Porsche Read 11/18/2023 12:49 AM   Dictation workstation:   [id number]      CT maxillofacial bones wo IV contrast   Final Result   CT HEAD:   1. No evidence of hemorrhage, depressed skull fracture, or other   acute intracranial trauma.   2. Disproportionate enlargement of the supratentorial ventricular   system relative to basal cisterns and sulci, nonspecific finding that   may represent asymmetric central volume loss or normal pressure   hydrocephalus.        CT FACIAL BONES:   1. Left periorbital and supraorbital soft tissue swelling, without   evidence of displaced orbital or facial bone fracture.   2. Large dental cavities and periapical lucencies are present in the   few remaining lower incisors. Correlate with dental exam.        CT C-SPINE:   1. No evidence of acute trauma to the cervical spine.        MACRO:   None        Signed by: Porsche Read 11/18/2023 12:49 AM   Dictation workstation:   [id number]      CT 3D reconstruction   Final Result   CT HEAD:   1. No evidence of  hemorrhage, depressed skull fracture, or other   acute intracranial trauma.   2. Disproportionate enlargement of the supratentorial ventricular   system relative to basal cisterns and sulci, nonspecific finding that   may represent asymmetric central volume loss or normal pressure   hydrocephalus.        CT FACIAL BONES:   1. Left periorbital and supraorbital soft tissue swelling, without   evidence of displaced orbital or facial bone fracture.   2. Large dental cavities and periapical lucencies are present in the   few remaining lower incisors. Correlate with dental exam.        CT C-SPINE:   1. No evidence of acute trauma to the cervical spine.        MACRO:   None        Signed by: Porsche Read 11/18/2023 12:49 AM   Dictation workstation:   [id number]      CT thoracic spine wo IV contrast   Final Result   1.  No evidence of acute trauma or high-grade stenosis in the   thoracic or lumbar spine.   2. Likely mild spinal canal narrowing is present at the levels of   L3-L4 and L4-L5 due to disc osteophyte complexes.        MACRO:   None        Signed by: Porsche Read 11/18/2023 1:14 AM   Dictation workstation:   [id number]      CT lumbar spine wo IV contrast   Final Result   1.  No evidence of acute trauma or high-grade stenosis in the   thoracic or lumbar spine.   2. Likely mild spinal canal narrowing is present at the levels of   L3-L4 and L4-L5 due to disc osteophyte complexes.        MACRO:   None        Signed by: Porsche Read 11/18/2023 1:14 AM   Dictation workstation:   [id number]      CT chest abdomen pelvis wo IV contrast   Final Result   1. No evidence of acute abnormality is present in the chest, abdomen   or pelvis. No evidence of acute trauma.   2. Moderate coronary artery calcifications, with pericardial   calcifications. Correlate with restrictive pericarditis.   3. Several 2-3 mm solid nodules are present in the upper lungs   bilaterally, likely benign by size criteria if  there is no history of   malignancy, although if patient is has risk factors for lung cancer,   yearly screening CT is warranted.   4. Scattered diverticula are present in the sigmoid colon without   evidence of acute diverticulitis.   5. Low-density lobulated lesions in the lower pole of the right   kidney are incompletely characterized on current exam, although cyst   cystically likely represent cysts. Slightly more hyperdense exophytic   lesion arising from the lower pole of the left kidney may represent a   proteinaceous or hemorrhagic cyst but is incompletely characterized   on study.   6. Additional findings as detailed above.             MACRO:   None        Signed by: Porsche Read 11/18/2023 1:05 AM   Dictation workstation:   [id number]          0259 -- NSR rate 141.  A-fib with RVR.  Normal intervals.  RBBB.  no findings of STEMI. changed compared to old EKG    CRITICAL CARE TIME    CRITICAL CARE :  The high probability of clinically significant deterioration in the patient’s condition required my highest level of medical decision making and my highest level of preparedness to intervene emergently.   I provided []1 to 30 minutes of critical care, not including other billable services/procedures.  I provided []31 to 75 minutes of critical care, not including other billable services/procedures.  I provided [x]75 to 120 minutes of critical care, not including other billable services/procedures.  I provided []>120  minutes of critical care, not including other billable services/procedures.  The patient was reevaluated/re-examined multiple times during the visit. Critical care time includes management at bedside, discussion with other providers and consultants, family counseling and answering questions, and documentation. Care involves decision making of high complexity to assess, manipulate, and support vital organ system failure and/or to prevent further life threatening deterioration of the  patient's condition. Failure to initiate these interventions on an urgent basis would likely result in sudden, clinically significant or life threatening deterioration in the patient's condition of metabolic encephalopathy, DKA, renal failure, A-fib      Medical Decision Making  Patient upon arrival to ED appeared to be distressed requiring immediate intervention and continued to be poor historian due to his mental status change.  Family at bedside at the reconfirm report that was provided earlier and discussed with them the presenting complaints and clinical findings.  Reviewed them patient's epic chart and counseled them on injury status post falls and appropriate regimental/treatments.  After the assessment and evaluation IV fluids started, labs sent, imaging ordered, EKG reviewed, patient placed under fall precautions, patient was found to be hypothermic and treatment was begun to rewarm the patient, POC glucose was performed, placed on cardiac monitor, and observed.  Patient was observed closely due to intermittent bouts of hypotension with persistent tachycardia and patient sugar was found to be elevated.  Patient was immediately started on DKA protocol and continued given significant IV fluid hydration due to concern of possible rhabdomyolysis and renal failure.  Patient's final results were reviewed throughout with her postings in the epic chart and symptomatically patient was treated accordingly with the abnormal findings found.  Extended discussion was had with on-call ICU attending at [facility] and treatment was continued aggressively in the ED.  At this time it was agreeable that patient required higher level of care and to be admitted/transferred to [facility] ICU.  Patient continued to have no change in mentation other than becoming a little bit more verbal than earlier but continued to AAO x1 only to self.  Findings and imaging were also reviewed with the ICU attending and patient Stable in the ED  as he had steady improvement from his initial arrival.  Patient transferred via helicopter to the ICU at [facility].    Amount and/or Complexity of Data Reviewed  External Data Reviewed: labs, radiology, ECG and notes.  Labs: ordered. Decision-making details documented in ED Course.  Radiology: ordered. Decision-making details documented in ED Course.  ECG/medicine tests: ordered and independent interpretation performed. Decision-making details documented in ED Course.        Procedure  Procedures     Duran Davis MD  11/18/23 9698

## 2023-11-18 NOTE — H&P
History Of Present Illness  Néstor Castro is a 73 y.o. malewith past medical history of HF with Improved EF, Atrial fibrillation on Eliquis, non obstructive coronary artery disease, aortic stenosis, sleep apnea admitted for DKA and Altered mental status. History was obtained from patient daughter as patient was disoriented on examination. Per daughter, she talked to him on Tuesday. At that time, patient was alert and oriented. Daughter called him on Wednesday and Thursday but he did not pick. Patient daughter went to his place on Friday and knock the door several times with no response. Patient was found with his face on the floor and in faeces and urine. Daughter states that patient was disoriented to place and time at that time.  He was able to recognize him and a couple of fire service department. She states that patient has falling multiple times. Last episode was last week. She has been trying to convince him to go nursing home as she think that patient has not been able to take care of himself. Patient daughter thinks that he does not take him medications.     ED Course:  CBC:  15/15.4/440  CMP: 138/4.3/99/1682/2.89 Anion Zafar: 27 Glucose: 807  Lactate: 3-2.5  Troponin: 88--79  Creatine Kinase: 1241  ECG: Atrial Fibrillation with RVR,   Urine Analysis: Proteinuria, Glucosuria, Hematuria, ketonuria  Imaging Results  CT of the Head/Facial Bone/Spine:  No evidence of hemorrhage, depressed skull fracture, or other cute intracranial trauma.  Disproportionate enlargement of the supratentorial ventricular   system relative to basal cisterns and sulci, nonspecific finding that   may represent asymmetric central volume loss or normal pressure   hydrocephalus. Left periorbital and supraorbital soft tissue swelling, without evidence of displaced orbital or facial bone fracture. Large dental cavities and periapical lucencies are present in the few remaining lower incisors. Correlate with dental exam.  No evidence  of acute trauma to the cervical spine.   Ct of the Chest/Abdomen/pelvis: . No evidence of acute abnormality is present in the chest, abdomen or pelvis. No evidence of acute trauma. 2. Moderate coronary artery calcifications, with pericardial calcifications. Correlate with restrictive pericarditis.3. Several 2-3 mm solid nodules are present in the upper lungs bilaterally, likely benign by size criteria if there is no history of malignancy, although if patient is has risk factors for lung cancer,yearly screening CT is warranted.4. Scattered diverticula are present in the sigmoid colon without evidence of acute diverticulitis.5. Low-density lobulated lesions in the lower pole of the right kidney are incompletely characterized on current exam, although cyst cystically likely represent cysts. Slightly more hyperdense exophytic lesion arising from the lower pole of the left kidney may represent a proteinaceous or hemorrhagic cyst but is incompletely characterized on study.    Intervention: 3 liters of NACL, heparin drip, Insulin drip at a rate of 2     Past Medical History  No past medical history on file.    Surgical History  No past surgical history on file.     Social History  He reports that he has quit smoking. His smoking use included cigarettes. He does not have any smokeless tobacco history on file. He reports that he does not currently use alcohol. He reports that he does not use drugs.    Family History  No family history on file.     Allergies  Patient has no known allergies.    Review of Systems   Constitutional: Negative.    HENT: Negative.     Respiratory: Negative.     Cardiovascular: Negative.    Gastrointestinal: Negative.    Genitourinary: Negative.    Musculoskeletal: Negative.    Neurological: Negative.    Hematological: Negative.    Psychiatric/Behavioral: Negative.          Physical Exam  Constitutional:       Appearance: He is obese.   HENT:      Head: Normocephalic.   Cardiovascular:      Rate and  "Rhythm: Tachycardia present. Rhythm irregular.   Abdominal:      General: Abdomen is flat. Bowel sounds are normal.      Comments: Bruises notice on the left side of the abdomen   Musculoskeletal:      Cervical back: Neck supple.      Right lower leg: Edema present.      Left lower leg: Edema present.   Skin:     General: Skin is warm and dry.   Neurological:      Mental Status: He is disoriented.   Psychiatric:         Behavior: Behavior normal.          Last Recorded Vitals  Blood pressure 122/53, pulse 103, temperature 35.6 °C (96.1 °F), temperature source Temporal, resp. rate 19, height 1.829 m (6' 0.01\"), weight 121 kg (266 lb 12.1 oz), SpO2 100 %.    Relevant Results  CT thoracic spine wo IV contrast    Result Date: 11/18/2023  Interpreted By:  Porsche Read, STUDY: CT THORACIC SPINE WO IV CONTRAST; CT LUMBAR SPINE WO IV CONTRAST; 11/18/2023 12:27 am   INDICATION: Signs/Symptoms:fall.   COMPARISON: Same day CT of the chest, abdomen and pelvis.   ACCESSION NUMBER(S): [id number]; [id number]   ORDERING CLINICIAN: ROWAN AVILA   TECHNIQUE: Axial CT images of the thoracic and lumbar spine are obtained. Axial, coronal and sagittal reconstructions are submitted for review.   FINDINGS: THORACIC SPINE:   Thoracic vertebral alignment is maintained, without significant spondylolisthesis.   Multilevel insufficiency endplate changes with small Schmorl's nodes are present in the thoracic spine along the inferior endplates of T6, T7, T8 and T9, without evidence of compression fractures.   Posterior elements of the thoracic spine do not demonstrate any evidence of acute trauma. Well corticated ossific fragments along the spinous processes are T3, T4 and T5 likely represent degenerative changes or sequela of remote injuries.   Mild intervertebral disc height loss is present in the mid and lower thoracic spine.   Although the exam is not optimized to assess the spinal canal, no high-grade spinal canal stenosis is " identified.   Paraspinal musculature is unremarkable in appearance.   LUMBAR SPINE:   There are 5 lumbar type non rib-bearing vertebral bodies, with lowest well-formed intervertebral disc space labeled L5-S1.   No compression fracture is identified in the lumbar spine, although multilevel insufficiency endplate changes are present, with Schmorl's nodes noted along the inferior endplates of L4 and L5 and superior endplates of L5 and S1.   Posterior elements of the lumbar spine do not demonstrate any evidence of acute trauma. No abnormal intra spinous distance widening or displaced transverse or spinous process fractures are identified.   Moderate intervertebral disc height loss is present at L5-S1.   Although the exam is not optimized to assess the spinal canal, no high-grade stenosis is identified, likely mild spinal canal narrowing is present at L3-L4 and L4-L5 due to disc osteophyte complexes and ligamentum flavum thickening.   Paraspinal musculature does not demonstrate any acute abnormalities.       1.  No evidence of acute trauma or high-grade stenosis in the thoracic or lumbar spine. 2. Likely mild spinal canal narrowing is present at the levels of L3-L4 and L4-L5 due to disc osteophyte complexes.   MACRO: None   Signed by: Porsche Read 11/18/2023 1:14 AM Dictation workstation:   [id number]    CT lumbar spine wo IV contrast    Result Date: 11/18/2023  Interpreted By:  Porsche Read, STUDY: CT THORACIC SPINE WO IV CONTRAST; CT LUMBAR SPINE WO IV CONTRAST; 11/18/2023 12:27 am   INDICATION: Signs/Symptoms:fall.   COMPARISON: Same day CT of the chest, abdomen and pelvis.   ACCESSION NUMBER(S): [id number]; [id number]   ORDERING CLINICIAN: ROWAN AVILA   TECHNIQUE: Axial CT images of the thoracic and lumbar spine are obtained. Axial, coronal and sagittal reconstructions are submitted for review.   FINDINGS: THORACIC SPINE:   Thoracic vertebral alignment is maintained, without significant  spondylolisthesis.   Multilevel insufficiency endplate changes with small Schmorl's nodes are present in the thoracic spine along the inferior endplates of T6, T7, T8 and T9, without evidence of compression fractures.   Posterior elements of the thoracic spine do not demonstrate any evidence of acute trauma. Well corticated ossific fragments along the spinous processes are T3, T4 and T5 likely represent degenerative changes or sequela of remote injuries.   Mild intervertebral disc height loss is present in the mid and lower thoracic spine.   Although the exam is not optimized to assess the spinal canal, no high-grade spinal canal stenosis is identified.   Paraspinal musculature is unremarkable in appearance.   LUMBAR SPINE:   There are 5 lumbar type non rib-bearing vertebral bodies, with lowest well-formed intervertebral disc space labeled L5-S1.   No compression fracture is identified in the lumbar spine, although multilevel insufficiency endplate changes are present, with Schmorl's nodes noted along the inferior endplates of L4 and L5 and superior endplates of L5 and S1.   Posterior elements of the lumbar spine do not demonstrate any evidence of acute trauma. No abnormal intra spinous distance widening or displaced transverse or spinous process fractures are identified.   Moderate intervertebral disc height loss is present at L5-S1.   Although the exam is not optimized to assess the spinal canal, no high-grade stenosis is identified, likely mild spinal canal narrowing is present at L3-L4 and L4-L5 due to disc osteophyte complexes and ligamentum flavum thickening.   Paraspinal musculature does not demonstrate any acute abnormalities.       1.  No evidence of acute trauma or high-grade stenosis in the thoracic or lumbar spine. 2. Likely mild spinal canal narrowing is present at the levels of L3-L4 and L4-L5 due to disc osteophyte complexes.   MACRO: None   Signed by: Porsche Read 11/18/2023 1:14 AM Dictation  workstation:   [id number]    CT chest abdomen pelvis wo IV contrast    Result Date: 11/18/2023  Interpreted By:  Porsche Read, STUDY: CT CHEST ABDOMEN PELVIS WO CONTRAST;  11/18/2023 12:27 am   INDICATION: Signs/Symptoms:pain.   COMPARISON: None.   ACCESSION NUMBER(S): [id number]   ORDERING CLINICIAN: ROWAN AVILA   TECHNIQUE: CT of the chest, abdomen and pelvis was performed. Contiguous axial images were obtained at 3 mm slice thickness through the chest, abdomen and pelvis. Coronal and sagittal reconstructions at 3 mm slice thickness were performed.  No intravenous or oral contrast agents were administered.   FINDINGS: Please note that the study is limited without intravenous contrast.   CHEST:     LUNG/PLEURA/LARGE AIRWAYS: Trachea and central airways are patent, without evidence of endobronchial lesion. There is a diverticulum arising from right mainstem bronchus projecting into the subcarinal region.   Within limitations of somewhat motion degraded exam, no pleural effusions, consolidation or pneumothorax is evident.   Several tiny 2-3 mm nodules are present in the lungs bilaterally. No suspicious lung masses are identified.   VESSELS: Aorta and pulmonary arteries are normal caliber.  Moderate vascular calcifications are present in the abdominal aorta. Moderate coronary artery calcifications are present.   HEART: Heart is normal in size with pericardial calcifications present. No pericardial effusion is noted.   MEDIASTINUM AND DUDLEY: No mediastinal, hilar or axillary lymph nodes are present.  The esophagus is unremarkable in appearance. There is no evidence of pneumomediastinum.   CHEST WALL AND LOWER NECK: Soft tissues of the chest wall do not demonstrate any acute abnormalities. No displaced rib fractures or other osseous injuries are identified.   ABDOMEN:   LIVER: Liver is normal in size and within limits of noncontrast exam does not demonstrate any acute abnormalities. No perihepatic free  fluid is evident.   BILE DUCTS: No intrahepatic or extrahepatic biliary dilatation is present.   GALLBLADDER: Gallbladder is unremarkable in appearance.   PANCREAS: No pancreatic ductal dilatation or peripancreatic stranding is evident.   SPLEEN: Spleen is unremarkable in appearance.   ADRENAL GLANDS: Adrenal glands are unremarkable.   KIDNEYS AND URETERS: Kidneys are symmetric in size without evidence of hydronephrosis bilaterally. Several lobulated low-density lesions arise from the right kidney, possibly representing cysts. No ureteral dilatation is evident.   PELVIS:   BLADDER: Bladder is unremarkable in appearance.   REPRODUCTIVE ORGANS: Prostate is enlarged.   BOWEL: Small bowel is nondilated. No inflammatory wall thickening or abnormal dilatation is present in the large bowel. Appendix is unremarkable in appearance in the right lower quadrant.   Scattered diverticula are present in the sigmoid colon without evidence of acute diverticulitis.   VESSELS: There is no aneurysmal dilatation of the abdominal aorta. The IVC is within normal limits. Extensive vascular calcifications are present in the abdominal aorta.   PERITONEUM/RETROPERITONEUM/LYMPH NODES: There is no evidence of free air, free fluid, or thick-walled collections in the abdomen and pelvis. No enlarged lymphadenopathy is evident.   ABDOMINAL WALL: The abdominal wall soft tissues appear normal.   BONES: Please refer to separately dictated CT of the thoracic and lumbar spine for further characterization of spinal findings.   No acute osseous abnormality is identified in the pelvis.       1. No evidence of acute abnormality is present in the chest, abdomen or pelvis. No evidence of acute trauma. 2. Moderate coronary artery calcifications, with pericardial calcifications. Correlate with restrictive pericarditis. 3. Several 2-3 mm solid nodules are present in the upper lungs bilaterally, likely benign by size criteria if there is no history of malignancy,  although if patient is has risk factors for lung cancer, yearly screening CT is warranted. 4. Scattered diverticula are present in the sigmoid colon without evidence of acute diverticulitis. 5. Low-density lobulated lesions in the lower pole of the right kidney are incompletely characterized on current exam, although cyst cystically likely represent cysts. Slightly more hyperdense exophytic lesion arising from the lower pole of the left kidney may represent a proteinaceous or hemorrhagic cyst but is incompletely characterized on study. 6. Additional findings as detailed above.     MACRO: None   Signed by: Porsche Read 11/18/2023 1:05 AM Dictation workstation:   [id number]    CT head wo IV contrast    Result Date: 11/18/2023  Interpreted By:  Porsche Read, STUDY: CT HEAD WO IV CONTRAST; CT CERVICAL SPINE WO IV CONTRAST; CT FACIAL BONES WO IV CONTRAST; CT 3D RECONSTRUCTION;  11/18/2023 12:28 am   INDICATION: Signs/Symptoms:altered mental state; Signs/Symptoms:pain; Signs/Symptoms:contusion; Signs/Symptoms:possible fall contusion.   COMPARISON: None.   ACCESSION NUMBER(S): [id number]; [id number]; [id number]; [id number]   ORDERING CLINICIAN: ROWAN AVILA   TECHNIQUE: Noncontrast axial CT scan of head was performed, with coronal and sagittal reformats provided. The images were reviewed in bone, brain, blood and soft tissue windows.   Thin cut axial CT images through the facial bones were obtained and reconstructed in the coronal and sagittal plane. 3D reconstruction was created on a dedicated workstation and provided for interpretation.   Axial CT images of the cervical spine are obtained. Axial, coronal and sagittal reconstructions are provided for review.   FINDINGS: CT HEAD:   No hyperdense intracranial hemorrhage is evident. There is no mass effect or midline shift.   Gray-white differentiation is intact, without evidence of CT apparent transcortical infarct.   There is somewhat  disproportionate enlargement of the supratentorial ventricular system relative to basal cisterns nonspecific finding that may represent asymmetric central volume loss or normal pressure hydrocephalus. No abnormal extra-axial fluid collections are identified. Basal cisterns are patent.   Scalp soft tissues do not demonstrate any acute abnormalities. Calvarium is unremarkable in appearance without evidence of depressed skull fracture. Mastoid air cells and middle ear cavities are well aerated without evidence of fluid fluid levels.   CT FACIAL BONES:   Mild left supraorbital and periorbital soft tissue swelling and edema is present, without evidence of associated fracture. Bony orbits are intact.   Intraorbital structures are symmetric in appearance without evidence of acute trauma.   Facial bones appear intact without evidence of displaced fracture. Nasal bones are unremarkable in appearance.   Visualized paranasal sinuses are well aerated without evidence of fluid fluid levels.   Soft tissues overlying the face and skull base do not demonstrate any acute abnormality.   There are numerous missing teeth, with dental cavities and periapical lucencies present in the remaining lower incisors. Temporomandibular joints are intact. No trauma to the oral cavity is identified.   Buccal soft tissues do not demonstrate any acute abnormalities. No fluid collections or soft tissue gas is identified. Parotid and submandibular glands are unremarkable in appearance.   CT C-SPINE:   There is straightening of normal lordotic curvature of the cervical spine, with a 1-2 mm anterolisthesis of C4 on C5.   No compression fractures are identified in the cervical spine, although mild insufficiency endplate changes with associated Schmorl's nodes are present along the inferior endplate of C6 and superior endplate of C7.   Posterior elements of the cervical spine do not demonstrate any evidence of acute trauma. No abnormal intra spinous  distance widening or displaced transverse or spinous process fractures are identified.   Craniocervical junction is intact. Facet joints are preserved with mild degenerate facet osteoarthropathy present at several levels, most pronounced at C3-C4 and C4-C5 on the left.   Mild intervertebral disc height loss is present at C4-C5 and C6-C7.   No high-grade spinal canal stenosis is identified. Mild spinal canal narrowing is possible at the level of C3-C4 due to mild disc bulge and ligamentum flavum thickening with slight effacement of the cervical spinal cord.   Mild neural foraminal narrowing is present at C4-C5 on the left due to endplate spurring and hypertrophic facet changes.   Prevertebral and paraspinal soft tissues do not demonstrate any acute abnormalities.       CT HEAD: 1. No evidence of hemorrhage, depressed skull fracture, or other acute intracranial trauma. 2. Disproportionate enlargement of the supratentorial ventricular system relative to basal cisterns and sulci, nonspecific finding that may represent asymmetric central volume loss or normal pressure hydrocephalus.   CT FACIAL BONES: 1. Left periorbital and supraorbital soft tissue swelling, without evidence of displaced orbital or facial bone fracture. 2. Large dental cavities and periapical lucencies are present in the few remaining lower incisors. Correlate with dental exam.   CT C-SPINE: 1. No evidence of acute trauma to the cervical spine.   MACRO: None   Signed by: Porsche Read 11/18/2023 12:49 AM Dictation workstation:   [id number]    CT cervical spine wo IV contrast    Result Date: 11/18/2023  Interpreted By:  Porsche Read, STUDY: CT HEAD WO IV CONTRAST; CT CERVICAL SPINE WO IV CONTRAST; CT FACIAL BONES WO IV CONTRAST; CT 3D RECONSTRUCTION;  11/18/2023 12:28 am   INDICATION: Signs/Symptoms:altered mental state; Signs/Symptoms:pain; Signs/Symptoms:contusion; Signs/Symptoms:possible fall contusion.   COMPARISON: None.    ACCESSION NUMBER(S): [id number]; [id number]; [id number]; [id number]   ORDERING CLINICIAN: ROWAN AVILA   TECHNIQUE: Noncontrast axial CT scan of head was performed, with coronal and sagittal reformats provided. The images were reviewed in bone, brain, blood and soft tissue windows.   Thin cut axial CT images through the facial bones were obtained and reconstructed in the coronal and sagittal plane. 3D reconstruction was created on a dedicated workstation and provided for interpretation.   Axial CT images of the cervical spine are obtained. Axial, coronal and sagittal reconstructions are provided for review.   FINDINGS: CT HEAD:   No hyperdense intracranial hemorrhage is evident. There is no mass effect or midline shift.   Gray-white differentiation is intact, without evidence of CT apparent transcortical infarct.   There is somewhat disproportionate enlargement of the supratentorial ventricular system relative to basal cisterns nonspecific finding that may represent asymmetric central volume loss or normal pressure hydrocephalus. No abnormal extra-axial fluid collections are identified. Basal cisterns are patent.   Scalp soft tissues do not demonstrate any acute abnormalities. Calvarium is unremarkable in appearance without evidence of depressed skull fracture. Mastoid air cells and middle ear cavities are well aerated without evidence of fluid fluid levels.   CT FACIAL BONES:   Mild left supraorbital and periorbital soft tissue swelling and edema is present, without evidence of associated fracture. Bony orbits are intact.   Intraorbital structures are symmetric in appearance without evidence of acute trauma.   Facial bones appear intact without evidence of displaced fracture. Nasal bones are unremarkable in appearance.   Visualized paranasal sinuses are well aerated without evidence of fluid fluid levels.   Soft tissues overlying the face and skull base do not demonstrate any acute abnormality.   There are  numerous missing teeth, with dental cavities and periapical lucencies present in the remaining lower incisors. Temporomandibular joints are intact. No trauma to the oral cavity is identified.   Buccal soft tissues do not demonstrate any acute abnormalities. No fluid collections or soft tissue gas is identified. Parotid and submandibular glands are unremarkable in appearance.   CT C-SPINE:   There is straightening of normal lordotic curvature of the cervical spine, with a 1-2 mm anterolisthesis of C4 on C5.   No compression fractures are identified in the cervical spine, although mild insufficiency endplate changes with associated Schmorl's nodes are present along the inferior endplate of C6 and superior endplate of C7.   Posterior elements of the cervical spine do not demonstrate any evidence of acute trauma. No abnormal intra spinous distance widening or displaced transverse or spinous process fractures are identified.   Craniocervical junction is intact. Facet joints are preserved with mild degenerate facet osteoarthropathy present at several levels, most pronounced at C3-C4 and C4-C5 on the left.   Mild intervertebral disc height loss is present at C4-C5 and C6-C7.   No high-grade spinal canal stenosis is identified. Mild spinal canal narrowing is possible at the level of C3-C4 due to mild disc bulge and ligamentum flavum thickening with slight effacement of the cervical spinal cord.   Mild neural foraminal narrowing is present at C4-C5 on the left due to endplate spurring and hypertrophic facet changes.   Prevertebral and paraspinal soft tissues do not demonstrate any acute abnormalities.       CT HEAD: 1. No evidence of hemorrhage, depressed skull fracture, or other acute intracranial trauma. 2. Disproportionate enlargement of the supratentorial ventricular system relative to basal cisterns and sulci, nonspecific finding that may represent asymmetric central volume loss or normal pressure hydrocephalus.   CT  FACIAL BONES: 1. Left periorbital and supraorbital soft tissue swelling, without evidence of displaced orbital or facial bone fracture. 2. Large dental cavities and periapical lucencies are present in the few remaining lower incisors. Correlate with dental exam.   CT C-SPINE: 1. No evidence of acute trauma to the cervical spine.   MACRO: None   Signed by: Porsche Read 11/18/2023 12:49 AM Dictation workstation:   [id number]    CT maxillofacial bones wo IV contrast    Result Date: 11/18/2023  Interpreted By:  Porsche Read, STUDY: CT HEAD WO IV CONTRAST; CT CERVICAL SPINE WO IV CONTRAST; CT FACIAL BONES WO IV CONTRAST; CT 3D RECONSTRUCTION;  11/18/2023 12:28 am   INDICATION: Signs/Symptoms:altered mental state; Signs/Symptoms:pain; Signs/Symptoms:contusion; Signs/Symptoms:possible fall contusion.   COMPARISON: None.   ACCESSION NUMBER(S): [id number]; [id number]; [id number]; [id number]   ORDERING CLINICIAN: ROWAN AVILA   TECHNIQUE: Noncontrast axial CT scan of head was performed, with coronal and sagittal reformats provided. The images were reviewed in bone, brain, blood and soft tissue windows.   Thin cut axial CT images through the facial bones were obtained and reconstructed in the coronal and sagittal plane. 3D reconstruction was created on a dedicated workstation and provided for interpretation.   Axial CT images of the cervical spine are obtained. Axial, coronal and sagittal reconstructions are provided for review.   FINDINGS: CT HEAD:   No hyperdense intracranial hemorrhage is evident. There is no mass effect or midline shift.   Gray-white differentiation is intact, without evidence of CT apparent transcortical infarct.   There is somewhat disproportionate enlargement of the supratentorial ventricular system relative to basal cisterns nonspecific finding that may represent asymmetric central volume loss or normal pressure hydrocephalus. No abnormal extra-axial fluid collections are  identified. Basal cisterns are patent.   Scalp soft tissues do not demonstrate any acute abnormalities. Calvarium is unremarkable in appearance without evidence of depressed skull fracture. Mastoid air cells and middle ear cavities are well aerated without evidence of fluid fluid levels.   CT FACIAL BONES:   Mild left supraorbital and periorbital soft tissue swelling and edema is present, without evidence of associated fracture. Bony orbits are intact.   Intraorbital structures are symmetric in appearance without evidence of acute trauma.   Facial bones appear intact without evidence of displaced fracture. Nasal bones are unremarkable in appearance.   Visualized paranasal sinuses are well aerated without evidence of fluid fluid levels.   Soft tissues overlying the face and skull base do not demonstrate any acute abnormality.   There are numerous missing teeth, with dental cavities and periapical lucencies present in the remaining lower incisors. Temporomandibular joints are intact. No trauma to the oral cavity is identified.   Buccal soft tissues do not demonstrate any acute abnormalities. No fluid collections or soft tissue gas is identified. Parotid and submandibular glands are unremarkable in appearance.   CT C-SPINE:   There is straightening of normal lordotic curvature of the cervical spine, with a 1-2 mm anterolisthesis of C4 on C5.   No compression fractures are identified in the cervical spine, although mild insufficiency endplate changes with associated Schmorl's nodes are present along the inferior endplate of C6 and superior endplate of C7.   Posterior elements of the cervical spine do not demonstrate any evidence of acute trauma. No abnormal intra spinous distance widening or displaced transverse or spinous process fractures are identified.   Craniocervical junction is intact. Facet joints are preserved with mild degenerate facet osteoarthropathy present at several levels, most pronounced at C3-C4 and  C4-C5 on the left.   Mild intervertebral disc height loss is present at C4-C5 and C6-C7.   No high-grade spinal canal stenosis is identified. Mild spinal canal narrowing is possible at the level of C3-C4 due to mild disc bulge and ligamentum flavum thickening with slight effacement of the cervical spinal cord.   Mild neural foraminal narrowing is present at C4-C5 on the left due to endplate spurring and hypertrophic facet changes.   Prevertebral and paraspinal soft tissues do not demonstrate any acute abnormalities.       CT HEAD: 1. No evidence of hemorrhage, depressed skull fracture, or other acute intracranial trauma. 2. Disproportionate enlargement of the supratentorial ventricular system relative to basal cisterns and sulci, nonspecific finding that may represent asymmetric central volume loss or normal pressure hydrocephalus.   CT FACIAL BONES: 1. Left periorbital and supraorbital soft tissue swelling, without evidence of displaced orbital or facial bone fracture. 2. Large dental cavities and periapical lucencies are present in the few remaining lower incisors. Correlate with dental exam.   CT C-SPINE: 1. No evidence of acute trauma to the cervical spine.   MACRO: None   Signed by: Porsche Read 11/18/2023 12:49 AM Dictation workstation:   [id number]    CT 3D reconstruction    Result Date: 11/18/2023  Interpreted By:  Porsche Read, STUDY: CT HEAD WO IV CONTRAST; CT CERVICAL SPINE WO IV CONTRAST; CT FACIAL BONES WO IV CONTRAST; CT 3D RECONSTRUCTION;  11/18/2023 12:28 am   INDICATION: Signs/Symptoms:altered mental state; Signs/Symptoms:pain; Signs/Symptoms:contusion; Signs/Symptoms:possible fall contusion.   COMPARISON: None.   ACCESSION NUMBER(S): [id number]; [id number]; [id number]; [id number]   ORDERING CLINICIAN: ROWAN AVILA   TECHNIQUE: Noncontrast axial CT scan of head was performed, with coronal and sagittal reformats provided. The images were reviewed in bone, brain, blood and  soft tissue windows.   Thin cut axial CT images through the facial bones were obtained and reconstructed in the coronal and sagittal plane. 3D reconstruction was created on a dedicated workstation and provided for interpretation.   Axial CT images of the cervical spine are obtained. Axial, coronal and sagittal reconstructions are provided for review.   FINDINGS: CT HEAD:   No hyperdense intracranial hemorrhage is evident. There is no mass effect or midline shift.   Gray-white differentiation is intact, without evidence of CT apparent transcortical infarct.   There is somewhat disproportionate enlargement of the supratentorial ventricular system relative to basal cisterns nonspecific finding that may represent asymmetric central volume loss or normal pressure hydrocephalus. No abnormal extra-axial fluid collections are identified. Basal cisterns are patent.   Scalp soft tissues do not demonstrate any acute abnormalities. Calvarium is unremarkable in appearance without evidence of depressed skull fracture. Mastoid air cells and middle ear cavities are well aerated without evidence of fluid fluid levels.   CT FACIAL BONES:   Mild left supraorbital and periorbital soft tissue swelling and edema is present, without evidence of associated fracture. Bony orbits are intact.   Intraorbital structures are symmetric in appearance without evidence of acute trauma.   Facial bones appear intact without evidence of displaced fracture. Nasal bones are unremarkable in appearance.   Visualized paranasal sinuses are well aerated without evidence of fluid fluid levels.   Soft tissues overlying the face and skull base do not demonstrate any acute abnormality.   There are numerous missing teeth, with dental cavities and periapical lucencies present in the remaining lower incisors. Temporomandibular joints are intact. No trauma to the oral cavity is identified.   Buccal soft tissues do not demonstrate any acute abnormalities. No fluid  collections or soft tissue gas is identified. Parotid and submandibular glands are unremarkable in appearance.   CT C-SPINE:   There is straightening of normal lordotic curvature of the cervical spine, with a 1-2 mm anterolisthesis of C4 on C5.   No compression fractures are identified in the cervical spine, although mild insufficiency endplate changes with associated Schmorl's nodes are present along the inferior endplate of C6 and superior endplate of C7.   Posterior elements of the cervical spine do not demonstrate any evidence of acute trauma. No abnormal intra spinous distance widening or displaced transverse or spinous process fractures are identified.   Craniocervical junction is intact. Facet joints are preserved with mild degenerate facet osteoarthropathy present at several levels, most pronounced at C3-C4 and C4-C5 on the left.   Mild intervertebral disc height loss is present at C4-C5 and C6-C7.   No high-grade spinal canal stenosis is identified. Mild spinal canal narrowing is possible at the level of C3-C4 due to mild disc bulge and ligamentum flavum thickening with slight effacement of the cervical spinal cord.   Mild neural foraminal narrowing is present at C4-C5 on the left due to endplate spurring and hypertrophic facet changes.   Prevertebral and paraspinal soft tissues do not demonstrate any acute abnormalities.       CT HEAD: 1. No evidence of hemorrhage, depressed skull fracture, or other acute intracranial trauma. 2. Disproportionate enlargement of the supratentorial ventricular system relative to basal cisterns and sulci, nonspecific finding that may represent asymmetric central volume loss or normal pressure hydrocephalus.   CT FACIAL BONES: 1. Left periorbital and supraorbital soft tissue swelling, without evidence of displaced orbital or facial bone fracture. 2. Large dental cavities and periapical lucencies are present in the few remaining lower incisors. Correlate with dental exam.   CT  C-SPINE: 1. No evidence of acute trauma to the cervical spine.   MACRO: None   Signed by: Porsche Read 11/18/2023 12:49 AM Dictation workstation:   [id number]              Assessment/Plan   Principal Problem:    DKA, type 2, not at goal (CMS/[facility])    Néstor Castro is a 73 y.o. malewith past medical history of HF with Improved EF, Atrial fibrillation on Eliquis, non obstructive coronary artery disease, aortic stenosis, sleep apnea admitted for DKA and Altered mental status    Neurology  #Acute Encephalopathy  - Likely metabolic in nature in the setting of DKA  - Treat underlying cause    Pulmonology   #Lung Nodules  - Outpatient Ct in 12 months    Cardiology  #Heart Failure with Improved EF  - Last echocardiogram on 05/2019 showed EF of 53   -Hold Lasix, lisinopril and Jardiance    #Atrial Fibrillation with Rapid Ventricular Response  - Continue Metoprolol 25 twice daily and Flecainide 100 twice daily  - Hold Anticoagulant due to Multiple bruises after fall and possible hemorrhagic renal cyst on CT     #HLD  #Non Obstructive CAD  -Continue aspirin and atorvastatin    Gastroenterology   No acute issues    Renal   #Acute Kidney Injury   -Pre renal in nature in nature   -Baseline Creatine< 1    # proteinaceous or hemorrhagic cyst  -Monitor for now  - Consider Urology consult if worsening hematuria noticed    #Rhabdomyolysis  -likely due to fall  - On maintenance fluid nacl 0.45 250ml/hr    Endocrinology  #Diabetic Ketoacidosis  #Uncontrolled Type 2 DM  -Patient last A1C was 11.7 on 03/06  -Home regimen: Lantus 50 unit twice daily, humalog 10 unit before meal, and empagliflozin 10 daily   Plan  -On Insulin Drip at a rate of 6unit  -Maintenance fluid NACL 0.45 250 ML/HR  - RFP and Mg Every 4 hours  -A1c and Beta-hydroxybutyrate pending  -DKA protocol in place  - Hold Empagliflozin    Hematology   -No acute issues    Infectious Disease  #Leukocytosis   -Likely reactive in the setting of DKA      Musculoskeletal  #Fall  #Multiple Bruises  - History of multiple falls  -PT/OT consulted  - Monitor bruises for now        Access: piv  Diet: NPO  GI Prophylaxis: none  DVT: SCD  Antibiotics: None  Disposition: Patient admitted for DKA and acute encephalopathy > 2 days           Shaggy Medina MD

## 2023-11-18 NOTE — PROGRESS NOTES
Physical Therapy                 Therapy Communication Note    Patient Name: Néstor Castro  MRN: [id number]  Today's Date: 11/18/2023     Discipline: Physical Therapy    Missed Visit Reason: Patient placed on medical hold. PT on hold this date per RN; pt is non-responsive, unable to answer questions and unable to participate at appropriate level. No PT eval completed.     Missed Time: Attempt

## 2023-11-18 NOTE — CARE PLAN
The patient's goals for the shift include      The clinical goals for the shift include TERRANCE    Over the shift, the patient did not make progress toward the following goals. Barriers to progression include. Recommendations to address these barriers include.

## 2023-11-19 NOTE — PROGRESS NOTES
Occupational Therapy                 Therapy Communication Note    Patient Name: Néstor Castro  MRN: [id number]  Today's Date: 11/19/2023     Discipline: Occupational Therapy    Missed Visit Reason: Missed Visit Reason: Patient placed on medical hold. Communicated with nsg, pt remains not appropriate for therapy eval. Decreased alertness and not able to actively participate.     Missed Time: Attempt 1008

## 2023-11-19 NOTE — HOSPITAL COURSE
Néstor Castro is a 73 y.o. malewith past medical history of HF with Improved EF, Atrial fibrillation on Eliquis, non obstructive coronary artery disease, aortic stenosis, sleep apnea admitted for DKA and Altered mental status. Patient was admitted for the management of DKA and started on IVF initially at 250cc/hr 1/2NS. After about 16 hours on the fluid, Gap closed, however there was difficulty transitioning patient to oral as he remained very lethargic and drowsy post gap closure. Fluid was however transitioned to D51/2NS and patient was continued on IVF at 200cc with reduction in the dose of insulin to 3units per hour.     11/20: Repeat of Head CT negative for intracranial hemorrhage or mass effect. DKA protocol discontinued. Initiated intensive insulin order for continued hyperglycemia. Patient transitioned to D5W @ 100cc/hr and 1L bolus LR for hypernatremia (153 mEq corrected). Discontinued IV metoprolol 5mg q6h. Gave 21mmol Kphos for hypophosphatemia (1.9). Deescalated abx to cefazolin 1g IV for coverage of cellulitis.     11/21: Sodium this morning was 147 (down from 154 on 11/20 at 1618). 2L LR boluses given: one at 0415 and another at 1100. Maintenance fluids discontinued at 0048. Metoprolol tartrate 25mg bid and flecainide 100mg bid restarted due to HR fluctuation overnight. His heparin gtt was discontinued and eliquis 5mg bid restarted due to improvement of CARMELO. His diet was advanced to a nectar thick diet with carb restriction of 60g/meal. Changed insulin regimen to 50 units lantus bid, lispro 10 units tid with meals, and high SSI (#4). General surgery does not recommend intervention regarding his abdominal wound at this time. Request transfer to [address] [place] [address].    11/22: Sodium this morning was 148. Gave 1L LR bolus and initiated D5W @ 50cc/hr. Will recheck in the morning. Repleted potassium and phosphate with 20mEq K and 15mmol kphos. Strength seems to be gradually improving. A&O x2 (to person and time).      11/23: Patient was A&Ox1. Was lethargic. He was noted to have oral thrush and a cough and was started on nystatin.     11/24: In the morning, patient was slightly more responsive than yesterday; oral thrush still present, cough improved. In the afternoon, he had an oxygen saturation dropping into the 70s and a fever of 38.2. He was placed on a NRB mask and suctioned, after which he was back up to 98%. A chest xray was obtained, as was a venous blood gas. Chest physiotherapy was ordered due to suspected mucus plugging. An MRI brain was also obtained due to droopy face and motor changes reported by the patient's daughter to rule out stroke vs. CO2 retention. PRN Tylenol was recommended to be given later today once the patient is able to tolerate PO intake. Palliative Care was also consulted.

## 2023-11-19 NOTE — PROGRESS NOTES
Physical Therapy                 Therapy Communication Note    Patient Name: Néstor Castro  MRN: [id number]  Today's Date: 11/19/2023     Discipline: Physical Therapy    Missed Visit Reason: Missed Visit Reason: Patient placed on medical hold (Per communication with OTR who spoke with Pt's RN Pt is not medically ready to work with therapy.)    Missed Time: Attempt    Comment:

## 2023-11-19 NOTE — PROGRESS NOTES
Néstor Castro is a 73 y.o. male on day 1 of admission presenting with DKA, type 2, not at goal (CMS/HCC).      Subjective   Patient seen and evaluated today. He appears very drowsy but is rousable noxious stimuli, although still not communicating     Objective     Last Recorded Vitals  /60   Pulse (!) 123   Temp 36.8 °C (98.2 °F)   Resp (!) 32   Wt 123 kg (271 lb 2.7 oz)   SpO2 92%   Intake/Output last 3 Shifts:    Intake/Output Summary (Last 24 hours) at 11/19/2023 1256  Last data filed at 11/19/2023 0525  Gross per 24 hour   Intake 5047.83 ml   Output 1600 ml   Net 3447.83 ml       Admission Weight  Weight: 121 kg (266 lb 12.1 oz) (11/18/23 0553)    Daily Weight  11/19/23 : 123 kg (271 lb 2.7 oz)    Image Results  US renal complete  Narrative: Interpreted By:  Daria Mckinney,   STUDY:  US RENAL COMPLETE; 11/18/2023 1:55 pm      INDICATION:  Concern for hemorrhagic renal cyst.      COMPARISON:  CT chest, abdomen and pelvis 11/17/2023      ACCESSION NUMBER(S):  [id number]      ORDERING CLINICIAN:  CRYSTAL JULIAN      TECHNIQUE:  Sonography of the kidneys and urinary bladder was performed.      FINDINGS:  Limited examination due to portable technique and patient's  immobility.      Right Kidney:  *Renal length: 10.2 cm  *Parenchyma: Normal parenchymal echogenicity. Normal parenchymal  thickness. *Collecting system: No hydronephrosis.  *Calculus: No echogenic, shadowing calculus.  *Lesion: Previously shown renal cysts on the prior CT not visualized.          Left Kidney:  *Renal length: 10.6 cm  *Parenchyma: Normal parenchymal echogenicity. Normal parenchymal  thickness. *Collecting system: No hydronephrosis.  *Calculus: No echogenic, shadowing calculus.  *Lesion: Previously shown renal cyst on the prior CT not visualized.      Bladder:  Not visualized.      Impression: Limited examination due to portable technique and patient's  immobility. Previously shown renal cysts on the prior CT not  visualized.       No hydronephrosis.      MACRO:  None      Signed by: Daria Mckinney 11/18/2023 2:18 PM  Dictation workstation:   [id number]  CT thoracic spine wo IV contrast, CT lumbar spine wo IV contrast  Narrative: Interpreted By:  Porsche Read,   STUDY:  CT THORACIC SPINE WO IV CONTRAST; CT LUMBAR SPINE WO IV CONTRAST;  11/18/2023 12:27 am      INDICATION:  Signs/Symptoms:fall.      COMPARISON:  Same day CT of the chest, abdomen and pelvis.      ACCESSION NUMBER(S):  [id number]; [id number]      ORDERING CLINICIAN:  ROWAN AVILA      TECHNIQUE:  Axial CT images of the thoracic and lumbar spine are obtained. Axial,  coronal and sagittal reconstructions are submitted for review.      FINDINGS:  THORACIC SPINE:      Thoracic vertebral alignment is maintained, without significant  spondylolisthesis.      Multilevel insufficiency endplate changes with small Schmorl's nodes  are present in the thoracic spine along the inferior endplates of T6,  T7, T8 and T9, without evidence of compression fractures.      Posterior elements of the thoracic spine do not demonstrate any  evidence of acute trauma. Well corticated ossific fragments along the  spinous processes are T3, T4 and T5 likely represent degenerative  changes or sequela of remote injuries.      Mild intervertebral disc height loss is present in the mid and lower  thoracic spine.      Although the exam is not optimized to assess the spinal canal, no  high-grade spinal canal stenosis is identified.      Paraspinal musculature is unremarkable in appearance.      LUMBAR SPINE:      There are 5 lumbar type non rib-bearing vertebral bodies, with lowest  well-formed intervertebral disc space labeled L5-S1.      No compression fracture is identified in the lumbar spine, although  multilevel insufficiency endplate changes are present, with Schmorl's  nodes noted along the inferior endplates of L4 and L5 and superior  endplates of L5 and S1.      Posterior elements of the  lumbar spine do not demonstrate any  evidence of acute trauma. No abnormal intra spinous distance widening  or displaced transverse or spinous process fractures are identified.      Moderate intervertebral disc height loss is present at L5-S1.      Although the exam is not optimized to assess the spinal canal, no  high-grade stenosis is identified, likely mild spinal canal narrowing  is present at L3-L4 and L4-L5 due to disc osteophyte complexes and  ligamentum flavum thickening.      Paraspinal musculature does not demonstrate any acute abnormalities.      Impression: 1.  No evidence of acute trauma or high-grade stenosis in the  thoracic or lumbar spine.  2. Likely mild spinal canal narrowing is present at the levels of  L3-L4 and L4-L5 due to disc osteophyte complexes.      MACRO:  None      Signed by: Porsche Read 11/18/2023 1:14 AM  Dictation workstation:   [id number]  CT chest abdomen pelvis wo IV contrast  Narrative: Interpreted By:  Porsche Read,   STUDY:  CT CHEST ABDOMEN PELVIS WO CONTRAST;  11/18/2023 12:27 am      INDICATION:  Signs/Symptoms:pain.      COMPARISON:  None.      ACCESSION NUMBER(S):  [id number]      ORDERING CLINICIAN:  ROWAN AVILA      TECHNIQUE:  CT of the chest, abdomen and pelvis was performed. Contiguous axial  images were obtained at 3 mm slice thickness through the chest,  abdomen and pelvis. Coronal and sagittal reconstructions at 3 mm  slice thickness were performed.  No intravenous or oral contrast  agents were administered.      FINDINGS:  Please note that the study is limited without intravenous contrast.      CHEST:          LUNG/PLEURA/LARGE AIRWAYS:  Trachea and central airways are patent, without evidence of  endobronchial lesion. There is a diverticulum arising from right  mainstem bronchus projecting into the subcarinal region.      Within limitations of somewhat motion degraded exam, no pleural  effusions, consolidation or pneumothorax is evident.       Several tiny 2-3 mm nodules are present in the lungs bilaterally. No  suspicious lung masses are identified.      VESSELS:  Aorta and pulmonary arteries are normal caliber.  Moderate vascular  calcifications are present in the abdominal aorta. Moderate coronary  artery calcifications are present.      HEART:  Heart is normal in size with pericardial calcifications present. No  pericardial effusion is noted.      MEDIASTINUM AND DUDLEY:  No mediastinal, hilar or axillary lymph nodes are present.  The  esophagus is unremarkable in appearance. There is no evidence of  pneumomediastinum.      CHEST WALL AND LOWER NECK:  Soft tissues of the chest wall do not demonstrate any acute  abnormalities. No displaced rib fractures or other osseous injuries  are identified.      ABDOMEN:      LIVER:  Liver is normal in size and within limits of noncontrast exam does  not demonstrate any acute abnormalities. No perihepatic free fluid is  evident.      BILE DUCTS:  No intrahepatic or extrahepatic biliary dilatation is present.      GALLBLADDER:  Gallbladder is unremarkable in appearance.      PANCREAS:  No pancreatic ductal dilatation or peripancreatic stranding is  evident.      SPLEEN:  Spleen is unremarkable in appearance.      ADRENAL GLANDS:  Adrenal glands are unremarkable.      KIDNEYS AND URETERS:  Kidneys are symmetric in size without evidence of hydronephrosis  bilaterally. Several lobulated low-density lesions arise from the  right kidney, possibly representing cysts. No ureteral dilatation is  evident.      PELVIS:      BLADDER:  Bladder is unremarkable in appearance.      REPRODUCTIVE ORGANS:  Prostate is enlarged.      BOWEL:  Small bowel is nondilated. No inflammatory wall thickening or  abnormal dilatation is present in the large bowel. Appendix is  unremarkable in appearance in the right lower quadrant.      Scattered diverticula are present in the sigmoid colon without  evidence of acute diverticulitis.       VESSELS:  There is no aneurysmal dilatation of the abdominal aorta. The IVC is  within normal limits. Extensive vascular calcifications are present  in the abdominal aorta.      PERITONEUM/RETROPERITONEUM/LYMPH NODES:  There is no evidence of free air, free fluid, or thick-walled  collections in the abdomen and pelvis. No enlarged lymphadenopathy is  evident.      ABDOMINAL WALL:  The abdominal wall soft tissues appear normal.      BONES:  Please refer to separately dictated CT of the thoracic and lumbar  spine for further characterization of spinal findings.      No acute osseous abnormality is identified in the pelvis.      Impression: 1. No evidence of acute abnormality is present in the chest, abdomen  or pelvis. No evidence of acute trauma.  2. Moderate coronary artery calcifications, with pericardial  calcifications. Correlate with restrictive pericarditis.  3. Several 2-3 mm solid nodules are present in the upper lungs  bilaterally, likely benign by size criteria if there is no history of  malignancy, although if patient is has risk factors for lung cancer,  yearly screening CT is warranted.  4. Scattered diverticula are present in the sigmoid colon without  evidence of acute diverticulitis.  5. Low-density lobulated lesions in the lower pole of the right  kidney are incompletely characterized on current exam, although cyst  cystically likely represent cysts. Slightly more hyperdense exophytic  lesion arising from the lower pole of the left kidney may represent a  proteinaceous or hemorrhagic cyst but is incompletely characterized  on study.  6. Additional findings as detailed above.          MACRO:  None      Signed by: Porsche Read 11/18/2023 1:05 AM  Dictation workstation:   [id number]  CT head wo IV contrast, CT cervical spine wo IV contrast, CT maxillofacial bones wo IV contrast, CT 3D reconstruction  Narrative: Interpreted By:  Porsche Read,   STUDY:  CT HEAD WO IV CONTRAST; CT  CERVICAL SPINE WO IV CONTRAST; CT FACIAL  BONES WO IV CONTRAST; CT 3D RECONSTRUCTION;  11/18/2023 12:28 am      INDICATION:  Signs/Symptoms:altered mental state; Signs/Symptoms:pain;  Signs/Symptoms:contusion; Signs/Symptoms:possible fall contusion.      COMPARISON:  None.      ACCESSION NUMBER(S):  [id number]; [id number]; [id number]; [id number]      ORDERING CLINICIAN:  ROWAN AVILA      TECHNIQUE:  Noncontrast axial CT scan of head was performed, with coronal and  sagittal reformats provided. The images were reviewed in bone, brain,  blood and soft tissue windows.      Thin cut axial CT images through the facial bones were obtained and  reconstructed in the coronal and sagittal plane. 3D reconstruction  was created on a dedicated workstation and provided for  interpretation.      Axial CT images of the cervical spine are obtained. Axial, coronal  and sagittal reconstructions are provided for review.      FINDINGS:  CT HEAD:      No hyperdense intracranial hemorrhage is evident. There is no mass  effect or midline shift.      Gray-white differentiation is intact, without evidence of CT apparent  transcortical infarct.      There is somewhat disproportionate enlargement of the supratentorial  ventricular system relative to basal cisterns nonspecific finding  that may represent asymmetric central volume loss or normal pressure  hydrocephalus. No abnormal extra-axial fluid collections are  identified. Basal cisterns are patent.      Scalp soft tissues do not demonstrate any acute abnormalities.  Calvarium is unremarkable in appearance without evidence of depressed  skull fracture. Mastoid air cells and middle ear cavities are well  aerated without evidence of fluid fluid levels.      CT FACIAL BONES:      Mild left supraorbital and periorbital soft tissue swelling and edema  is present, without evidence of associated fracture. Bony orbits are  intact.      Intraorbital structures are symmetric in appearance  without evidence  of acute trauma.      Facial bones appear intact without evidence of displaced fracture.  Nasal bones are unremarkable in appearance.      Visualized paranasal sinuses are well aerated without evidence of  fluid fluid levels.      Soft tissues overlying the face and skull base do not demonstrate any  acute abnormality.      There are numerous missing teeth, with dental cavities and periapical  lucencies present in the remaining lower incisors. Temporomandibular  joints are intact. No trauma to the oral cavity is identified.      Buccal soft tissues do not demonstrate any acute abnormalities. No  fluid collections or soft tissue gas is identified. Parotid and  submandibular glands are unremarkable in appearance.      CT C-SPINE:      There is straightening of normal lordotic curvature of the cervical  spine, with a 1-2 mm anterolisthesis of C4 on C5.      No compression fractures are identified in the cervical spine,  although mild insufficiency endplate changes with associated  Schmorl's nodes are present along the inferior endplate of C6 and  superior endplate of C7.      Posterior elements of the cervical spine do not demonstrate any  evidence of acute trauma. No abnormal intra spinous distance widening  or displaced transverse or spinous process fractures are identified.      Craniocervical junction is intact. Facet joints are preserved with  mild degenerate facet osteoarthropathy present at several levels,  most pronounced at C3-C4 and C4-C5 on the left.      Mild intervertebral disc height loss is present at C4-C5 and C6-C7.      No high-grade spinal canal stenosis is identified. Mild spinal canal  narrowing is possible at the level of C3-C4 due to mild disc bulge  and ligamentum flavum thickening with slight effacement of the  cervical spinal cord.      Mild neural foraminal narrowing is present at C4-C5 on the left due  to endplate spurring and hypertrophic facet changes.      Prevertebral  and paraspinal soft tissues do not demonstrate any acute  abnormalities.      Impression: CT HEAD:  1. No evidence of hemorrhage, depressed skull fracture, or other  acute intracranial trauma.  2. Disproportionate enlargement of the supratentorial ventricular  system relative to basal cisterns and sulci, nonspecific finding that  may represent asymmetric central volume loss or normal pressure  hydrocephalus.      CT FACIAL BONES:  1. Left periorbital and supraorbital soft tissue swelling, without  evidence of displaced orbital or facial bone fracture.  2. Large dental cavities and periapical lucencies are present in the  few remaining lower incisors. Correlate with dental exam.      CT C-SPINE:  1. No evidence of acute trauma to the cervical spine.      MACRO:  None      Signed by: Porsche Read 11/18/2023 12:49 AM  Dictation workstation:   [id number]      Physical Exam  Constitutional:       Appearance: Normal appearance.   Cardiovascular:      Rate and Rhythm: Tachycardia present. Rhythm irregular.      Pulses: Normal pulses.      Heart sounds: Normal heart sounds.   Pulmonary:      Effort: Pulmonary effort is normal.      Breath sounds: Normal breath sounds.   Abdominal:      Palpations: Abdomen is soft.   Musculoskeletal:      Left lower leg: Edema (left lower extremity is warm and slightly edematous) present.   Skin:     General: Skin is dry.   Neurological:      Mental Status: He is alert.         Relevant Results  Results for orders placed or performed during the hospital encounter of 11/18/23 (from the past 24 hour(s))   POCT GLUCOSE   Result Value Ref Range    POCT Glucose 226 (H) 74 - 99 mg/dL   POCT GLUCOSE   Result Value Ref Range    POCT Glucose 218 (H) 74 - 99 mg/dL   POCT GLUCOSE   Result Value Ref Range    POCT Glucose 196 (H) 74 - 99 mg/dL   Renal function panel   Result Value Ref Range    Glucose 227 (H) 74 - 99 mg/dL    Sodium 150 (H) 136 - 145 mmol/L    Potassium 4.1 3.5 - 5.3 mmol/L     Chloride 116 (H) 98 - 107 mmol/L    Bicarbonate 19 (L) 21 - 32 mmol/L    Anion Gap 19 10 - 20 mmol/L    Urea Nitrogen 84 (H) 6 - 23 mg/dL    Creatinine 2.27 (H) 0.50 - 1.30 mg/dL    eGFR 30 (L) >60 mL/min/1.73m*2    Calcium 10.2 8.6 - 10.3 mg/dL    Phosphorus 3.8 2.5 - 4.9 mg/dL    Albumin 3.5 3.4 - 5.0 g/dL   POCT GLUCOSE   Result Value Ref Range    POCT Glucose 217 (H) 74 - 99 mg/dL   POCT GLUCOSE   Result Value Ref Range    POCT Glucose 230 (H) 74 - 99 mg/dL   POCT GLUCOSE   Result Value Ref Range    POCT Glucose 243 (H) 74 - 99 mg/dL   POCT GLUCOSE   Result Value Ref Range    POCT Glucose 241 (H) 74 - 99 mg/dL   POCT GLUCOSE   Result Value Ref Range    POCT Glucose 219 (H) 74 - 99 mg/dL   Renal function panel   Result Value Ref Range    Glucose 248 (H) 74 - 99 mg/dL    Sodium 146 (H) 136 - 145 mmol/L    Potassium 4.1 3.5 - 5.3 mmol/L    Chloride 119 (H) 98 - 107 mmol/L    Bicarbonate 19 (L) 21 - 32 mmol/L    Anion Gap 12 10 - 20 mmol/L    Urea Nitrogen 84 (H) 6 - 23 mg/dL    Creatinine 2.16 (H) 0.50 - 1.30 mg/dL    eGFR 32 (L) >60 mL/min/1.73m*2    Calcium 9.7 8.6 - 10.3 mg/dL    Phosphorus 3.4 2.5 - 4.9 mg/dL    Albumin 3.3 (L) 3.4 - 5.0 g/dL   POCT GLUCOSE   Result Value Ref Range    POCT Glucose 237 (H) 74 - 99 mg/dL   POCT GLUCOSE   Result Value Ref Range    POCT Glucose 250 (H) 74 - 99 mg/dL   POCT GLUCOSE   Result Value Ref Range    POCT Glucose 249 (H) 74 - 99 mg/dL   POCT GLUCOSE   Result Value Ref Range    POCT Glucose 239 (H) 74 - 99 mg/dL   POCT GLUCOSE   Result Value Ref Range    POCT Glucose 231 (H) 74 - 99 mg/dL   POCT GLUCOSE   Result Value Ref Range    POCT Glucose 250 (H) 74 - 99 mg/dL   POCT GLUCOSE   Result Value Ref Range    POCT Glucose 214 (H) 74 - 99 mg/dL   POCT GLUCOSE   Result Value Ref Range    POCT Glucose 234 (H) 74 - 99 mg/dL   POCT GLUCOSE   Result Value Ref Range    POCT Glucose 224 (H) 74 - 99 mg/dL   Renal Function Panel   Result Value Ref Range    Glucose 240 (H) 74 - 99 mg/dL     Sodium 147 (H) 136 - 145 mmol/L    Potassium 4.0 3.5 - 5.3 mmol/L    Chloride 119 (H) 98 - 107 mmol/L    Bicarbonate 20 (L) 21 - 32 mmol/L    Anion Gap 12 10 - 20 mmol/L    Urea Nitrogen 73 (H) 6 - 23 mg/dL    Creatinine 1.75 (H) 0.50 - 1.30 mg/dL    eGFR 41 (L) >60 mL/min/1.73m*2    Calcium 9.4 8.6 - 10.3 mg/dL    Phosphorus 2.7 2.5 - 4.9 mg/dL    Albumin 3.3 (L) 3.4 - 5.0 g/dL   CBC   Result Value Ref Range    WBC 10.4 4.4 - 11.3 x10*3/uL    nRBC 0.0 0.0 - 0.0 /100 WBCs    RBC 5.07 4.50 - 5.90 x10*6/uL    Hemoglobin 12.2 (L) 13.5 - 17.5 g/dL    Hematocrit 40.1 (L) 41.0 - 52.0 %    MCV 79 (L) 80 - 100 fL    MCH 24.1 (L) 26.0 - 34.0 pg    MCHC 30.4 (L) 32.0 - 36.0 g/dL    RDW 15.0 (H) 11.5 - 14.5 %    Platelets 270 150 - 450 x10*3/uL   Magnesium   Result Value Ref Range    Magnesium 2.24 1.60 - 2.40 mg/dL   POCT GLUCOSE   Result Value Ref Range    POCT Glucose 230 (H) 74 - 99 mg/dL   POCT GLUCOSE   Result Value Ref Range    POCT Glucose 206 (H) 74 - 99 mg/dL   POCT GLUCOSE   Result Value Ref Range    POCT Glucose 226 (H) 74 - 99 mg/dL   Renal function panel   Result Value Ref Range    Glucose 241 (H) 74 - 99 mg/dL    Sodium 147 (H) 136 - 145 mmol/L    Potassium 4.2 3.5 - 5.3 mmol/L    Chloride 120 (H) 98 - 107 mmol/L    Bicarbonate 19 (L) 21 - 32 mmol/L    Anion Gap 12 10 - 20 mmol/L    Urea Nitrogen 66 (H) 6 - 23 mg/dL    Creatinine 1.53 (H) 0.50 - 1.30 mg/dL    eGFR 48 (L) >60 mL/min/1.73m*2    Calcium 9.3 8.6 - 10.3 mg/dL    Phosphorus 2.3 (L) 2.5 - 4.9 mg/dL    Albumin 3.2 (L) 3.4 - 5.0 g/dL   POCT GLUCOSE   Result Value Ref Range    POCT Glucose 222 (H) 74 - 99 mg/dL   Blood Gas Arterial Full Panel   Result Value Ref Range    POCT pH, Arterial 7.46 (H) 7.38 - 7.42 pH    POCT pCO2, Arterial 29 (L) 38 - 42 mm Hg    POCT pO2, Arterial 73 (L) 85 - 95 mm Hg    POCT SO2, Arterial 97 94 - 100 %    POCT Oxy Hemoglobin, Arterial 94.2 94.0 - 98.0 %    POCT Hematocrit Calculated, Arterial 37.0 (L) 41.0 - 52.0 %    POCT  Sodium, Arterial 147 (H) 136 - 145 mmol/L    POCT Potassium, Arterial 4.0 3.5 - 5.3 mmol/L    POCT Chloride, Arterial 121 (H) 98 - 107 mmol/L    POCT Ionized Calcium, Arterial 1.41 (H) 1.10 - 1.33 mmol/L    POCT Glucose, Arterial 277 (H) 74 - 99 mg/dL    POCT Lactate, Arterial 1.8 0.4 - 2.0 mmol/L    POCT Base Excess, Arterial -2.2 (L) -2.0 - 3.0 mmol/L    POCT HCO3 Calculated, Arterial 20.6 (L) 22.0 - 26.0 mmol/L    POCT Hemoglobin, Arterial 12.4 (L) 13.5 - 17.5 g/dL    POCT Anion Gap, Arterial 9 (L) 10 - 25 mmo/L    Patient Temperature      FiO2 28 %   POCT GLUCOSE   Result Value Ref Range    POCT Glucose 240 (H) 74 - 99 mg/dL   POCT GLUCOSE   Result Value Ref Range    POCT Glucose 255 (H) 74 - 99 mg/dL      US renal complete    Result Date: 11/18/2023  Interpreted By:  Daria Mckinney, STUDY: US RENAL COMPLETE; 11/18/2023 1:55 pm   INDICATION: Concern for hemorrhagic renal cyst.   COMPARISON: CT chest, abdomen and pelvis 11/17/2023   ACCESSION NUMBER(S): [id number]   ORDERING CLINICIAN: CRYSTAL JULIAN   TECHNIQUE: Sonography of the kidneys and urinary bladder was performed.   FINDINGS: Limited examination due to portable technique and patient's immobility.   Right Kidney: *Renal length: 10.2 cm *Parenchyma: Normal parenchymal echogenicity. Normal parenchymal thickness. *Collecting system: No hydronephrosis. *Calculus: No echogenic, shadowing calculus. *Lesion: Previously shown renal cysts on the prior CT not visualized.     Left Kidney: *Renal length: 10.6 cm *Parenchyma: Normal parenchymal echogenicity. Normal parenchymal thickness. *Collecting system: No hydronephrosis. *Calculus: No echogenic, shadowing calculus. *Lesion: Previously shown renal cyst on the prior CT not visualized.   Bladder: Not visualized.       Limited examination due to portable technique and patient's immobility. Previously shown renal cysts on the prior CT not visualized.   No hydronephrosis.   MACRO: None   Signed by: Daria Mckinney  11/18/2023 2:18 PM Dictation workstation:   [id number]    CT thoracic spine wo IV contrast    Result Date: 11/18/2023  Interpreted By:  Porsche Read, STUDY: CT THORACIC SPINE WO IV CONTRAST; CT LUMBAR SPINE WO IV CONTRAST; 11/18/2023 12:27 am   INDICATION: Signs/Symptoms:fall.   COMPARISON: Same day CT of the chest, abdomen and pelvis.   ACCESSION NUMBER(S): [id number]; [id number]   ORDERING CLINICIAN: ROWAN AVILA   TECHNIQUE: Axial CT images of the thoracic and lumbar spine are obtained. Axial, coronal and sagittal reconstructions are submitted for review.   FINDINGS: THORACIC SPINE:   Thoracic vertebral alignment is maintained, without significant spondylolisthesis.   Multilevel insufficiency endplate changes with small Schmorl's nodes are present in the thoracic spine along the inferior endplates of T6, T7, T8 and T9, without evidence of compression fractures.   Posterior elements of the thoracic spine do not demonstrate any evidence of acute trauma. Well corticated ossific fragments along the spinous processes are T3, T4 and T5 likely represent degenerative changes or sequela of remote injuries.   Mild intervertebral disc height loss is present in the mid and lower thoracic spine.   Although the exam is not optimized to assess the spinal canal, no high-grade spinal canal stenosis is identified.   Paraspinal musculature is unremarkable in appearance.   LUMBAR SPINE:   There are 5 lumbar type non rib-bearing vertebral bodies, with lowest well-formed intervertebral disc space labeled L5-S1.   No compression fracture is identified in the lumbar spine, although multilevel insufficiency endplate changes are present, with Schmorl's nodes noted along the inferior endplates of L4 and L5 and superior endplates of L5 and S1.   Posterior elements of the lumbar spine do not demonstrate any evidence of acute trauma. No abnormal intra spinous distance widening or displaced transverse or spinous process  fractures are identified.   Moderate intervertebral disc height loss is present at L5-S1.   Although the exam is not optimized to assess the spinal canal, no high-grade stenosis is identified, likely mild spinal canal narrowing is present at L3-L4 and L4-L5 due to disc osteophyte complexes and ligamentum flavum thickening.   Paraspinal musculature does not demonstrate any acute abnormalities.       1.  No evidence of acute trauma or high-grade stenosis in the thoracic or lumbar spine. 2. Likely mild spinal canal narrowing is present at the levels of L3-L4 and L4-L5 due to disc osteophyte complexes.   MACRO: None   Signed by: Porsche Read 11/18/2023 1:14 AM Dictation workstation:   [id number]    CT lumbar spine wo IV contrast    Result Date: 11/18/2023  Interpreted By:  Porsche Read, STUDY: CT THORACIC SPINE WO IV CONTRAST; CT LUMBAR SPINE WO IV CONTRAST; 11/18/2023 12:27 am   INDICATION: Signs/Symptoms:fall.   COMPARISON: Same day CT of the chest, abdomen and pelvis.   ACCESSION NUMBER(S): [id number]; [id number]   ORDERING CLINICIAN: ROWAN AVILA   TECHNIQUE: Axial CT images of the thoracic and lumbar spine are obtained. Axial, coronal and sagittal reconstructions are submitted for review.   FINDINGS: THORACIC SPINE:   Thoracic vertebral alignment is maintained, without significant spondylolisthesis.   Multilevel insufficiency endplate changes with small Schmorl's nodes are present in the thoracic spine along the inferior endplates of T6, T7, T8 and T9, without evidence of compression fractures.   Posterior elements of the thoracic spine do not demonstrate any evidence of acute trauma. Well corticated ossific fragments along the spinous processes are T3, T4 and T5 likely represent degenerative changes or sequela of remote injuries.   Mild intervertebral disc height loss is present in the mid and lower thoracic spine.   Although the exam is not optimized to assess the spinal canal, no  high-grade spinal canal stenosis is identified.   Paraspinal musculature is unremarkable in appearance.   LUMBAR SPINE:   There are 5 lumbar type non rib-bearing vertebral bodies, with lowest well-formed intervertebral disc space labeled L5-S1.   No compression fracture is identified in the lumbar spine, although multilevel insufficiency endplate changes are present, with Schmorl's nodes noted along the inferior endplates of L4 and L5 and superior endplates of L5 and S1.   Posterior elements of the lumbar spine do not demonstrate any evidence of acute trauma. No abnormal intra spinous distance widening or displaced transverse or spinous process fractures are identified.   Moderate intervertebral disc height loss is present at L5-S1.   Although the exam is not optimized to assess the spinal canal, no high-grade stenosis is identified, likely mild spinal canal narrowing is present at L3-L4 and L4-L5 due to disc osteophyte complexes and ligamentum flavum thickening.   Paraspinal musculature does not demonstrate any acute abnormalities.       1.  No evidence of acute trauma or high-grade stenosis in the thoracic or lumbar spine. 2. Likely mild spinal canal narrowing is present at the levels of L3-L4 and L4-L5 due to disc osteophyte complexes.   MACRO: None   Signed by: Porsche Read 11/18/2023 1:14 AM Dictation workstation:   [id number]    CT chest abdomen pelvis wo IV contrast    Result Date: 11/18/2023  Interpreted By:  Porsche Read, STUDY: CT CHEST ABDOMEN PELVIS WO CONTRAST;  11/18/2023 12:27 am   INDICATION: Signs/Symptoms:pain.   COMPARISON: None.   ACCESSION NUMBER(S): [id number]   ORDERING CLINICIAN: ROWAN AVILA   TECHNIQUE: CT of the chest, abdomen and pelvis was performed. Contiguous axial images were obtained at 3 mm slice thickness through the chest, abdomen and pelvis. Coronal and sagittal reconstructions at 3 mm slice thickness were performed.  No intravenous or oral contrast agents  were administered.   FINDINGS: Please note that the study is limited without intravenous contrast.   CHEST:     LUNG/PLEURA/LARGE AIRWAYS: Trachea and central airways are patent, without evidence of endobronchial lesion. There is a diverticulum arising from right mainstem bronchus projecting into the subcarinal region.   Within limitations of somewhat motion degraded exam, no pleural effusions, consolidation or pneumothorax is evident.   Several tiny 2-3 mm nodules are present in the lungs bilaterally. No suspicious lung masses are identified.   VESSELS: Aorta and pulmonary arteries are normal caliber.  Moderate vascular calcifications are present in the abdominal aorta. Moderate coronary artery calcifications are present.   HEART: Heart is normal in size with pericardial calcifications present. No pericardial effusion is noted.   MEDIASTINUM AND DUDLEY: No mediastinal, hilar or axillary lymph nodes are present.  The esophagus is unremarkable in appearance. There is no evidence of pneumomediastinum.   CHEST WALL AND LOWER NECK: Soft tissues of the chest wall do not demonstrate any acute abnormalities. No displaced rib fractures or other osseous injuries are identified.   ABDOMEN:   LIVER: Liver is normal in size and within limits of noncontrast exam does not demonstrate any acute abnormalities. No perihepatic free fluid is evident.   BILE DUCTS: No intrahepatic or extrahepatic biliary dilatation is present.   GALLBLADDER: Gallbladder is unremarkable in appearance.   PANCREAS: No pancreatic ductal dilatation or peripancreatic stranding is evident.   SPLEEN: Spleen is unremarkable in appearance.   ADRENAL GLANDS: Adrenal glands are unremarkable.   KIDNEYS AND URETERS: Kidneys are symmetric in size without evidence of hydronephrosis bilaterally. Several lobulated low-density lesions arise from the right kidney, possibly representing cysts. No ureteral dilatation is evident.   PELVIS:   BLADDER: Bladder is unremarkable in  appearance.   REPRODUCTIVE ORGANS: Prostate is enlarged.   BOWEL: Small bowel is nondilated. No inflammatory wall thickening or abnormal dilatation is present in the large bowel. Appendix is unremarkable in appearance in the right lower quadrant.   Scattered diverticula are present in the sigmoid colon without evidence of acute diverticulitis.   VESSELS: There is no aneurysmal dilatation of the abdominal aorta. The IVC is within normal limits. Extensive vascular calcifications are present in the abdominal aorta.   PERITONEUM/RETROPERITONEUM/LYMPH NODES: There is no evidence of free air, free fluid, or thick-walled collections in the abdomen and pelvis. No enlarged lymphadenopathy is evident.   ABDOMINAL WALL: The abdominal wall soft tissues appear normal.   BONES: Please refer to separately dictated CT of the thoracic and lumbar spine for further characterization of spinal findings.   No acute osseous abnormality is identified in the pelvis.       1. No evidence of acute abnormality is present in the chest, abdomen or pelvis. No evidence of acute trauma. 2. Moderate coronary artery calcifications, with pericardial calcifications. Correlate with restrictive pericarditis. 3. Several 2-3 mm solid nodules are present in the upper lungs bilaterally, likely benign by size criteria if there is no history of malignancy, although if patient is has risk factors for lung cancer, yearly screening CT is warranted. 4. Scattered diverticula are present in the sigmoid colon without evidence of acute diverticulitis. 5. Low-density lobulated lesions in the lower pole of the right kidney are incompletely characterized on current exam, although cyst cystically likely represent cysts. Slightly more hyperdense exophytic lesion arising from the lower pole of the left kidney may represent a proteinaceous or hemorrhagic cyst but is incompletely characterized on study. 6. Additional findings as detailed above.     MACRO: None   Signed by:  Porsche Read 11/18/2023 1:05 AM Dictation workstation:   [id number]    CT head wo IV contrast    Result Date: 11/18/2023  Interpreted By:  Porsche Read, STUDY: CT HEAD WO IV CONTRAST; CT CERVICAL SPINE WO IV CONTRAST; CT FACIAL BONES WO IV CONTRAST; CT 3D RECONSTRUCTION;  11/18/2023 12:28 am   INDICATION: Signs/Symptoms:altered mental state; Signs/Symptoms:pain; Signs/Symptoms:contusion; Signs/Symptoms:possible fall contusion.   COMPARISON: None.   ACCESSION NUMBER(S): [id number]; [id number]; [id number]; [id number]   ORDERING CLINICIAN: ROWAN AVILA   TECHNIQUE: Noncontrast axial CT scan of head was performed, with coronal and sagittal reformats provided. The images were reviewed in bone, brain, blood and soft tissue windows.   Thin cut axial CT images through the facial bones were obtained and reconstructed in the coronal and sagittal plane. 3D reconstruction was created on a dedicated workstation and provided for interpretation.   Axial CT images of the cervical spine are obtained. Axial, coronal and sagittal reconstructions are provided for review.   FINDINGS: CT HEAD:   No hyperdense intracranial hemorrhage is evident. There is no mass effect or midline shift.   Gray-white differentiation is intact, without evidence of CT apparent transcortical infarct.   There is somewhat disproportionate enlargement of the supratentorial ventricular system relative to basal cisterns nonspecific finding that may represent asymmetric central volume loss or normal pressure hydrocephalus. No abnormal extra-axial fluid collections are identified. Basal cisterns are patent.   Scalp soft tissues do not demonstrate any acute abnormalities. Calvarium is unremarkable in appearance without evidence of depressed skull fracture. Mastoid air cells and middle ear cavities are well aerated without evidence of fluid fluid levels.   CT FACIAL BONES:   Mild left supraorbital and periorbital soft tissue swelling and  edema is present, without evidence of associated fracture. Bony orbits are intact.   Intraorbital structures are symmetric in appearance without evidence of acute trauma.   Facial bones appear intact without evidence of displaced fracture. Nasal bones are unremarkable in appearance.   Visualized paranasal sinuses are well aerated without evidence of fluid fluid levels.   Soft tissues overlying the face and skull base do not demonstrate any acute abnormality.   There are numerous missing teeth, with dental cavities and periapical lucencies present in the remaining lower incisors. Temporomandibular joints are intact. No trauma to the oral cavity is identified.   Buccal soft tissues do not demonstrate any acute abnormalities. No fluid collections or soft tissue gas is identified. Parotid and submandibular glands are unremarkable in appearance.   CT C-SPINE:   There is straightening of normal lordotic curvature of the cervical spine, with a 1-2 mm anterolisthesis of C4 on C5.   No compression fractures are identified in the cervical spine, although mild insufficiency endplate changes with associated Schmorl's nodes are present along the inferior endplate of C6 and superior endplate of C7.   Posterior elements of the cervical spine do not demonstrate any evidence of acute trauma. No abnormal intra spinous distance widening or displaced transverse or spinous process fractures are identified.   Craniocervical junction is intact. Facet joints are preserved with mild degenerate facet osteoarthropathy present at several levels, most pronounced at C3-C4 and C4-C5 on the left.   Mild intervertebral disc height loss is present at C4-C5 and C6-C7.   No high-grade spinal canal stenosis is identified. Mild spinal canal narrowing is possible at the level of C3-C4 due to mild disc bulge and ligamentum flavum thickening with slight effacement of the cervical spinal cord.   Mild neural foraminal narrowing is present at C4-C5 on the  left due to endplate spurring and hypertrophic facet changes.   Prevertebral and paraspinal soft tissues do not demonstrate any acute abnormalities.       CT HEAD: 1. No evidence of hemorrhage, depressed skull fracture, or other acute intracranial trauma. 2. Disproportionate enlargement of the supratentorial ventricular system relative to basal cisterns and sulci, nonspecific finding that may represent asymmetric central volume loss or normal pressure hydrocephalus.   CT FACIAL BONES: 1. Left periorbital and supraorbital soft tissue swelling, without evidence of displaced orbital or facial bone fracture. 2. Large dental cavities and periapical lucencies are present in the few remaining lower incisors. Correlate with dental exam.   CT C-SPINE: 1. No evidence of acute trauma to the cervical spine.   MACRO: None   Signed by: Porsche Read 11/18/2023 12:49 AM Dictation workstation:   [id number]    CT cervical spine wo IV contrast    Result Date: 11/18/2023  Interpreted By:  Porsche Read, STUDY: CT HEAD WO IV CONTRAST; CT CERVICAL SPINE WO IV CONTRAST; CT FACIAL BONES WO IV CONTRAST; CT 3D RECONSTRUCTION;  11/18/2023 12:28 am   INDICATION: Signs/Symptoms:altered mental state; Signs/Symptoms:pain; Signs/Symptoms:contusion; Signs/Symptoms:possible fall contusion.   COMPARISON: None.   ACCESSION NUMBER(S): [id number]; [id number]; [id number]; [id number]   ORDERING CLINICIAN: ROWAN AVILA   TECHNIQUE: Noncontrast axial CT scan of head was performed, with coronal and sagittal reformats provided. The images were reviewed in bone, brain, blood and soft tissue windows.   Thin cut axial CT images through the facial bones were obtained and reconstructed in the coronal and sagittal plane. 3D reconstruction was created on a dedicated workstation and provided for interpretation.   Axial CT images of the cervical spine are obtained. Axial, coronal and sagittal reconstructions are provided for review.    FINDINGS: CT HEAD:   No hyperdense intracranial hemorrhage is evident. There is no mass effect or midline shift.   Gray-white differentiation is intact, without evidence of CT apparent transcortical infarct.   There is somewhat disproportionate enlargement of the supratentorial ventricular system relative to basal cisterns nonspecific finding that may represent asymmetric central volume loss or normal pressure hydrocephalus. No abnormal extra-axial fluid collections are identified. Basal cisterns are patent.   Scalp soft tissues do not demonstrate any acute abnormalities. Calvarium is unremarkable in appearance without evidence of depressed skull fracture. Mastoid air cells and middle ear cavities are well aerated without evidence of fluid fluid levels.   CT FACIAL BONES:   Mild left supraorbital and periorbital soft tissue swelling and edema is present, without evidence of associated fracture. Bony orbits are intact.   Intraorbital structures are symmetric in appearance without evidence of acute trauma.   Facial bones appear intact without evidence of displaced fracture. Nasal bones are unremarkable in appearance.   Visualized paranasal sinuses are well aerated without evidence of fluid fluid levels.   Soft tissues overlying the face and skull base do not demonstrate any acute abnormality.   There are numerous missing teeth, with dental cavities and periapical lucencies present in the remaining lower incisors. Temporomandibular joints are intact. No trauma to the oral cavity is identified.   Buccal soft tissues do not demonstrate any acute abnormalities. No fluid collections or soft tissue gas is identified. Parotid and submandibular glands are unremarkable in appearance.   CT C-SPINE:   There is straightening of normal lordotic curvature of the cervical spine, with a 1-2 mm anterolisthesis of C4 on C5.   No compression fractures are identified in the cervical spine, although mild insufficiency endplate changes  with associated Schmorl's nodes are present along the inferior endplate of C6 and superior endplate of C7.   Posterior elements of the cervical spine do not demonstrate any evidence of acute trauma. No abnormal intra spinous distance widening or displaced transverse or spinous process fractures are identified.   Craniocervical junction is intact. Facet joints are preserved with mild degenerate facet osteoarthropathy present at several levels, most pronounced at C3-C4 and C4-C5 on the left.   Mild intervertebral disc height loss is present at C4-C5 and C6-C7.   No high-grade spinal canal stenosis is identified. Mild spinal canal narrowing is possible at the level of C3-C4 due to mild disc bulge and ligamentum flavum thickening with slight effacement of the cervical spinal cord.   Mild neural foraminal narrowing is present at C4-C5 on the left due to endplate spurring and hypertrophic facet changes.   Prevertebral and paraspinal soft tissues do not demonstrate any acute abnormalities.       CT HEAD: 1. No evidence of hemorrhage, depressed skull fracture, or other acute intracranial trauma. 2. Disproportionate enlargement of the supratentorial ventricular system relative to basal cisterns and sulci, nonspecific finding that may represent asymmetric central volume loss or normal pressure hydrocephalus.   CT FACIAL BONES: 1. Left periorbital and supraorbital soft tissue swelling, without evidence of displaced orbital or facial bone fracture. 2. Large dental cavities and periapical lucencies are present in the few remaining lower incisors. Correlate with dental exam.   CT C-SPINE: 1. No evidence of acute trauma to the cervical spine.   MACRO: None   Signed by: Porsche Read 11/18/2023 12:49 AM Dictation workstation:   [id number]    CT maxillofacial bones wo IV contrast    Result Date: 11/18/2023  Interpreted By:  Porsche Read, STUDY: CT HEAD WO IV CONTRAST; CT CERVICAL SPINE WO IV CONTRAST; CT FACIAL  BONES WO IV CONTRAST; CT 3D RECONSTRUCTION;  11/18/2023 12:28 am   INDICATION: Signs/Symptoms:altered mental state; Signs/Symptoms:pain; Signs/Symptoms:contusion; Signs/Symptoms:possible fall contusion.   COMPARISON: None.   ACCESSION NUMBER(S): [id number]; [id number]; [id number]; [id number]   ORDERING CLINICIAN: ROWAN AVILA   TECHNIQUE: Noncontrast axial CT scan of head was performed, with coronal and sagittal reformats provided. The images were reviewed in bone, brain, blood and soft tissue windows.   Thin cut axial CT images through the facial bones were obtained and reconstructed in the coronal and sagittal plane. 3D reconstruction was created on a dedicated workstation and provided for interpretation.   Axial CT images of the cervical spine are obtained. Axial, coronal and sagittal reconstructions are provided for review.   FINDINGS: CT HEAD:   No hyperdense intracranial hemorrhage is evident. There is no mass effect or midline shift.   Gray-white differentiation is intact, without evidence of CT apparent transcortical infarct.   There is somewhat disproportionate enlargement of the supratentorial ventricular system relative to basal cisterns nonspecific finding that may represent asymmetric central volume loss or normal pressure hydrocephalus. No abnormal extra-axial fluid collections are identified. Basal cisterns are patent.   Scalp soft tissues do not demonstrate any acute abnormalities. Calvarium is unremarkable in appearance without evidence of depressed skull fracture. Mastoid air cells and middle ear cavities are well aerated without evidence of fluid fluid levels.   CT FACIAL BONES:   Mild left supraorbital and periorbital soft tissue swelling and edema is present, without evidence of associated fracture. Bony orbits are intact.   Intraorbital structures are symmetric in appearance without evidence of acute trauma.   Facial bones appear intact without evidence of displaced fracture. Nasal  bones are unremarkable in appearance.   Visualized paranasal sinuses are well aerated without evidence of fluid fluid levels.   Soft tissues overlying the face and skull base do not demonstrate any acute abnormality.   There are numerous missing teeth, with dental cavities and periapical lucencies present in the remaining lower incisors. Temporomandibular joints are intact. No trauma to the oral cavity is identified.   Buccal soft tissues do not demonstrate any acute abnormalities. No fluid collections or soft tissue gas is identified. Parotid and submandibular glands are unremarkable in appearance.   CT C-SPINE:   There is straightening of normal lordotic curvature of the cervical spine, with a 1-2 mm anterolisthesis of C4 on C5.   No compression fractures are identified in the cervical spine, although mild insufficiency endplate changes with associated Schmorl's nodes are present along the inferior endplate of C6 and superior endplate of C7.   Posterior elements of the cervical spine do not demonstrate any evidence of acute trauma. No abnormal intra spinous distance widening or displaced transverse or spinous process fractures are identified.   Craniocervical junction is intact. Facet joints are preserved with mild degenerate facet osteoarthropathy present at several levels, most pronounced at C3-C4 and C4-C5 on the left.   Mild intervertebral disc height loss is present at C4-C5 and C6-C7.   No high-grade spinal canal stenosis is identified. Mild spinal canal narrowing is possible at the level of C3-C4 due to mild disc bulge and ligamentum flavum thickening with slight effacement of the cervical spinal cord.   Mild neural foraminal narrowing is present at C4-C5 on the left due to endplate spurring and hypertrophic facet changes.   Prevertebral and paraspinal soft tissues do not demonstrate any acute abnormalities.       CT HEAD: 1. No evidence of hemorrhage, depressed skull fracture, or other acute intracranial  trauma. 2. Disproportionate enlargement of the supratentorial ventricular system relative to basal cisterns and sulci, nonspecific finding that may represent asymmetric central volume loss or normal pressure hydrocephalus.   CT FACIAL BONES: 1. Left periorbital and supraorbital soft tissue swelling, without evidence of displaced orbital or facial bone fracture. 2. Large dental cavities and periapical lucencies are present in the few remaining lower incisors. Correlate with dental exam.   CT C-SPINE: 1. No evidence of acute trauma to the cervical spine.   MACRO: None   Signed by: Porsche Read 11/18/2023 12:49 AM Dictation workstation:   [id number]    CT 3D reconstruction    Result Date: 11/18/2023  Interpreted By:  Porsche Read, STUDY: CT HEAD WO IV CONTRAST; CT CERVICAL SPINE WO IV CONTRAST; CT FACIAL BONES WO IV CONTRAST; CT 3D RECONSTRUCTION;  11/18/2023 12:28 am   INDICATION: Signs/Symptoms:altered mental state; Signs/Symptoms:pain; Signs/Symptoms:contusion; Signs/Symptoms:possible fall contusion.   COMPARISON: None.   ACCESSION NUMBER(S): [id number]; [id number]; [id number]; [id number]   ORDERING CLINICIAN: ROWAN AVILA   TECHNIQUE: Noncontrast axial CT scan of head was performed, with coronal and sagittal reformats provided. The images were reviewed in bone, brain, blood and soft tissue windows.   Thin cut axial CT images through the facial bones were obtained and reconstructed in the coronal and sagittal plane. 3D reconstruction was created on a dedicated workstation and provided for interpretation.   Axial CT images of the cervical spine are obtained. Axial, coronal and sagittal reconstructions are provided for review.   FINDINGS: CT HEAD:   No hyperdense intracranial hemorrhage is evident. There is no mass effect or midline shift.   Gray-white differentiation is intact, without evidence of CT apparent transcortical infarct.   There is somewhat disproportionate enlargement of the  supratentorial ventricular system relative to basal cisterns nonspecific finding that may represent asymmetric central volume loss or normal pressure hydrocephalus. No abnormal extra-axial fluid collections are identified. Basal cisterns are patent.   Scalp soft tissues do not demonstrate any acute abnormalities. Calvarium is unremarkable in appearance without evidence of depressed skull fracture. Mastoid air cells and middle ear cavities are well aerated without evidence of fluid fluid levels.   CT FACIAL BONES:   Mild left supraorbital and periorbital soft tissue swelling and edema is present, without evidence of associated fracture. Bony orbits are intact.   Intraorbital structures are symmetric in appearance without evidence of acute trauma.   Facial bones appear intact without evidence of displaced fracture. Nasal bones are unremarkable in appearance.   Visualized paranasal sinuses are well aerated without evidence of fluid fluid levels.   Soft tissues overlying the face and skull base do not demonstrate any acute abnormality.   There are numerous missing teeth, with dental cavities and periapical lucencies present in the remaining lower incisors. Temporomandibular joints are intact. No trauma to the oral cavity is identified.   Buccal soft tissues do not demonstrate any acute abnormalities. No fluid collections or soft tissue gas is identified. Parotid and submandibular glands are unremarkable in appearance.   CT C-SPINE:   There is straightening of normal lordotic curvature of the cervical spine, with a 1-2 mm anterolisthesis of C4 on C5.   No compression fractures are identified in the cervical spine, although mild insufficiency endplate changes with associated Schmorl's nodes are present along the inferior endplate of C6 and superior endplate of C7.   Posterior elements of the cervical spine do not demonstrate any evidence of acute trauma. No abnormal intra spinous distance widening or displaced transverse  or spinous process fractures are identified.   Craniocervical junction is intact. Facet joints are preserved with mild degenerate facet osteoarthropathy present at several levels, most pronounced at C3-C4 and C4-C5 on the left.   Mild intervertebral disc height loss is present at C4-C5 and C6-C7.   No high-grade spinal canal stenosis is identified. Mild spinal canal narrowing is possible at the level of C3-C4 due to mild disc bulge and ligamentum flavum thickening with slight effacement of the cervical spinal cord.   Mild neural foraminal narrowing is present at C4-C5 on the left due to endplate spurring and hypertrophic facet changes.   Prevertebral and paraspinal soft tissues do not demonstrate any acute abnormalities.       CT HEAD: 1. No evidence of hemorrhage, depressed skull fracture, or other acute intracranial trauma. 2. Disproportionate enlargement of the supratentorial ventricular system relative to basal cisterns and sulci, nonspecific finding that may represent asymmetric central volume loss or normal pressure hydrocephalus.   CT FACIAL BONES: 1. Left periorbital and supraorbital soft tissue swelling, without evidence of displaced orbital or facial bone fracture. 2. Large dental cavities and periapical lucencies are present in the few remaining lower incisors. Correlate with dental exam.   CT C-SPINE: 1. No evidence of acute trauma to the cervical spine.   MACRO: None   Signed by: Porsche Read 11/18/2023 12:49 AM Dictation workstation:   [id number]      Assessment/Plan    Néstor Castro is a 73 y.o. malewith past medical history of HF with Improved EF, Atrial fibrillation on Eliquis, non obstructive coronary artery disease, aortic stenosis, sleep apnea admitted for DKA and Altered mental status.     Neurology  #Acute Encephalopathy  - DKA now resolved it is uncertain why patient is still lethargic  - VBG done shows no evidence of CO2 retention with values of 7.43/29/110   - CTH at the time  of presentation was not concerning for intracranial lesion and or CVA   - Continue to monitor for change, if no improvement in 24hrs, may re-scan brain      Pulmonology   #Lung Nodules  - Outpatient Ct in 12 months     Cardiology  #Heart Failure with Improved EF  - Last echocardiogram from 12/2020 showed EF of 55  - Hold Lasix, lisinopril and Jardiance     #Atrial Fibrillation with Rapid Ventricular Response  - Hold PO Metoprolol 25 twice daily and Flecainide 100 twice daily while patient is unconscious  - Hold Anticoagulant due to Multiple bruises after fall and possible hemorrhagic renal cyst on CT  - IV metoprolol 5 q6h until patient is awake to take PO meds  - Patient still not controlled, if HR persisting >120's, will start dilt gtt   - TTE ordered     #HLD  #Non Obstructive CAD  -Continue aspirin and atorvastatin     Gastroenterology   No acute issues     Renal   #Acute Kidney Injury   -Pre renal in nature in nature   -Baseline Creatine< 1     # proteinaceous or hemorrhagic cyst  - noted on CT, Renal US exam is limited without cysts observed  - Urology consulted, appreciate recs     #Rhabdomyolysis  -likely due to fall  - CK 1200  - On D5 nacl 0.45 100 ml/hr     #Hypernatremia  -corrected sodium 147  -cont fluids     Endocrinology  #Diabetic Ketoacidosis  #Uncontrolled Type 2 DM  -Patient last A1C was 11.7 on 03/06  -Home regimen: Lantus 50 unit twice daily, humalog 10 unit before meal, and empagliflozin 10 daily  -Gap closed, BG sitting around 200-250, not awake or eating  Plan  -Continue Insulin Drip at a rate of 3unit  -D5 and NACL 0.45 100 ML/HR  - RFP Q12  - A1c and Beta-hydroxybutyrate pending  - DKA protocol in place  - Hold Empagliflozin     Hematology   -No acute issues     Infectious Disease  #Cellulitis   #Leukocytosis   -Likely reactive in the setting of DKA   - Patient noted to have a warm and edematous left leg  - likely in the setting of cellulitis   - Will start patient on vancomycinb IV  (11/19)  - Follow up Bcx      Musculoskeletal  #Fall  #Multiple Bruises  - History of multiple falls  -PT/OT consulted  - Monitor bruises for now     Access: piv  Diet: NPO  GI Prophylaxis: none  DVT: SCD  Antibiotics: None  Disposition: Patient admitted for DKA and acute encephalopathy. Patient is very lethargic, unrelated to DKA. Will obtain a CTH if there continuous lethargy     Jose Tolliver MD

## 2023-11-19 NOTE — PROGRESS NOTES
"Vancomycin Dosing by Pharmacy- INITIAL    Néstor Castro 73YM  Ht 6'0\" Wt 272# (124kg) Hospital Day #1/Vancomycin Day #0  Pharmacy has been consulted for vancomycin dosing for cellulitis, skin and soft tissue. Based on the patient's indication and renal status this patient will be dosed based on a goal trough/random level of 15-20 and AUC/JAMES range 400-600    Renal function is currently improving.    Visit Vitals  /67   Pulse (!) 132   Temp 37 °C (98.6 °F)   Resp 19        Lab Results   Component Value Date    CREATININE 1.53 (H) 11/19/2023    CREATININE 1.75 (H) 11/19/2023    CREATININE 2.16 (H) 11/18/2023    CREATININE 2.27 (H) 11/18/2023           Assessment/Plan     Patient will be given a loading dose of 2000 mg today x1 dose at 1300  Will start vancomycin maintenance,  1500 mg every 24 hours 11/20/23 at 1200    This dosing regimen is predicted by [facility] to result in the following pharmacokinetic parameters:  Loading dose: 2000 mg at 13:00 11/19/2023.  Regimen: 1500 mg IV every 24 hours   Start time: 12:00 on 11/20/2023  Exposure target: AUC24 (range)400-600 mg/L.hr   AUC24,ss: 472 mg/L.hr  Probability of AUC24 > 400: 68 %  Ctrough,ss: 14.7 mg/L  Probability of Ctrough,ss > 20: 24 %  Probability of nephrotoxicity (Lodise BRIGID 2009): 10 %      Follow-up level will be ordered on 11/20 am labs  Will continue to monitor renal function daily while on vancomycin and order serum creatinine at least every 48 hours if not already ordered.  Follow for continued vancomycin needs, clinical response, and signs/symptoms of toxicity.       Jonh Andres, PharmD       "

## 2023-11-20 NOTE — PROGRESS NOTES
Jyoti Castro is a 73 y.o. male on day 2 of admission presenting with DKA, type 2, not at goal (CMS/HCC).      Subjective   Patient is sleeping in bed upon room entry. HDS. Patient had transient fever of 100.8 overnight. He opens eyes when his name is said. He is able to stick out his tongue when asked. He does not exhibit any intelligible speech.     Objective     Last Recorded Vitals  /71   Pulse 87   Temp 37.2 °C (99 °F)   Resp 19   Wt 124 kg (273 lb 5.9 oz)   SpO2 95%   Intake/Output last 3 Shifts:    Intake/Output Summary (Last 24 hours) at 11/20/2023 1303  Last data filed at 11/20/2023 0955  Gross per 24 hour   Intake 3064.83 ml   Output 3000 ml   Net 64.83 ml       Admission Weight  Weight: 121 kg (266 lb 12.1 oz) (11/18/23 0553)    Daily Weight  11/20/23 : 124 kg (273 lb 5.9 oz)    Image Results  Transthoracic Echo (TTE) Complete     [facility], [address] [place] [address]                Tel [phone] and Fax [phone]    TRANSTHORACIC ECHOCARDIOGRAM REPORT       Patient Name:     JYOTI CASTRO   Reading Physician:   [id number] Jernigan MD  Study Date:       11/20/2023          Ordering Provider:   [id number] FRAGA  MRN/PID:          [id number]            Fellow:  Accession#:       [id number]        Nurse:               Angela Cooper RN  Date of           1950 / 73      Sonographer:         Keyonna Klein RDCS  Birth/Age:        years  Gender:           M                   Additional Staff:  Height:           182.88 cm           Admit Date:          11/18/2023  Weight:           124.74 kg           Admission Status:    Inpatient - STAT  BSA:              2.44 m2             Encounter#:          [id number]                                        Department Location: [facility] ICU  Blood Pressure: 127 /61 mmHg    Study Type:    TRANSTHORACIC ECHO (TTE) COMPLETE  Diagnosis/ICD: Unspecified atrial  fibrillation-I48.91  Indication:    Afib  CPT Code:      Echo Complete w Full Doppler-53718    Patient History:  Pertinent History: A-Fib, CAD and As, Elev. trop.    Study Detail: The following Echo studies were performed: 2D, M-Mode, Doppler and                color flow. Technically challenging study due to patient lying in                supine position and body habitus. Definity used as a contrast                agent for endocardial border definition. Total contrast used for                this procedure was 1.5 mL via IV push. Unable to obtain LA, RA                janiya. and subcostal view. The patient was asleep.       PHYSICIAN INTERPRETATION:  Left Ventricle: The left ventricular systolic function probably normal. However accurate estimated can not be done due to poor image quality. The left ventricular cavity size is normal. There is mildly increased left ventricular posterior wall thickness. Left ventricular diastolic filling was indeterminate.  Left Atrium: The left atrium was not well visualized.  Right Ventricle: The right ventricle is normal in size. There is normal right ventricular global systolic function.  Right Atrium: The right atrium was not well visualized.  Aortic Valve: The aortic valve was not well visualized. There is moderate aortic valve cusp calcification. There is evidence of moderate aortic valve stenosis.  There is no evidence of aortic valve regurgitation. The peak instantaneous gradient of the aortic valve is 34.3 mmHg. The mean gradient of the aortic valve is 19.0 mmHg.  Mitral Valve: The mitral valve is mild to moderately thickened. There is mild to moderate mitral annular calcification. There is trace mitral valve regurgitation.  Tricuspid Valve: The tricuspid valve was not well visualized. There is trace to mild tricuspid regurgitation. The right ventricular systolic pressure is unable to be estimated.  Pulmonic Valve: The pulmonic valve is not well visualized. There is trace  pulmonic valve regurgitation.  Pericardium: There is no pericardial effusion noted.  Aorta: The aortic root is normal.  In comparison to the previous echocardiogram(s): There are no prior studies on this patient for comparison purposes.       CONCLUSIONS:   1. Left ventricular systolic function probably normal. However accurate estimated can not be done due to poor image quality.   2. Poorly visualized anatomical structures due to suboptimal image quality.   3. Moderate aortic valve stenosis.    QUANTITATIVE DATA SUMMARY:  2D MEASUREMENTS:                           Normal Ranges:  IVSd:          1.11 cm   (0.6-1.1cm)  LVPWd:         1.03 cm   (0.6-1.1cm)  LVIDd:         5.07 cm   (3.9-5.9cm)  LVIDs:         3.98 cm  LV Mass Index: 83.6 g/m2  LV % FS        21.5 %    AORTA MEASUREMENTS:                     Normal Ranges:  Asc Ao, d: 3.00 cm (2.1-3.4cm)    LV DIASTOLIC FUNCTION:                      Normal Ranges:  MV Peak E: 0.89 m/s (0.7-1.2 m/s)  MV Peak A: 0.88 m/s (0.42-0.7 m/s)  E/A Ratio: 1.00     (1.0-2.2)    MITRAL VALVE:                  Normal Ranges:  MV DT: 180 msec (150-240msec)    AORTIC VALVE:                                     Normal Ranges:  AoV Vmax:                2.93 m/s  (<=1.7m/s)  AoV Peak P.3 mmHg (<20mmHg)  AoV Mean P.0 mmHg (1.7-11.5mmHg)  LVOT Max Eddie:            0.75 m/s  (<=1.1m/s)  AoV VTI:                 49.70 cm  (18-25cm)  LVOT VTI:                15.00 cm  LVOT Diameter:           2.30 cm   (1.8-2.4cm)  AoV Area, VTI:           1.25 cm2  (2.5-5.5cm2)  AoV Area,Vmax:           1.07 cm2  (2.5-4.5cm2)  AoV Dimensionless Index: 0.30       RIGHT VENTRICLE:  RV Basal 3.48 cm  RV Mid   3.95 cm  RV Major 8.6 cm  TAPSE:   10.7 mm  RV s'    0.11 m/s    TRICUSPID VALVE/RVSP:                              Normal Ranges:  Peak TR Velocity: 2.23 m/s  RV Syst Pressure: 22.9 mmHg (< 30mmHg)    PULMONIC VALVE:                       Normal Ranges:  PV Max Eddie: 0.7  m/s  (0.6-0.9m/s)  PV Max P.7 mmHg       [id number] Debora Jernigan MD  Electronically signed on 2023 at 12:17:13 PM       ** Final **  CT head wo IV contrast  Narrative: Interpreted By:  Miya Sutton,   STUDY:  CT HEAD WO IV CONTRAST;  2023 10:13 am      INDICATION:  Signs/Symptoms:continued altered mentation.      COMPARISON:  12:04 a.m. the same day      ACCESSION NUMBER(S):  [id number]      ORDERING CLINICIAN:  BECCA RAMOS      TECHNIQUE:  Unenhanced CT images of the head were obtained.      FINDINGS:  Diffuse atrophy with enlargement of the extra-axial spaces and  cerebral sulci similar to the prior exam. Moderate ventriculomegaly  likely out of proportion to the degree of cerebral atrophy also  similar to the prior study. Subtle periventricular white matter  hypodensities consistent with small vessel ischemic disease again  seen. No acute intracranial hemorrhage or mass-effect. No midline  shift. No extra-axial fluid collection.      No focal calvarial lesion.      Visualized paranasal sinuses are clear.      Impression: No acute intracranial hemorrhage or mass-effect. Moderate  ventriculomegaly likely out of proportion of the degree of cerebral  atrophy and raising question of communicating hydrocephalus again  noted, similar to 2 days prior.      MACRO:  None.      Signed by: Miya Sutton 2023 11:30 AM  Dictation workstation:   [id number]      Physical Exam  Constitutional:       Appearance: He is ill-appearing.   Cardiovascular:      Rate and Rhythm: Normal rate. Rhythm irregular.      Pulses: Normal pulses.      Heart sounds: Murmur heard.   Pulmonary:      Effort: Pulmonary effort is normal.      Breath sounds: Normal breath sounds.   Abdominal:      General: Abdomen is flat. Bowel sounds are normal.      Palpations: Abdomen is soft.   Skin:     General: Skin is warm and dry.   Neurological:      Mental Status: He is lethargic and disoriented.      GCS: GCS eye subscore is 4. GCS  verbal subscore is 1. GCS motor subscore is 6.       Relevant Results  Scheduled medications  [Held by provider] aspirin, 81 mg, oral, q24h  atorvastatin, 40 mg, oral, Daily  bisacodyl, 10 mg, rectal, Once  ceFAZolin, 1 g, intravenous, q8h  [Held by provider] flecainide, 100 mg, oral, BID  lactated Ringer's, 1,000 mL/hr, intravenous, Once  [Held by provider] metoprolol tartrate, 25 mg, oral, BID  nystatin, 5 mL, Swish & Spit, 4x daily  potassium phosphate, 21 mmol, intravenous, Once      Continuous medications  dextrose 5 % in water (D5W), 100 mL/hr  heparin, 0-4,500 Units/hr  insulin regular infusion, 0-20 Units/hr, Last Rate: 3 Units/hr (11/20/23 1215)      PRN medications  PRN medications: acetaminophen, dextrose 10 % in water (D10W), dextrose, glucagon, heparin, insulin regular, metoprolol, oxygen    Results for orders placed or performed during the hospital encounter of 11/18/23 (from the past 24 hour(s))   POCT GLUCOSE   Result Value Ref Range    POCT Glucose 267 (H) 74 - 99 mg/dL   POCT GLUCOSE   Result Value Ref Range    POCT Glucose 269 (H) 74 - 99 mg/dL   Vancomycin   Result Value Ref Range    Vancomycin 13.2 5.0 - 20.0 ug/mL   Blood Culture    Specimen: Peripheral Venipuncture; Blood culture   Result Value Ref Range    Blood Culture Loaded on Instrument - Culture in progress    Blood Culture    Specimen: Peripheral Venipuncture; Blood culture   Result Value Ref Range    Blood Culture Loaded on Instrument - Culture in progress    Sedimentation rate, automated   Result Value Ref Range    Sedimentation Rate 70 (H) 0 - 20 mm/h   C-reactive protein   Result Value Ref Range    C-Reactive Protein 18.14 (H) <1.00 mg/dL   Lactate   Result Value Ref Range    Lactate 1.6 0.4 - 2.0 mmol/L   Green Top   Result Value Ref Range    Extra Tube Hold for add-ons.    POCT GLUCOSE   Result Value Ref Range    POCT Glucose 271 (H) 74 - 99 mg/dL   POCT GLUCOSE   Result Value Ref Range    POCT Glucose 255 (H) 74 - 99 mg/dL   CBC    Result Value Ref Range    WBC 11.3 4.4 - 11.3 x10*3/uL    nRBC 0.0 0.0 - 0.0 /100 WBCs    RBC 4.65 4.50 - 5.90 x10*6/uL    Hemoglobin 11.5 (L) 13.5 - 17.5 g/dL    Hematocrit 38.1 (L) 41.0 - 52.0 %    MCV 82 80 - 100 fL    MCH 24.7 (L) 26.0 - 34.0 pg    MCHC 30.2 (L) 32.0 - 36.0 g/dL    RDW 15.4 (H) 11.5 - 14.5 %    Platelets 205 150 - 450 x10*3/uL   Magnesium   Result Value Ref Range    Magnesium 2.16 1.60 - 2.40 mg/dL   Renal Function Panel   Result Value Ref Range    Glucose 273 (H) 74 - 99 mg/dL    Sodium 150 (H) 136 - 145 mmol/L    Potassium 3.8 3.5 - 5.3 mmol/L    Chloride 119 (H) 98 - 107 mmol/L    Bicarbonate 23 21 - 32 mmol/L    Anion Gap 12 10 - 20 mmol/L    Urea Nitrogen 40 (H) 6 - 23 mg/dL    Creatinine 1.14 0.50 - 1.30 mg/dL    eGFR 68 >60 mL/min/1.73m*2    Calcium 9.3 8.6 - 10.3 mg/dL    Phosphorus 1.9 (L) 2.5 - 4.9 mg/dL    Albumin 3.0 (L) 3.4 - 5.0 g/dL   POCT GLUCOSE   Result Value Ref Range    POCT Glucose 265 (H) 74 - 99 mg/dL   Transthoracic Echo (TTE) Complete   Result Value Ref Range    AV pk da 2.93     AV mn grad 19.0     LVOT diam 2.30     MV E/A ratio 1.00     Tricuspid annular plane systolic excursion 1.1     RV free wall pk S' 11.00     LVIDd 5.07     RVSP 22.9     Aortic Valve Area by Continuity of VTI 1.25     Aortic Valve Area by Continuity of Peak Velocity 1.07     AV pk grad 34.3     BSA 2.51 m2   Protime-INR   Result Value Ref Range    Protime 15.4 (H) 9.8 - 12.8 seconds    INR 1.4 (H) 0.9 - 1.1   POCT GLUCOSE   Result Value Ref Range    POCT Glucose 233 (H) 74 - 99 mg/dL     Transthoracic Echo (TTE) Complete    Result Date: 11/20/2023   [facility], [address] [place] [address]               Tel [phone] and Fax [phone] TRANSTHORACIC ECHOCARDIOGRAM REPORT  Patient Name:     JYOTI Nash Physician:   [id number] Debora Jernigan MD Study Date:       11/20/2023          Ordering  Provider:   [id number] SIMBA FRAGA MRN/PID:          [id number]            Fellow: Accession#:       [id number]        Nurse:               Angela Cooper RN Date of           1950 / 73      Sonographer:         Keyonna Klein CS Birth/Age:        years Gender:           M                   Additional Staff: Height:           182.88 cm           Admit Date:          11/18/2023 Weight:           124.74 kg           Admission Status:    Inpatient - STAT BSA:              2.44 m2             Encounter#:          [id number]                                       Department Location: [facility] Blood Pressure: 127 /61 mmHg Study Type:    TRANSTHORACIC ECHO (TTE) COMPLETE Diagnosis/ICD: Unspecified atrial fibrillation-I48.91 Indication:    Afib CPT Code:      Echo Complete w Full Doppler-81473 Patient History: Pertinent History: A-Fib, CAD and As, Elev. trop. Study Detail: The following Echo studies were performed: 2D, M-Mode, Doppler and               color flow. Technically challenging study due to patient lying in               supine position and body habitus. Definity used as a contrast               agent for endocardial border definition. Total contrast used for               this procedure was 1.5 mL via IV push. Unable to obtain LA, RA               janiya. and subcostal view. The patient was asleep.  PHYSICIAN INTERPRETATION: Left Ventricle: The left ventricular systolic function probably normal. However accurate estimated can not be done due to poor image quality. The left ventricular cavity size is normal. There is mildly increased left ventricular posterior wall thickness. Left ventricular diastolic filling was indeterminate. Left Atrium: The left atrium was not well visualized. Right Ventricle: The right ventricle is normal in size. There is normal right ventricular global systolic function. Right Atrium: The right atrium was not well visualized. Aortic Valve: The aortic valve was not well visualized. There  is moderate aortic valve cusp calcification. There is evidence of moderate aortic valve stenosis. There is no evidence of aortic valve regurgitation. The peak instantaneous gradient of the aortic valve is 34.3 mmHg. The mean gradient of the aortic valve is 19.0 mmHg. Mitral Valve: The mitral valve is mild to moderately thickened. There is mild to moderate mitral annular calcification. There is trace mitral valve regurgitation. Tricuspid Valve: The tricuspid valve was not well visualized. There is trace to mild tricuspid regurgitation. The right ventricular systolic pressure is unable to be estimated. Pulmonic Valve: The pulmonic valve is not well visualized. There is trace pulmonic valve regurgitation. Pericardium: There is no pericardial effusion noted. Aorta: The aortic root is normal. In comparison to the previous echocardiogram(s): There are no prior studies on this patient for comparison purposes.  CONCLUSIONS:  1. Left ventricular systolic function probably normal. However accurate estimated can not be done due to poor image quality.  2. Poorly visualized anatomical structures due to suboptimal image quality.  3. Moderate aortic valve stenosis. QUANTITATIVE DATA SUMMARY: 2D MEASUREMENTS:                          Normal Ranges: IVSd:          1.11 cm   (0.6-1.1cm) LVPWd:         1.03 cm   (0.6-1.1cm) LVIDd:         5.07 cm   (3.9-5.9cm) LVIDs:         3.98 cm LV Mass Index: 83.6 g/m2 LV % FS        21.5 % AORTA MEASUREMENTS:                    Normal Ranges: Asc Ao, d: 3.00 cm (2.1-3.4cm) LV DIASTOLIC FUNCTION:                     Normal Ranges: MV Peak E: 0.89 m/s (0.7-1.2 m/s) MV Peak A: 0.88 m/s (0.42-0.7 m/s) E/A Ratio: 1.00     (1.0-2.2) MITRAL VALVE:                 Normal Ranges: MV DT: 180 msec (150-240msec) AORTIC VALVE:                                    Normal Ranges: AoV Vmax:                2.93 m/s  (<=1.7m/s) AoV Peak P.3 mmHg (<20mmHg) AoV Mean P.0 mmHg  (1.7-11.5mmHg) LVOT Max Eddie:            0.75 m/s  (<=1.1m/s) AoV VTI:                 49.70 cm  (18-25cm) LVOT VTI:                15.00 cm LVOT Diameter:           2.30 cm   (1.8-2.4cm) AoV Area, VTI:           1.25 cm2  (2.5-5.5cm2) AoV Area,Vmax:           1.07 cm2  (2.5-4.5cm2) AoV Dimensionless Index: 0.30  RIGHT VENTRICLE: RV Basal 3.48 cm RV Mid   3.95 cm RV Major 8.6 cm TAPSE:   10.7 mm RV s'    0.11 m/s TRICUSPID VALVE/RVSP:                             Normal Ranges: Peak TR Velocity: 2.23 m/s RV Syst Pressure: 22.9 mmHg (< 30mmHg) PULMONIC VALVE:                      Normal Ranges: PV Max Eddie: 0.7 m/s  (0.6-0.9m/s) PV Max P.7 mmHg  [id number] Debora Jernigan MD Electronically signed on 2023 at 12:17:13 PM  ** Final **     CT head wo IV contrast    Result Date: 2023  Interpreted By:  Miya Sutton, STUDY: CT HEAD WO IV CONTRAST;  2023 10:13 am   INDICATION: Signs/Symptoms:continued altered mentation.   COMPARISON: 12:04 a.m. the same day   ACCESSION NUMBER(S): [id number]   ORDERING CLINICIAN: BECCA RAMOS   TECHNIQUE: Unenhanced CT images of the head were obtained.   FINDINGS: Diffuse atrophy with enlargement of the extra-axial spaces and cerebral sulci similar to the prior exam. Moderate ventriculomegaly likely out of proportion to the degree of cerebral atrophy also similar to the prior study. Subtle periventricular white matter hypodensities consistent with small vessel ischemic disease again seen. No acute intracranial hemorrhage or mass-effect. No midline shift. No extra-axial fluid collection.   No focal calvarial lesion.   Visualized paranasal sinuses are clear.       No acute intracranial hemorrhage or mass-effect. Moderate ventriculomegaly likely out of proportion of the degree of cerebral atrophy and raising question of communicating hydrocephalus again noted, similar to 2 days prior.   MACRO: None.   Signed by: Miya Sutton 2023 11:30 AM Dictation workstation:    [id number]    XR chest 1 view    Result Date: 11/19/2023  Interpreted By:  Josias Marie, STUDY: XR CHEST 1 VIEW;  11/19/2023 9:05 pm   INDICATION: Signs/Symptoms:Fever, increased rhonci.   COMPARISON: 11/18/2023   ACCESSION NUMBER(S): [id number]   ORDERING CLINICIAN: KHADIJAH DOMINGUEZ   FINDINGS: There is redemonstration of left ventriculomegaly. The aorta is atherosclerotic. Low lung volumes results in basilar and perihilar bronchovascular crowding. There are nonspecific opacities at the medial lung bases that are at least in part atelectatic at the right lung base due to low lung volumes. There is also patchy airspace disease in the medial left lower lobe that appears ostensibly new from 11/18/2023 CT chest abdomen pelvis.         Probable new airspace disease in the medial left lower lobe compared to prior study that may relate to atelectasis or aspiration pneumonitis. Similarly, there is new perihilar airspace disease that is likely in part atelectatic, but could also be caused by aspiration and postobstructive volume loss. CT chest recommended to further evaluate.   MACRO: None.   Signed by: Josias Marie 11/19/2023 9:32 PM Dictation workstation:   [id number]    US renal complete    Result Date: 11/18/2023  Interpreted By:  Daria Mckinney, STUDY: US RENAL COMPLETE; 11/18/2023 1:55 pm   INDICATION: Concern for hemorrhagic renal cyst.   COMPARISON: CT chest, abdomen and pelvis 11/17/2023   ACCESSION NUMBER(S): [id number]   ORDERING CLINICIAN: CRYSTAL JULIAN   TECHNIQUE: Sonography of the kidneys and urinary bladder was performed.   FINDINGS: Limited examination due to portable technique and patient's immobility.   Right Kidney: *Renal length: 10.2 cm *Parenchyma: Normal parenchymal echogenicity. Normal parenchymal thickness. *Collecting system: No hydronephrosis. *Calculus: No echogenic, shadowing calculus. *Lesion: Previously shown renal cysts on the prior CT not visualized.     Left Kidney: *Renal  length: 10.6 cm *Parenchyma: Normal parenchymal echogenicity. Normal parenchymal thickness. *Collecting system: No hydronephrosis. *Calculus: No echogenic, shadowing calculus. *Lesion: Previously shown renal cyst on the prior CT not visualized.   Bladder: Not visualized.       Limited examination due to portable technique and patient's immobility. Previously shown renal cysts on the prior CT not visualized.   No hydronephrosis.   MACRO: None   Signed by: Daria Mckinney 11/18/2023 2:18 PM Dictation workstation:   [id number]    CT thoracic spine wo IV contrast    Result Date: 11/18/2023  Interpreted By:  Porsche Read, STUDY: CT THORACIC SPINE WO IV CONTRAST; CT LUMBAR SPINE WO IV CONTRAST; 11/18/2023 12:27 am   INDICATION: Signs/Symptoms:fall.   COMPARISON: Same day CT of the chest, abdomen and pelvis.   ACCESSION NUMBER(S): [id number]; [id number]   ORDERING CLINICIAN: ROWAN AVILA   TECHNIQUE: Axial CT images of the thoracic and lumbar spine are obtained. Axial, coronal and sagittal reconstructions are submitted for review.   FINDINGS: THORACIC SPINE:   Thoracic vertebral alignment is maintained, without significant spondylolisthesis.   Multilevel insufficiency endplate changes with small Schmorl's nodes are present in the thoracic spine along the inferior endplates of T6, T7, T8 and T9, without evidence of compression fractures.   Posterior elements of the thoracic spine do not demonstrate any evidence of acute trauma. Well corticated ossific fragments along the spinous processes are T3, T4 and T5 likely represent degenerative changes or sequela of remote injuries.   Mild intervertebral disc height loss is present in the mid and lower thoracic spine.   Although the exam is not optimized to assess the spinal canal, no high-grade spinal canal stenosis is identified.   Paraspinal musculature is unremarkable in appearance.   LUMBAR SPINE:   There are 5 lumbar type non rib-bearing vertebral bodies, with  lowest well-formed intervertebral disc space labeled L5-S1.   No compression fracture is identified in the lumbar spine, although multilevel insufficiency endplate changes are present, with Schmorl's nodes noted along the inferior endplates of L4 and L5 and superior endplates of L5 and S1.   Posterior elements of the lumbar spine do not demonstrate any evidence of acute trauma. No abnormal intra spinous distance widening or displaced transverse or spinous process fractures are identified.   Moderate intervertebral disc height loss is present at L5-S1.   Although the exam is not optimized to assess the spinal canal, no high-grade stenosis is identified, likely mild spinal canal narrowing is present at L3-L4 and L4-L5 due to disc osteophyte complexes and ligamentum flavum thickening.   Paraspinal musculature does not demonstrate any acute abnormalities.       1.  No evidence of acute trauma or high-grade stenosis in the thoracic or lumbar spine. 2. Likely mild spinal canal narrowing is present at the levels of L3-L4 and L4-L5 due to disc osteophyte complexes.   MACRO: None   Signed by: Porsche Read 11/18/2023 1:14 AM Dictation workstation:   [id number]    CT lumbar spine wo IV contrast    Result Date: 11/18/2023  Interpreted By:  Porsche Read, STUDY: CT THORACIC SPINE WO IV CONTRAST; CT LUMBAR SPINE WO IV CONTRAST; 11/18/2023 12:27 am   INDICATION: Signs/Symptoms:fall.   COMPARISON: Same day CT of the chest, abdomen and pelvis.   ACCESSION NUMBER(S): [id number]; [id number]   ORDERING CLINICIAN: ROWAN AVILA   TECHNIQUE: Axial CT images of the thoracic and lumbar spine are obtained. Axial, coronal and sagittal reconstructions are submitted for review.   FINDINGS: THORACIC SPINE:   Thoracic vertebral alignment is maintained, without significant spondylolisthesis.   Multilevel insufficiency endplate changes with small Schmorl's nodes are present in the thoracic spine along the inferior endplates  of T6, T7, T8 and T9, without evidence of compression fractures.   Posterior elements of the thoracic spine do not demonstrate any evidence of acute trauma. Well corticated ossific fragments along the spinous processes are T3, T4 and T5 likely represent degenerative changes or sequela of remote injuries.   Mild intervertebral disc height loss is present in the mid and lower thoracic spine.   Although the exam is not optimized to assess the spinal canal, no high-grade spinal canal stenosis is identified.   Paraspinal musculature is unremarkable in appearance.   LUMBAR SPINE:   There are 5 lumbar type non rib-bearing vertebral bodies, with lowest well-formed intervertebral disc space labeled L5-S1.   No compression fracture is identified in the lumbar spine, although multilevel insufficiency endplate changes are present, with Schmorl's nodes noted along the inferior endplates of L4 and L5 and superior endplates of L5 and S1.   Posterior elements of the lumbar spine do not demonstrate any evidence of acute trauma. No abnormal intra spinous distance widening or displaced transverse or spinous process fractures are identified.   Moderate intervertebral disc height loss is present at L5-S1.   Although the exam is not optimized to assess the spinal canal, no high-grade stenosis is identified, likely mild spinal canal narrowing is present at L3-L4 and L4-L5 due to disc osteophyte complexes and ligamentum flavum thickening.   Paraspinal musculature does not demonstrate any acute abnormalities.       1.  No evidence of acute trauma or high-grade stenosis in the thoracic or lumbar spine. 2. Likely mild spinal canal narrowing is present at the levels of L3-L4 and L4-L5 due to disc osteophyte complexes.   MACRO: None   Signed by: Porsche Read 11/18/2023 1:14 AM Dictation workstation:   [id number]    CT chest abdomen pelvis wo IV contrast    Result Date: 11/18/2023  Interpreted By:  Porsche Read, STUDY: CT  CHEST ABDOMEN PELVIS WO CONTRAST;  11/18/2023 12:27 am   INDICATION: Signs/Symptoms:pain.   COMPARISON: None.   ACCESSION NUMBER(S): [id number]   ORDERING CLINICIAN: ROWAN AVILA   TECHNIQUE: CT of the chest, abdomen and pelvis was performed. Contiguous axial images were obtained at 3 mm slice thickness through the chest, abdomen and pelvis. Coronal and sagittal reconstructions at 3 mm slice thickness were performed.  No intravenous or oral contrast agents were administered.   FINDINGS: Please note that the study is limited without intravenous contrast.   CHEST:     LUNG/PLEURA/LARGE AIRWAYS: Trachea and central airways are patent, without evidence of endobronchial lesion. There is a diverticulum arising from right mainstem bronchus projecting into the subcarinal region.   Within limitations of somewhat motion degraded exam, no pleural effusions, consolidation or pneumothorax is evident.   Several tiny 2-3 mm nodules are present in the lungs bilaterally. No suspicious lung masses are identified.   VESSELS: Aorta and pulmonary arteries are normal caliber.  Moderate vascular calcifications are present in the abdominal aorta. Moderate coronary artery calcifications are present.   HEART: Heart is normal in size with pericardial calcifications present. No pericardial effusion is noted.   MEDIASTINUM AND DUDLEY: No mediastinal, hilar or axillary lymph nodes are present.  The esophagus is unremarkable in appearance. There is no evidence of pneumomediastinum.   CHEST WALL AND LOWER NECK: Soft tissues of the chest wall do not demonstrate any acute abnormalities. No displaced rib fractures or other osseous injuries are identified.   ABDOMEN:   LIVER: Liver is normal in size and within limits of noncontrast exam does not demonstrate any acute abnormalities. No perihepatic free fluid is evident.   BILE DUCTS: No intrahepatic or extrahepatic biliary dilatation is present.   GALLBLADDER: Gallbladder is unremarkable in  appearance.   PANCREAS: No pancreatic ductal dilatation or peripancreatic stranding is evident.   SPLEEN: Spleen is unremarkable in appearance.   ADRENAL GLANDS: Adrenal glands are unremarkable.   KIDNEYS AND URETERS: Kidneys are symmetric in size without evidence of hydronephrosis bilaterally. Several lobulated low-density lesions arise from the right kidney, possibly representing cysts. No ureteral dilatation is evident.   PELVIS:   BLADDER: Bladder is unremarkable in appearance.   REPRODUCTIVE ORGANS: Prostate is enlarged.   BOWEL: Small bowel is nondilated. No inflammatory wall thickening or abnormal dilatation is present in the large bowel. Appendix is unremarkable in appearance in the right lower quadrant.   Scattered diverticula are present in the sigmoid colon without evidence of acute diverticulitis.   VESSELS: There is no aneurysmal dilatation of the abdominal aorta. The IVC is within normal limits. Extensive vascular calcifications are present in the abdominal aorta.   PERITONEUM/RETROPERITONEUM/LYMPH NODES: There is no evidence of free air, free fluid, or thick-walled collections in the abdomen and pelvis. No enlarged lymphadenopathy is evident.   ABDOMINAL WALL: The abdominal wall soft tissues appear normal.   BONES: Please refer to separately dictated CT of the thoracic and lumbar spine for further characterization of spinal findings.   No acute osseous abnormality is identified in the pelvis.       1. No evidence of acute abnormality is present in the chest, abdomen or pelvis. No evidence of acute trauma. 2. Moderate coronary artery calcifications, with pericardial calcifications. Correlate with restrictive pericarditis. 3. Several 2-3 mm solid nodules are present in the upper lungs bilaterally, likely benign by size criteria if there is no history of malignancy, although if patient is has risk factors for lung cancer, yearly screening CT is warranted. 4. Scattered diverticula are present in the  sigmoid colon without evidence of acute diverticulitis. 5. Low-density lobulated lesions in the lower pole of the right kidney are incompletely characterized on current exam, although cyst cystically likely represent cysts. Slightly more hyperdense exophytic lesion arising from the lower pole of the left kidney may represent a proteinaceous or hemorrhagic cyst but is incompletely characterized on study. 6. Additional findings as detailed above.     MACRO: None   Signed by: Porsche Read 11/18/2023 1:05 AM Dictation workstation:   [id number]    CT head wo IV contrast    Result Date: 11/18/2023  Interpreted By:  Porsche Read, STUDY: CT HEAD WO IV CONTRAST; CT CERVICAL SPINE WO IV CONTRAST; CT FACIAL BONES WO IV CONTRAST; CT 3D RECONSTRUCTION;  11/18/2023 12:28 am   INDICATION: Signs/Symptoms:altered mental state; Signs/Symptoms:pain; Signs/Symptoms:contusion; Signs/Symptoms:possible fall contusion.   COMPARISON: None.   ACCESSION NUMBER(S): [id number]; [id number]; [id number]; [id number]   ORDERING CLINICIAN: ROWAN AVILA   TECHNIQUE: Noncontrast axial CT scan of head was performed, with coronal and sagittal reformats provided. The images were reviewed in bone, brain, blood and soft tissue windows.   Thin cut axial CT images through the facial bones were obtained and reconstructed in the coronal and sagittal plane. 3D reconstruction was created on a dedicated workstation and provided for interpretation.   Axial CT images of the cervical spine are obtained. Axial, coronal and sagittal reconstructions are provided for review.   FINDINGS: CT HEAD:   No hyperdense intracranial hemorrhage is evident. There is no mass effect or midline shift.   Gray-white differentiation is intact, without evidence of CT apparent transcortical infarct.   There is somewhat disproportionate enlargement of the supratentorial ventricular system relative to basal cisterns nonspecific finding that may represent  asymmetric central volume loss or normal pressure hydrocephalus. No abnormal extra-axial fluid collections are identified. Basal cisterns are patent.   Scalp soft tissues do not demonstrate any acute abnormalities. Calvarium is unremarkable in appearance without evidence of depressed skull fracture. Mastoid air cells and middle ear cavities are well aerated without evidence of fluid fluid levels.   CT FACIAL BONES:   Mild left supraorbital and periorbital soft tissue swelling and edema is present, without evidence of associated fracture. Bony orbits are intact.   Intraorbital structures are symmetric in appearance without evidence of acute trauma.   Facial bones appear intact without evidence of displaced fracture. Nasal bones are unremarkable in appearance.   Visualized paranasal sinuses are well aerated without evidence of fluid fluid levels.   Soft tissues overlying the face and skull base do not demonstrate any acute abnormality.   There are numerous missing teeth, with dental cavities and periapical lucencies present in the remaining lower incisors. Temporomandibular joints are intact. No trauma to the oral cavity is identified.   Buccal soft tissues do not demonstrate any acute abnormalities. No fluid collections or soft tissue gas is identified. Parotid and submandibular glands are unremarkable in appearance.   CT C-SPINE:   There is straightening of normal lordotic curvature of the cervical spine, with a 1-2 mm anterolisthesis of C4 on C5.   No compression fractures are identified in the cervical spine, although mild insufficiency endplate changes with associated Schmorl's nodes are present along the inferior endplate of C6 and superior endplate of C7.   Posterior elements of the cervical spine do not demonstrate any evidence of acute trauma. No abnormal intra spinous distance widening or displaced transverse or spinous process fractures are identified.   Craniocervical junction is intact. Facet joints are  preserved with mild degenerate facet osteoarthropathy present at several levels, most pronounced at C3-C4 and C4-C5 on the left.   Mild intervertebral disc height loss is present at C4-C5 and C6-C7.   No high-grade spinal canal stenosis is identified. Mild spinal canal narrowing is possible at the level of C3-C4 due to mild disc bulge and ligamentum flavum thickening with slight effacement of the cervical spinal cord.   Mild neural foraminal narrowing is present at C4-C5 on the left due to endplate spurring and hypertrophic facet changes.   Prevertebral and paraspinal soft tissues do not demonstrate any acute abnormalities.       CT HEAD: 1. No evidence of hemorrhage, depressed skull fracture, or other acute intracranial trauma. 2. Disproportionate enlargement of the supratentorial ventricular system relative to basal cisterns and sulci, nonspecific finding that may represent asymmetric central volume loss or normal pressure hydrocephalus.   CT FACIAL BONES: 1. Left periorbital and supraorbital soft tissue swelling, without evidence of displaced orbital or facial bone fracture. 2. Large dental cavities and periapical lucencies are present in the few remaining lower incisors. Correlate with dental exam.   CT C-SPINE: 1. No evidence of acute trauma to the cervical spine.   MACRO: None   Signed by: Porsche Read 11/18/2023 12:49 AM Dictation workstation:   [id number]    CT cervical spine wo IV contrast    Result Date: 11/18/2023  Interpreted By:  Porsche Read, STUDY: CT HEAD WO IV CONTRAST; CT CERVICAL SPINE WO IV CONTRAST; CT FACIAL BONES WO IV CONTRAST; CT 3D RECONSTRUCTION;  11/18/2023 12:28 am   INDICATION: Signs/Symptoms:altered mental state; Signs/Symptoms:pain; Signs/Symptoms:contusion; Signs/Symptoms:possible fall contusion.   COMPARISON: None.   ACCESSION NUMBER(S): [id number]; [id number]; [id number]; [id number]   ORDERING CLINICIAN: ROWAN AVILA   TECHNIQUE: Noncontrast axial CT  scan of head was performed, with coronal and sagittal reformats provided. The images were reviewed in bone, brain, blood and soft tissue windows.   Thin cut axial CT images through the facial bones were obtained and reconstructed in the coronal and sagittal plane. 3D reconstruction was created on a dedicated workstation and provided for interpretation.   Axial CT images of the cervical spine are obtained. Axial, coronal and sagittal reconstructions are provided for review.   FINDINGS: CT HEAD:   No hyperdense intracranial hemorrhage is evident. There is no mass effect or midline shift.   Gray-white differentiation is intact, without evidence of CT apparent transcortical infarct.   There is somewhat disproportionate enlargement of the supratentorial ventricular system relative to basal cisterns nonspecific finding that may represent asymmetric central volume loss or normal pressure hydrocephalus. No abnormal extra-axial fluid collections are identified. Basal cisterns are patent.   Scalp soft tissues do not demonstrate any acute abnormalities. Calvarium is unremarkable in appearance without evidence of depressed skull fracture. Mastoid air cells and middle ear cavities are well aerated without evidence of fluid fluid levels.   CT FACIAL BONES:   Mild left supraorbital and periorbital soft tissue swelling and edema is present, without evidence of associated fracture. Bony orbits are intact.   Intraorbital structures are symmetric in appearance without evidence of acute trauma.   Facial bones appear intact without evidence of displaced fracture. Nasal bones are unremarkable in appearance.   Visualized paranasal sinuses are well aerated without evidence of fluid fluid levels.   Soft tissues overlying the face and skull base do not demonstrate any acute abnormality.   There are numerous missing teeth, with dental cavities and periapical lucencies present in the remaining lower incisors. Temporomandibular joints are  intact. No trauma to the oral cavity is identified.   Buccal soft tissues do not demonstrate any acute abnormalities. No fluid collections or soft tissue gas is identified. Parotid and submandibular glands are unremarkable in appearance.   CT C-SPINE:   There is straightening of normal lordotic curvature of the cervical spine, with a 1-2 mm anterolisthesis of C4 on C5.   No compression fractures are identified in the cervical spine, although mild insufficiency endplate changes with associated Schmorl's nodes are present along the inferior endplate of C6 and superior endplate of C7.   Posterior elements of the cervical spine do not demonstrate any evidence of acute trauma. No abnormal intra spinous distance widening or displaced transverse or spinous process fractures are identified.   Craniocervical junction is intact. Facet joints are preserved with mild degenerate facet osteoarthropathy present at several levels, most pronounced at C3-C4 and C4-C5 on the left.   Mild intervertebral disc height loss is present at C4-C5 and C6-C7.   No high-grade spinal canal stenosis is identified. Mild spinal canal narrowing is possible at the level of C3-C4 due to mild disc bulge and ligamentum flavum thickening with slight effacement of the cervical spinal cord.   Mild neural foraminal narrowing is present at C4-C5 on the left due to endplate spurring and hypertrophic facet changes.   Prevertebral and paraspinal soft tissues do not demonstrate any acute abnormalities.       CT HEAD: 1. No evidence of hemorrhage, depressed skull fracture, or other acute intracranial trauma. 2. Disproportionate enlargement of the supratentorial ventricular system relative to basal cisterns and sulci, nonspecific finding that may represent asymmetric central volume loss or normal pressure hydrocephalus.   CT FACIAL BONES: 1. Left periorbital and supraorbital soft tissue swelling, without evidence of displaced orbital or facial bone fracture. 2.  Large dental cavities and periapical lucencies are present in the few remaining lower incisors. Correlate with dental exam.   CT C-SPINE: 1. No evidence of acute trauma to the cervical spine.   MACRO: None   Signed by: Porsche Read 11/18/2023 12:49 AM Dictation workstation:   [id number]    CT maxillofacial bones wo IV contrast    Result Date: 11/18/2023  Interpreted By:  Porsche Read, STUDY: CT HEAD WO IV CONTRAST; CT CERVICAL SPINE WO IV CONTRAST; CT FACIAL BONES WO IV CONTRAST; CT 3D RECONSTRUCTION;  11/18/2023 12:28 am   INDICATION: Signs/Symptoms:altered mental state; Signs/Symptoms:pain; Signs/Symptoms:contusion; Signs/Symptoms:possible fall contusion.   COMPARISON: None.   ACCESSION NUMBER(S): [id number]; [id number]; [id number]; [id number]   ORDERING CLINICIAN: ROWAN AVILA   TECHNIQUE: Noncontrast axial CT scan of head was performed, with coronal and sagittal reformats provided. The images were reviewed in bone, brain, blood and soft tissue windows.   Thin cut axial CT images through the facial bones were obtained and reconstructed in the coronal and sagittal plane. 3D reconstruction was created on a dedicated workstation and provided for interpretation.   Axial CT images of the cervical spine are obtained. Axial, coronal and sagittal reconstructions are provided for review.   FINDINGS: CT HEAD:   No hyperdense intracranial hemorrhage is evident. There is no mass effect or midline shift.   Gray-white differentiation is intact, without evidence of CT apparent transcortical infarct.   There is somewhat disproportionate enlargement of the supratentorial ventricular system relative to basal cisterns nonspecific finding that may represent asymmetric central volume loss or normal pressure hydrocephalus. No abnormal extra-axial fluid collections are identified. Basal cisterns are patent.   Scalp soft tissues do not demonstrate any acute abnormalities. Calvarium is unremarkable in  appearance without evidence of depressed skull fracture. Mastoid air cells and middle ear cavities are well aerated without evidence of fluid fluid levels.   CT FACIAL BONES:   Mild left supraorbital and periorbital soft tissue swelling and edema is present, without evidence of associated fracture. Bony orbits are intact.   Intraorbital structures are symmetric in appearance without evidence of acute trauma.   Facial bones appear intact without evidence of displaced fracture. Nasal bones are unremarkable in appearance.   Visualized paranasal sinuses are well aerated without evidence of fluid fluid levels.   Soft tissues overlying the face and skull base do not demonstrate any acute abnormality.   There are numerous missing teeth, with dental cavities and periapical lucencies present in the remaining lower incisors. Temporomandibular joints are intact. No trauma to the oral cavity is identified.   Buccal soft tissues do not demonstrate any acute abnormalities. No fluid collections or soft tissue gas is identified. Parotid and submandibular glands are unremarkable in appearance.   CT C-SPINE:   There is straightening of normal lordotic curvature of the cervical spine, with a 1-2 mm anterolisthesis of C4 on C5.   No compression fractures are identified in the cervical spine, although mild insufficiency endplate changes with associated Schmorl's nodes are present along the inferior endplate of C6 and superior endplate of C7.   Posterior elements of the cervical spine do not demonstrate any evidence of acute trauma. No abnormal intra spinous distance widening or displaced transverse or spinous process fractures are identified.   Craniocervical junction is intact. Facet joints are preserved with mild degenerate facet osteoarthropathy present at several levels, most pronounced at C3-C4 and C4-C5 on the left.   Mild intervertebral disc height loss is present at C4-C5 and C6-C7.   No high-grade spinal canal stenosis is  identified. Mild spinal canal narrowing is possible at the level of C3-C4 due to mild disc bulge and ligamentum flavum thickening with slight effacement of the cervical spinal cord.   Mild neural foraminal narrowing is present at C4-C5 on the left due to endplate spurring and hypertrophic facet changes.   Prevertebral and paraspinal soft tissues do not demonstrate any acute abnormalities.       CT HEAD: 1. No evidence of hemorrhage, depressed skull fracture, or other acute intracranial trauma. 2. Disproportionate enlargement of the supratentorial ventricular system relative to basal cisterns and sulci, nonspecific finding that may represent asymmetric central volume loss or normal pressure hydrocephalus.   CT FACIAL BONES: 1. Left periorbital and supraorbital soft tissue swelling, without evidence of displaced orbital or facial bone fracture. 2. Large dental cavities and periapical lucencies are present in the few remaining lower incisors. Correlate with dental exam.   CT C-SPINE: 1. No evidence of acute trauma to the cervical spine.   MACRO: None   Signed by: Porsche Read 11/18/2023 12:49 AM Dictation workstation:   [id number]    CT 3D reconstruction    Result Date: 11/18/2023  Interpreted By:  Porsche Read, STUDY: CT HEAD WO IV CONTRAST; CT CERVICAL SPINE WO IV CONTRAST; CT FACIAL BONES WO IV CONTRAST; CT 3D RECONSTRUCTION;  11/18/2023 12:28 am   INDICATION: Signs/Symptoms:altered mental state; Signs/Symptoms:pain; Signs/Symptoms:contusion; Signs/Symptoms:possible fall contusion.   COMPARISON: None.   ACCESSION NUMBER(S): [id number]; [id number]; [id number]; [id number]   ORDERING CLINICIAN: ROWAN AVILA   TECHNIQUE: Noncontrast axial CT scan of head was performed, with coronal and sagittal reformats provided. The images were reviewed in bone, brain, blood and soft tissue windows.   Thin cut axial CT images through the facial bones were obtained and reconstructed in the coronal and  sagittal plane. 3D reconstruction was created on a dedicated workstation and provided for interpretation.   Axial CT images of the cervical spine are obtained. Axial, coronal and sagittal reconstructions are provided for review.   FINDINGS: CT HEAD:   No hyperdense intracranial hemorrhage is evident. There is no mass effect or midline shift.   Gray-white differentiation is intact, without evidence of CT apparent transcortical infarct.   There is somewhat disproportionate enlargement of the supratentorial ventricular system relative to basal cisterns nonspecific finding that may represent asymmetric central volume loss or normal pressure hydrocephalus. No abnormal extra-axial fluid collections are identified. Basal cisterns are patent.   Scalp soft tissues do not demonstrate any acute abnormalities. Calvarium is unremarkable in appearance without evidence of depressed skull fracture. Mastoid air cells and middle ear cavities are well aerated without evidence of fluid fluid levels.   CT FACIAL BONES:   Mild left supraorbital and periorbital soft tissue swelling and edema is present, without evidence of associated fracture. Bony orbits are intact.   Intraorbital structures are symmetric in appearance without evidence of acute trauma.   Facial bones appear intact without evidence of displaced fracture. Nasal bones are unremarkable in appearance.   Visualized paranasal sinuses are well aerated without evidence of fluid fluid levels.   Soft tissues overlying the face and skull base do not demonstrate any acute abnormality.   There are numerous missing teeth, with dental cavities and periapical lucencies present in the remaining lower incisors. Temporomandibular joints are intact. No trauma to the oral cavity is identified.   Buccal soft tissues do not demonstrate any acute abnormalities. No fluid collections or soft tissue gas is identified. Parotid and submandibular glands are unremarkable in appearance.   CT C-SPINE:    There is straightening of normal lordotic curvature of the cervical spine, with a 1-2 mm anterolisthesis of C4 on C5.   No compression fractures are identified in the cervical spine, although mild insufficiency endplate changes with associated Schmorl's nodes are present along the inferior endplate of C6 and superior endplate of C7.   Posterior elements of the cervical spine do not demonstrate any evidence of acute trauma. No abnormal intra spinous distance widening or displaced transverse or spinous process fractures are identified.   Craniocervical junction is intact. Facet joints are preserved with mild degenerate facet osteoarthropathy present at several levels, most pronounced at C3-C4 and C4-C5 on the left.   Mild intervertebral disc height loss is present at C4-C5 and C6-C7.   No high-grade spinal canal stenosis is identified. Mild spinal canal narrowing is possible at the level of C3-C4 due to mild disc bulge and ligamentum flavum thickening with slight effacement of the cervical spinal cord.   Mild neural foraminal narrowing is present at C4-C5 on the left due to endplate spurring and hypertrophic facet changes.   Prevertebral and paraspinal soft tissues do not demonstrate any acute abnormalities.       CT HEAD: 1. No evidence of hemorrhage, depressed skull fracture, or other acute intracranial trauma. 2. Disproportionate enlargement of the supratentorial ventricular system relative to basal cisterns and sulci, nonspecific finding that may represent asymmetric central volume loss or normal pressure hydrocephalus.   CT FACIAL BONES: 1. Left periorbital and supraorbital soft tissue swelling, without evidence of displaced orbital or facial bone fracture. 2. Large dental cavities and periapical lucencies are present in the few remaining lower incisors. Correlate with dental exam.   CT C-SPINE: 1. No evidence of acute trauma to the cervical spine.   MACRO: None   Signed by: Porsche Read 11/18/2023 12:49  AM Dictation workstation:   [id number]       Assessment/Plan   Néstor Castro is a 73 y.o. malewith past medical history of HF with Improved EF, Atrial fibrillation on Eliquis, non obstructive coronary artery disease, aortic stenosis, sleep apnea admitted for DKA and Altered mental status.     Neurology  #Acute Encephalopathy  - DKA now resolved it is uncertain why patient is still lethargic  - VBG done shows no evidence of CO2 retention with values of 7.43/29/110   - CTH at the time of presentation was not concerning for intracranial lesion and or CVA   - Continue to monitor for change, if no improvement in 24hrs, may re-scan brain   - CT head repeated on 11/20: no intracranial hemorrhage or mass-effect. Moderate ventriculomegaly raising question for communicating hydrocephalus     Pulmonology   #Lung Nodules  - Outpatient Ct in 12 months     Cardiology  #Heart Failure with Improved EF  - Last echocardiogram from 12/2020 showed EF of 55  - Hold Lasix, lisinopril and Jardiance     #Atrial Fibrillation with Rapid Ventricular Response  - Hold PO Metoprolol 25 twice daily and Flecainide 100 twice daily while patient is unconscious  - Hold Anticoagulant due to Multiple bruises after fall and possible hemorrhagic renal cyst on CT  - discontinued IV metoprolol 5mg q6h (11/20)  - Patient still not controlled, if HR persisting >120's, will start dilt gtt   - TTE ordered: Poor image quality. Left ventricular systolic function probably normal. Moderate aortic valve stenosis.     #HLD  #Non Obstructive CAD  -Continue aspirin and atorvastatin     Gastroenterology   No acute issues     Renal   #Acute Kidney Injury   -Pre renal in nature in nature   -Baseline Creatine< 1     # proteinaceous or hemorrhagic cyst  - noted on CT, Renal US exam is limited without cysts observed  - Urology consulted, appreciate recs     #Rhabdomyolysis  -likely due to fall  - CK 1200  - On D5 nacl 0.45 100 ml/hr     #Hypernatremia  - corrected  sodium of 153mEq (11/20)  - ordered bolus 1L LR to be administered over 1 hour  - changed maintenance fluids to D5W 100cc/hr until sodium is corrected  - recheck sodium at 6pm     Endocrinology  #Diabetic Ketoacidosis  #Uncontrolled Type 2 DM  -Patient last A1C was 11.7 on 03/06  -Home regimen: Lantus 50 unit twice daily, humalog 10 unit before meal, and empagliflozin 10 daily  -Gap closed, BG sitting around 250-260, awake but still NPO  - Discontinue DKA protocol and orders  - initiate intensive insulin order: insulin regular 100 unit/100mL (1 unit/mL) in 0.9% NaCl infusion  - Hold Empagliflozin     Hematology   -No acute issues     Infectious Disease  #Cellulitis   #Leukocytosis   -Likely reactive in the setting of DKA   - Patient noted to have a warm and edematous left leg  - likely in the setting of cellulitis   - deescalated abx from vanc and zosyn to IV cefazolin 1g q8h   - Follow up Bcx: preliminary is normal     Musculoskeletal  #Fall  #Multiple Bruises  - History of multiple falls  - PT/OT consulted  - Monitor bruises for now     Access: piv  Diet: NPO  GI Prophylaxis: none  DVT: SCD and LMWH  Antibiotics: IV cefazolin 1g q8h  Code: Full code  Disposition: Patient admitted for DKA and acute encephalopathy. Patient is very lethargic, unrelated to DKA      Shannan Morales DO  PGY-1

## 2023-11-20 NOTE — CARE PLAN
The patient's goals for the shift include      The clinical goals for the shift include Pt  will have blood glucose <250 by the  end of this shift..

## 2023-11-20 NOTE — CONSULTS
"Consults    Reason For Consult  Renal masses    History Of Present Illness  Néstor Castro is a 73 y.o. male presenting with generalized complaints.  He was diagnosed with DKA, and also possibly aspiration pneumonia.  CT scan done revealed bilateral renal masses.  The patient is a poor historian, no family here today.  He remains in the ICU on insulin drip.       Past Medical History  He has no past medical history on file.    Surgical History  He has no past surgical history on file.     Social History  He reports that he has quit smoking. His smoking use included cigarettes. He does not have any smokeless tobacco history on file. He reports that he does not currently use alcohol. He reports that he does not use drugs.    Family History  No family history on file.     Allergies  Patient has no known allergies.    Review of Systems  N/A  due to mental status       Physical Exam  General: Awake, non-verbal, no distress  Chest: Breathing comfortably, respirations unlabored  Abdomen: Obese, soft, ND, NT, no hernias  Morfin -urine clear       Last Recorded Vitals  Blood pressure 160/72, pulse 91, temperature 37.7 °C (99.9 °F), resp. rate 20, height 1.829 m (6' 0.01\"), weight 124 kg (273 lb 13 oz), SpO2 98 %.    Relevant Results  Results for orders placed or performed during the hospital encounter of 11/18/23 (from the past 24 hour(s))   Renal function panel   Result Value Ref Range    Glucose 241 (H) 74 - 99 mg/dL    Sodium 147 (H) 136 - 145 mmol/L    Potassium 4.2 3.5 - 5.3 mmol/L    Chloride 120 (H) 98 - 107 mmol/L    Bicarbonate 19 (L) 21 - 32 mmol/L    Anion Gap 12 10 - 20 mmol/L    Urea Nitrogen 66 (H) 6 - 23 mg/dL    Creatinine 1.53 (H) 0.50 - 1.30 mg/dL    eGFR 48 (L) >60 mL/min/1.73m*2    Calcium 9.3 8.6 - 10.3 mg/dL    Phosphorus 2.3 (L) 2.5 - 4.9 mg/dL    Albumin 3.2 (L) 3.4 - 5.0 g/dL   POCT GLUCOSE   Result Value Ref Range    POCT Glucose 222 (H) 74 - 99 mg/dL   Blood Gas Arterial Full Panel   Result Value " Ref Range    POCT pH, Arterial 7.46 (H) 7.38 - 7.42 pH    POCT pCO2, Arterial 29 (L) 38 - 42 mm Hg    POCT pO2, Arterial 73 (L) 85 - 95 mm Hg    POCT SO2, Arterial 97 94 - 100 %    POCT Oxy Hemoglobin, Arterial 94.2 94.0 - 98.0 %    POCT Hematocrit Calculated, Arterial 37.0 (L) 41.0 - 52.0 %    POCT Sodium, Arterial 147 (H) 136 - 145 mmol/L    POCT Potassium, Arterial 4.0 3.5 - 5.3 mmol/L    POCT Chloride, Arterial 121 (H) 98 - 107 mmol/L    POCT Ionized Calcium, Arterial 1.41 (H) 1.10 - 1.33 mmol/L    POCT Glucose, Arterial 277 (H) 74 - 99 mg/dL    POCT Lactate, Arterial 1.8 0.4 - 2.0 mmol/L    POCT Base Excess, Arterial -2.2 (L) -2.0 - 3.0 mmol/L    POCT HCO3 Calculated, Arterial 20.6 (L) 22.0 - 26.0 mmol/L    POCT Hemoglobin, Arterial 12.4 (L) 13.5 - 17.5 g/dL    POCT Anion Gap, Arterial 9 (L) 10 - 25 mmo/L    Patient Temperature      FiO2 28 %   POCT GLUCOSE   Result Value Ref Range    POCT Glucose 240 (H) 74 - 99 mg/dL   POCT GLUCOSE   Result Value Ref Range    POCT Glucose 255 (H) 74 - 99 mg/dL   POCT GLUCOSE   Result Value Ref Range    POCT Glucose 223 (H) 74 - 99 mg/dL   POCT GLUCOSE   Result Value Ref Range    POCT Glucose 267 (H) 74 - 99 mg/dL   POCT GLUCOSE   Result Value Ref Range    POCT Glucose 269 (H) 74 - 99 mg/dL   Vancomycin   Result Value Ref Range    Vancomycin 13.2 5.0 - 20.0 ug/mL   Sedimentation rate, automated   Result Value Ref Range    Sedimentation Rate 70 (H) 0 - 20 mm/h   C-reactive protein   Result Value Ref Range    C-Reactive Protein 18.14 (H) <1.00 mg/dL   Lactate   Result Value Ref Range    Lactate 1.6 0.4 - 2.0 mmol/L   Green Top   Result Value Ref Range    Extra Tube Hold for add-ons.    POCT GLUCOSE   Result Value Ref Range    POCT Glucose 271 (H) 74 - 99 mg/dL   POCT GLUCOSE   Result Value Ref Range    POCT Glucose 255 (H) 74 - 99 mg/dL   CBC   Result Value Ref Range    WBC 11.3 4.4 - 11.3 x10*3/uL    nRBC 0.0 0.0 - 0.0 /100 WBCs    RBC 4.65 4.50 - 5.90 x10*6/uL    Hemoglobin  11.5 (L) 13.5 - 17.5 g/dL    Hematocrit 38.1 (L) 41.0 - 52.0 %    MCV 82 80 - 100 fL    MCH 24.7 (L) 26.0 - 34.0 pg    MCHC 30.2 (L) 32.0 - 36.0 g/dL    RDW 15.4 (H) 11.5 - 14.5 %    Platelets 205 150 - 450 x10*3/uL   Magnesium   Result Value Ref Range    Magnesium 2.16 1.60 - 2.40 mg/dL   Renal Function Panel   Result Value Ref Range    Glucose 273 (H) 74 - 99 mg/dL    Sodium 150 (H) 136 - 145 mmol/L    Potassium 3.8 3.5 - 5.3 mmol/L    Chloride 119 (H) 98 - 107 mmol/L    Bicarbonate 23 21 - 32 mmol/L    Anion Gap 12 10 - 20 mmol/L    Urea Nitrogen 40 (H) 6 - 23 mg/dL    Creatinine 1.14 0.50 - 1.30 mg/dL    eGFR 68 >60 mL/min/1.73m*2    Calcium 9.3 8.6 - 10.3 mg/dL    Phosphorus 1.9 (L) 2.5 - 4.9 mg/dL    Albumin 3.0 (L) 3.4 - 5.0 g/dL          Assessment/Plan     Renal masses.    CT reviewed, lesions may be hyperdense cysts.  Ultrasound is non-diagnostic.  Recommend outpatient follow up with CT renal mass protocol.      No intervention required at this time.       I spent 35 minutes in the professional and overall care of this patient.

## 2023-11-20 NOTE — PROGRESS NOTES
Occupational Therapy                 Therapy Communication Note    Patient Name: Néstor Castro  MRN: [id number]  Today's Date: 11/20/2023     Discipline: Occupational Therapy    Missed Visit Reason: Missed Visit Reason: Patient placed on medical hold (Discussed with RN, pt condition remains the dame as previous attempts. Not able to maintain alertness in order to participate in therapy eval. Communicated with PT)    Missed Time: Attempt, 10:46

## 2023-11-20 NOTE — CONSULTS
Wound Care Consult     Visit Date: 11/20/2023      Patient Name: Néstor Castro         MRN: [id number]           YOB: 1950     Reason for Consult: Wound recommendations.        Wound History: Patient found down in home, home alone for days, history of visit to wound clinic in [place], additional wounds from fall.     Pertinent Labs:   Albumin   Date Value Ref Range Status   11/20/2023 2.7 (L) 3.4 - 5.0 g/dL Final       Wound Assessment:  Wound 11/18/23 Other (comment) Sternum Mid (Active)   Wound Image   11/20/23 0251   Site Assessment Yellow;Red 11/19/23 2000   Zamzam-Wound Assessment Pink 11/19/23 2000   Drainage Description None 11/19/23 2000   Dressing Other (Comment) 11/20/23 0855       Wound 11/18/23 Other (comment) Abdomen Lower;Medial (Active)   Site Assessment Clean;Dry 11/20/23 0855   Zamzam-Wound Assessment Intact 11/20/23 0253   State of Healing Early/partial granulation 11/19/23 2000   Dressing Status Clean;Dry 11/20/23 0855       Wound 11/18/23 Pressure Injury Pedal Right (Active)   Wound Image   11/20/23 0256   Site Assessment Black 11/20/23 0855   Zamzam-Wound Assessment Pink 11/19/23 2000   Pressure Injury Stage U 11/20/23 0855   Dressing Open to air 11/20/23 0855       Wound 11/18/23 Pressure Injury Sacrum Medial (Active)   Wound Image   11/20/23 0256   Site Assessment Bleeding 11/20/23 0855   Pressure Injury Stage 2 11/20/23 0855       Wound 11/18/23 Pressure Injury Dorsal foot;Left (Active)   Wound Image   11/20/23 0255       Wound Team Summary Assessment: Patient seen with multiple family members at bedside.  Daughter came up during assessment and gave information.  History reviewed in record and with daughter, patient poor historian.  Waffle boots on, headley catheter in place, dressings removed, Wounds cleansed with NS  Head - large 3 x 4 cm abrasions with edema, bilateral forehead and cheek.  Bilateral arms- multiple small raised scabs, approximate 1 x 2 cm.  Lower chest- 6 x 23  cm black eschar 80%, slough 20%, drainage serous small amount.  Abdominal pannus- approx. 0.2 x 30 cm shallow fissure with small 0.2 cm area of slough.  Right knee- 4 x 3 cm abrasion, red with edema  BLE- lateral sides of calves bilaterally large scabs- right closed, left side open wound in scab with satellite scabs.  Left posterior heel- circular DTPI, purple, non blanching.  BL toes- blackened, soft areas at tip of toes, nails thick yellow, black, long, twisted, unkempt.     Wound Team Plan: Obtained orders from Dr. Ma at bedside.  Care provided.  Eleni SANCHEZ assisted.  Daughter aided with history.  Consult for podiatry- wounds on legs and feet.  Consult for surgery- lower chest wound eschar.  Interdry Ag to protect from skin to skin and heal wound.  Cavilon no sting to protect periwound skin and strengthen integrity of dermis.  Bacitracin- to protect from infection and moist wound healing.  Adaptic with Xeroforrm to protect form adherence and moist wound healing.  Hydrocolloid- protect from incontinence and moist wound healing.  True tabatha boots- off loading and comfort.    Educated patient and family rt wound care and prevention. Please message if any needs arise.       Shira Hamilton RN  11/20/2023  6:18 PM

## 2023-11-20 NOTE — PROGRESS NOTES
11/20/23 1200   Discharge Planning   Living Arrangements Other (Comment)  (Unable to determine at this time)   Support Systems Other (Comment)  (Unable to determine at this time)   Assistance Needed The patient is a poor historian, no family here today.  He remains in the ICU on insulin drip. Call placed to number listed as patient’s spouse and voicemail states number belongs to [employer]. Call placed to number listed for patient’s daughter with no answer and no ability to leave a voicemail.   Type of Residence Private residence   Who is requesting discharge planning? Provider   Home or Post Acute Services Other (Comment)  (TBD)   Type of Post Acute Facility Services Skilled nursing;Rehab   Patient expects to be discharged to: TBD   Does the patient need discharge transport arranged? Yes   RoundTrip coordination needed? Yes   Has discharge transport been arranged? No

## 2023-11-20 NOTE — PROGRESS NOTES
Vancomycin Dosing by Pharmacy- Cessation of Therapy    Consult to pharmacy for vancomycin dosing has been discontinued by the prescriber, pharmacy will sign off at this time.    Please call pharmacy if there are further questions or re-enter a consult if vancomycin is resumed.     Domi Reed, [facility]

## 2023-11-20 NOTE — PROGRESS NOTES
Physical Therapy                 Therapy Communication Note    Patient Name: Néstor Castro  MRN: [id number]  Today's Date: 11/20/2023     Discipline: Physical Therapy    Missed Visit Reason: Missed Visit Reason: Patient placed on medical hold (Per communication with OTR who spoke with RN, RN stating Pt is not alert enough or medically stable to participate)    Missed Time: Attempt    Comment:

## 2023-11-20 NOTE — PROGRESS NOTES
Speech-Language Pathology    Inpatient Speech-Language Pathology Clinical Swallow Evaluation       Patient Name: Néstor Castro  MRN: [id number]  : 1950  Today's Date: 23  Time Calculation  Start Time:   Stop Time: 1533  Time Calculation (min): 21 min         Recommendations:  Risk for Aspiration: Yes   Solid Diet Recommendations : Pureed/extremely thick  (IDDSI Level 4)   Liquid Diet Recommendations: Nectar thick/mildly thick (IDDS Level 2)   Compensatory Swallowing Strategies: Upright 90 degrees as possible for all oral intake, Full supervision with meals     Medication Administration Recommendations: Whole, With Pureed, Crushed      Assessment:  Consistencies Trialed: Consistencies Trialed: Thin (IDDSI Level 0) - Straw, Nectar thick/mildly thick (IDDSI Level 2) - Straw, Pureed/extremely thick (IDDSI Level 4)   Oral Motor: Impaired Function, Unable to Complete      Dentition:  (Missing many teeth. Dentures are missing per daughter.)   Assessment Results: Poor mastication, cough with thin. Likely aspiration.   Prognosis: Good   Medical Staff Made Aware: Yes     Baseline Assessment:  Respiratory Status: Oxygen via nasal cannula   Patient Positioning: Upright in Bed     Plan:  SLP Plan: Skilled SLP Skilled speech therapy for dysphagia treatment is warranted in order to provide training and instruction regarding the use of compensatory swallow strategies, oropharyngeal strengthening exercises, and pt/caregiver education in order to reduce risk of aspiration, dehydration and malnutrition.  SLP Frequency: 2x per week   Duration: Current admission       Discussed POC: Patient, Nursing, Physician   Discussed Risks/Benefits: Yes   Patient/Caregiver Agreeable: Yes   SLP Discharge Recommendations: unable to determine at this time, refer to subsequent notes    Goals:  Pt will tolerate the least restrictive diet without signs or symptoms of dysphagia on 95% of observed trials.   Patient will participate in  ongoing evaluation of swallowing functioning to ensure he is on the safest and least restrictive diet level.     Subjective   Awake, limited verbalizations. Voice was clear and strong when he spoke. Speech was intelligible.       General Visit Information:  Pt. Admitted  11/18/23  Past medical history:  Néstor Castro is a 73 y.o. malewith past medical history of HF with Improved EF, Atrial fibrillation on Eliquis, non obstructive coronary artery disease, aortic stenosis, sleep apnea admitted for DKA and Altered mental status.  Living Environment: Home     Ordering Physician: Dr. Byrnes     Reason for Referral: Determine readiness for oral intake.      Current Diet : NPO   Prior to Session Communication: Bedside nurse   Pain:  Pain Assessment  Pain Assessment: CPOT (Critical Care Pain Observation Tool)  Pain Score: 0 - No pain     Inpatient Education:  Pt and his daughter were educated on safe swallow strategies, role of SLP, S/S of aspiration to be aware of, risks of aspiration, current swallow function, recommended diet  Patient needs further instruction

## 2023-11-21 NOTE — PROGRESS NOTES
Jyoti Castro is a 73 y.o. male on day 3 of admission presenting with DKA, type 2, not at goal (CMS/HCC).      Subjective   Patient is reclined in bed upon room entry. HDS. NAEO. He states that he is feeling pretty good today. He was advanced to a full liquid diet overnight. He is globally weak; 1/5 or 2/5 strength in all extremities. He denies chest pain, sob, headaches, and abdominal pain.     Objective     Last Recorded Vitals  /57   Pulse 73   Temp 36.6 °C (97.9 °F) (Temporal)   Resp 24   Wt 127 kg (279 lb 1.6 oz)   SpO2 95%   Intake/Output last 3 Shifts:    Intake/Output Summary (Last 24 hours) at 11/21/2023 1225  Last data filed at 11/21/2023 0638  Gross per 24 hour   Intake 5633.99 ml   Output 2150 ml   Net 3483.99 ml       Admission Weight  Weight: 121 kg (266 lb 12.1 oz) (11/18/23 0553)    Daily Weight  11/21/23 : 127 kg (279 lb 1.6 oz)    Image Results  Transthoracic Echo (TTE) Complete     [facility], [address] [place] [address]                Tel [phone] and Fax [phone]    TRANSTHORACIC ECHOCARDIOGRAM REPORT       Patient Name:     JYOTI CASTRO   Reading Physician:   [id number] Jernigan MD  Study Date:       11/20/2023          Ordering Provider:   [id number] SIMBA FRAGA  MRN/PID:          [id number]            Fellow:  Accession#:       [id number]        Nurse:               Angela Cooper RN  Date of           1950 / 73      Sonographer:         Keyonna Klein RDCS  Birth/Age:        years  Gender:           M                   Additional Staff:  Height:           182.88 cm           Admit Date:          11/18/2023  Weight:           124.74 kg           Admission Status:    Inpatient - STAT  BSA:              2.44 m2             Encounter#:          [id number]                                        Department Location: [facility]  Blood Pressure: 127 /61 mmHg    Study Type:     TRANSTHORACIC ECHO (TTE) COMPLETE  Diagnosis/ICD: Unspecified atrial fibrillation-I48.91  Indication:    Afib  CPT Code:      Echo Complete w Full Doppler-38989    Patient History:  Pertinent History: A-Fib, CAD and As, Elev. trop.    Study Detail: The following Echo studies were performed: 2D, M-Mode, Doppler and                color flow. Technically challenging study due to patient lying in                supine position and body habitus. Definity used as a contrast                agent for endocardial border definition. Total contrast used for                this procedure was 1.5 mL via IV push. Unable to obtain LA, RA                janiya. and subcostal view. The patient was asleep.       PHYSICIAN INTERPRETATION:  Left Ventricle: The left ventricular systolic function probably normal. However accurate estimated can not be done due to poor image quality. The left ventricular cavity size is normal. There is mildly increased left ventricular posterior wall thickness. Left ventricular diastolic filling was indeterminate.  Left Atrium: The left atrium was not well visualized.  Right Ventricle: The right ventricle is normal in size. There is normal right ventricular global systolic function.  Right Atrium: The right atrium was not well visualized.  Aortic Valve: The aortic valve was not well visualized. There is moderate aortic valve cusp calcification. There is evidence of moderate aortic valve stenosis.  There is no evidence of aortic valve regurgitation. The peak instantaneous gradient of the aortic valve is 34.3 mmHg. The mean gradient of the aortic valve is 19.0 mmHg.  Mitral Valve: The mitral valve is mild to moderately thickened. There is mild to moderate mitral annular calcification. There is trace mitral valve regurgitation.  Tricuspid Valve: The tricuspid valve was not well visualized. There is trace to mild tricuspid regurgitation. The right ventricular systolic pressure is unable to be  estimated.  Pulmonic Valve: The pulmonic valve is not well visualized. There is trace pulmonic valve regurgitation.  Pericardium: There is no pericardial effusion noted.  Aorta: The aortic root is normal.  In comparison to the previous echocardiogram(s): There are no prior studies on this patient for comparison purposes.       CONCLUSIONS:   1. Left ventricular systolic function probably normal. However accurate estimated can not be done due to poor image quality.   2. Poorly visualized anatomical structures due to suboptimal image quality.   3. Moderate aortic valve stenosis.    QUANTITATIVE DATA SUMMARY:  2D MEASUREMENTS:                           Normal Ranges:  IVSd:          1.11 cm   (0.6-1.1cm)  LVPWd:         1.03 cm   (0.6-1.1cm)  LVIDd:         5.07 cm   (3.9-5.9cm)  LVIDs:         3.98 cm  LV Mass Index: 83.6 g/m2  LV % FS        21.5 %    AORTA MEASUREMENTS:                     Normal Ranges:  Asc Ao, d: 3.00 cm (2.1-3.4cm)    LV DIASTOLIC FUNCTION:                      Normal Ranges:  MV Peak E: 0.89 m/s (0.7-1.2 m/s)  MV Peak A: 0.88 m/s (0.42-0.7 m/s)  E/A Ratio: 1.00     (1.0-2.2)    MITRAL VALVE:                  Normal Ranges:  MV DT: 180 msec (150-240msec)    AORTIC VALVE:                                     Normal Ranges:  AoV Vmax:                2.93 m/s  (<=1.7m/s)  AoV Peak P.3 mmHg (<20mmHg)  AoV Mean P.0 mmHg (1.7-11.5mmHg)  LVOT Max Eddie:            0.75 m/s  (<=1.1m/s)  AoV VTI:                 49.70 cm  (18-25cm)  LVOT VTI:                15.00 cm  LVOT Diameter:           2.30 cm   (1.8-2.4cm)  AoV Area, VTI:           1.25 cm2  (2.5-5.5cm2)  AoV Area,Vmax:           1.07 cm2  (2.5-4.5cm2)  AoV Dimensionless Index: 0.30       RIGHT VENTRICLE:  RV Basal 3.48 cm  RV Mid   3.95 cm  RV Major 8.6 cm  TAPSE:   10.7 mm  RV s'    0.11 m/s    TRICUSPID VALVE/RVSP:                              Normal Ranges:  Peak TR Velocity: 2.23 m/s  RV Syst Pressure: 22.9 mmHg  (< 30mmHg)    PULMONIC VALVE:                       Normal Ranges:  PV Max Eddie: 0.7 m/s  (0.6-0.9m/s)  PV Max P.7 mmHg       [id number] Debora Jernigan MD  Electronically signed on 2023 at 12:17:13 PM       ** Final **  CT head wo IV contrast  Narrative: Interpreted By:  Miya Sutton,   STUDY:  CT HEAD WO IV CONTRAST;  2023 10:13 am      INDICATION:  Signs/Symptoms:continued altered mentation.      COMPARISON:  12:04 a.m. the same day      ACCESSION NUMBER(S):  [id number]      ORDERING CLINICIAN:  BECCA RAMOS      TECHNIQUE:  Unenhanced CT images of the head were obtained.      FINDINGS:  Diffuse atrophy with enlargement of the extra-axial spaces and  cerebral sulci similar to the prior exam. Moderate ventriculomegaly  likely out of proportion to the degree of cerebral atrophy also  similar to the prior study. Subtle periventricular white matter  hypodensities consistent with small vessel ischemic disease again  seen. No acute intracranial hemorrhage or mass-effect. No midline  shift. No extra-axial fluid collection.      No focal calvarial lesion.      Visualized paranasal sinuses are clear.      Impression: No acute intracranial hemorrhage or mass-effect. Moderate  ventriculomegaly likely out of proportion of the degree of cerebral  atrophy and raising question of communicating hydrocephalus again  noted, similar to 2 days prior.      MACRO:  None.      Signed by: Miya Sutton 2023 11:30 AM  Dictation workstation:   [id number]      Physical Exam  Constitutional:       Appearance: Normal appearance.   Cardiovascular:      Rate and Rhythm: Normal rate and regular rhythm.      Pulses: Normal pulses.      Heart sounds: Normal heart sounds.   Pulmonary:      Effort: Pulmonary effort is normal.      Breath sounds: Normal breath sounds.      Comments: On RA  Abdominal:      General: Abdomen is flat. Bowel sounds are normal.      Palpations: Abdomen is soft.   Genitourinary:     Comments: Navjot  catheter present  Skin:     Findings: Lesion present.      Comments: Several lesions from fall several days ago; abdominal wound evaluated by gen surg, intervention not recommended at this time   Neurological:      Mental Status: He is alert.      GCS: GCS eye subscore is 4. GCS verbal subscore is 5. GCS motor subscore is 6.      Cranial Nerves: Cranial nerves 2-12 are intact.      Sensory: Sensation is intact.      Motor: Weakness present.      Comments: A&Ox2: to person and place  RUE 2/5, LUE 2/5, RLE 1/5, LLE 1/5       Relevant Results  Scheduled medications  apixaban, 5 mg, oral, BID  [Held by provider] aspirin, 81 mg, oral, q24h  atorvastatin, 40 mg, oral, Daily  bacitracin, , Topical, TID  bisacodyl, 10 mg, rectal, Once  ceFAZolin, 1 g, intravenous, q8h  flecainide, 100 mg, oral, BID  insulin glargine, 50 Units, subcutaneous, BID  insulin lispro, 0-20 Units, subcutaneous, TID with meals  insulin lispro, 10 Units, subcutaneous, TID with meals  lactated Ringer's, 1,000 mL/hr, intravenous, Once  metoprolol tartrate, 25 mg, oral, BID  nystatin, 5 mL, Swish & Spit, 4x daily  potassium phosphate, 21 mmol, intravenous, Once      Continuous medications     PRN medications  PRN medications: acetaminophen, dextrose 10 % in water (D10W), dextrose, glucagon, insulin regular, oxygen    Results for orders placed or performed during the hospital encounter of 11/18/23 (from the past 24 hour(s))   POCT GLUCOSE   Result Value Ref Range    POCT Glucose 288 (H) 74 - 99 mg/dL   POCT GLUCOSE   Result Value Ref Range    POCT Glucose 255 (H) 74 - 99 mg/dL   POCT GLUCOSE   Result Value Ref Range    POCT Glucose 228 (H) 74 - 99 mg/dL   Heparin Assay, UFH   Result Value Ref Range    Heparin Unfractionated 0.3 See Comment Below for Therapeutic Ranges IU/mL   Comprehensive metabolic panel   Result Value Ref Range    Glucose 291 (H) 74 - 99 mg/dL    Sodium 154 (H) 136 - 145 mmol/L    Potassium 3.7 3.5 - 5.3 mmol/L    Chloride 117 (H) 98 -  107 mmol/L    Bicarbonate 26 21 - 32 mmol/L    Anion Gap 15 10 - 20 mmol/L    Urea Nitrogen 32 (H) 6 - 23 mg/dL    Creatinine 1.02 0.50 - 1.30 mg/dL    eGFR 78 >60 mL/min/1.73m*2    Calcium 9.3 8.6 - 10.3 mg/dL    Albumin 2.7 (L) 3.4 - 5.0 g/dL    Alkaline Phosphatase 81 33 - 136 U/L    Total Protein 6.0 (L) 6.4 - 8.2 g/dL    AST 21 9 - 39 U/L    Bilirubin, Total 0.5 0.0 - 1.2 mg/dL    ALT 17 10 - 52 U/L   Phosphorus   Result Value Ref Range    Phosphorus 1.9 (L) 2.5 - 4.9 mg/dL   POCT GLUCOSE   Result Value Ref Range    POCT Glucose 267 (H) 74 - 99 mg/dL   POCT GLUCOSE   Result Value Ref Range    POCT Glucose 239 (H) 74 - 99 mg/dL   POCT GLUCOSE   Result Value Ref Range    POCT Glucose 283 (H) 74 - 99 mg/dL   Heparin Assay, UFH   Result Value Ref Range    Heparin Unfractionated 0.4 See Comment Below for Therapeutic Ranges IU/mL   POCT GLUCOSE   Result Value Ref Range    POCT Glucose 207 (H) 74 - 99 mg/dL   POCT GLUCOSE   Result Value Ref Range    POCT Glucose 177 (H) 74 - 99 mg/dL   POCT GLUCOSE   Result Value Ref Range    POCT Glucose 187 (H) 74 - 99 mg/dL   POCT GLUCOSE   Result Value Ref Range    POCT Glucose 165 (H) 74 - 99 mg/dL   POCT GLUCOSE   Result Value Ref Range    POCT Glucose 161 (H) 74 - 99 mg/dL   Heparin Assay, UFH   Result Value Ref Range    Heparin Unfractionated 0.5 See Comment Below for Therapeutic Ranges IU/mL   CBC   Result Value Ref Range    WBC 9.9 4.4 - 11.3 x10*3/uL    nRBC 0.0 0.0 - 0.0 /100 WBCs    RBC 4.50 4.50 - 5.90 x10*6/uL    Hemoglobin 10.9 (L) 13.5 - 17.5 g/dL    Hematocrit 36.5 (L) 41.0 - 52.0 %    MCV 81 80 - 100 fL    MCH 24.2 (L) 26.0 - 34.0 pg    MCHC 29.9 (L) 32.0 - 36.0 g/dL    RDW 15.0 (H) 11.5 - 14.5 %    Platelets 190 150 - 450 x10*3/uL   Renal function panel   Result Value Ref Range    Glucose 219 (H) 74 - 99 mg/dL    Sodium 149 (H) 136 - 145 mmol/L    Potassium 3.6 3.5 - 5.3 mmol/L    Chloride 116 (H) 98 - 107 mmol/L    Bicarbonate 23 21 - 32 mmol/L    Anion Gap 14 10  - 20 mmol/L    Urea Nitrogen 26 (H) 6 - 23 mg/dL    Creatinine 0.97 0.50 - 1.30 mg/dL    eGFR 83 >60 mL/min/1.73m*2    Calcium 8.7 8.6 - 10.3 mg/dL    Phosphorus 1.7 (L) 2.5 - 4.9 mg/dL    Albumin 2.7 (L) 3.4 - 5.0 g/dL   Magnesium   Result Value Ref Range    Magnesium 1.75 1.60 - 2.40 mg/dL   Vancomycin   Result Value Ref Range    Vancomycin 7.7 5.0 - 20.0 ug/mL   POCT GLUCOSE   Result Value Ref Range    POCT Glucose 222 (H) 74 - 99 mg/dL   Comprehensive metabolic panel   Result Value Ref Range    Glucose 301 (H) 74 - 99 mg/dL    Sodium 147 (H) 136 - 145 mmol/L    Potassium 3.9 3.5 - 5.3 mmol/L    Chloride 115 (H) 98 - 107 mmol/L    Bicarbonate 25 21 - 32 mmol/L    Anion Gap 11 10 - 20 mmol/L    Urea Nitrogen 25 (H) 6 - 23 mg/dL    Creatinine 0.99 0.50 - 1.30 mg/dL    eGFR 81 >60 mL/min/1.73m*2    Calcium 8.6 8.6 - 10.3 mg/dL    Albumin 2.7 (L) 3.4 - 5.0 g/dL    Alkaline Phosphatase 91 33 - 136 U/L    Total Protein 5.7 (L) 6.4 - 8.2 g/dL    AST 40 (H) 9 - 39 U/L    Bilirubin, Total 0.6 0.0 - 1.2 mg/dL    ALT 29 10 - 52 U/L   POCT GLUCOSE   Result Value Ref Range    POCT Glucose 296 (H) 74 - 99 mg/dL     Transthoracic Echo (TTE) Complete    Result Date: 11/20/2023   [facility], [address] [address]               Tel [phone] and Fax [phone] TRANSTHORACIC ECHOCARDIOGRAM REPORT  Patient Name:     JYOTI Nash Physician:   [id number] Debora Jernigan MD Study Date:       11/20/2023          Ordering Provider:   [id number] SIMBA FRAGA MRN/PID:          [id number]            Fellow: Accession#:       [id number]        Nurse:               Angela Cooper RN Date of           1950 / 73      Sonographer:         Keyonna Klein [facility] Birth/Age:        years Gender:           M                   Additional Staff: Height:           182.88 cm           Admit Date:          11/18/2023 Weight:           124.74 kg            Admission Status:    Inpatient - STAT BSA:              2.44 m2             Encounter#:          [id number]                                       Department Location: [facility] ICU Blood Pressure: 127 /61 mmHg Study Type:    TRANSTHORACIC ECHO (TTE) COMPLETE Diagnosis/ICD: Unspecified atrial fibrillation-I48.91 Indication:    Afib CPT Code:      Echo Complete w Full Doppler-[id number] Patient History: Pertinent History: A-Fib, CAD and As, Elev. trop. Study Detail: The following Echo studies were performed: 2D, M-Mode, Doppler and               color flow. Technically challenging study due to patient lying in               supine position and body habitus. Definity used as a contrast               agent for endocardial border definition. Total contrast used for               this procedure was 1.5 mL via IV push. Unable to obtain LA, RA               janiya. and subcostal view. The patient was asleep.  PHYSICIAN INTERPRETATION: Left Ventricle: The left ventricular systolic function probably normal. However accurate estimated can not be done due to poor image quality. The left ventricular cavity size is normal. There is mildly increased left ventricular posterior wall thickness. Left ventricular diastolic filling was indeterminate. Left Atrium: The left atrium was not well visualized. Right Ventricle: The right ventricle is normal in size. There is normal right ventricular global systolic function. Right Atrium: The right atrium was not well visualized. Aortic Valve: The aortic valve was not well visualized. There is moderate aortic valve cusp calcification. There is evidence of moderate aortic valve stenosis. There is no evidence of aortic valve regurgitation. The peak instantaneous gradient of the aortic valve is 34.3 mmHg. The mean gradient of the aortic valve is 19.0 mmHg. Mitral Valve: The mitral valve is mild to moderately thickened. There is mild to moderate mitral annular calcification. There is trace mitral valve  regurgitation. Tricuspid Valve: The tricuspid valve was not well visualized. There is trace to mild tricuspid regurgitation. The right ventricular systolic pressure is unable to be estimated. Pulmonic Valve: The pulmonic valve is not well visualized. There is trace pulmonic valve regurgitation. Pericardium: There is no pericardial effusion noted. Aorta: The aortic root is normal. In comparison to the previous echocardiogram(s): There are no prior studies on this patient for comparison purposes.  CONCLUSIONS:  1. Left ventricular systolic function probably normal. However accurate estimated can not be done due to poor image quality.  2. Poorly visualized anatomical structures due to suboptimal image quality.  3. Moderate aortic valve stenosis. QUANTITATIVE DATA SUMMARY: 2D MEASUREMENTS:                          Normal Ranges: IVSd:          1.11 cm   (0.6-1.1cm) LVPWd:         1.03 cm   (0.6-1.1cm) LVIDd:         5.07 cm   (3.9-5.9cm) LVIDs:         3.98 cm LV Mass Index: 83.6 g/m2 LV % FS        21.5 % AORTA MEASUREMENTS:                    Normal Ranges: Asc Ao, d: 3.00 cm (2.1-3.4cm) LV DIASTOLIC FUNCTION:                     Normal Ranges: MV Peak E: 0.89 m/s (0.7-1.2 m/s) MV Peak A: 0.88 m/s (0.42-0.7 m/s) E/A Ratio: 1.00     (1.0-2.2) MITRAL VALVE:                 Normal Ranges: MV DT: 180 msec (150-240msec) AORTIC VALVE:                                    Normal Ranges: AoV Vmax:                2.93 m/s  (<=1.7m/s) AoV Peak P.3 mmHg (<20mmHg) AoV Mean P.0 mmHg (1.7-11.5mmHg) LVOT Max Eddie:            0.75 m/s  (<=1.1m/s) AoV VTI:                 49.70 cm  (18-25cm) LVOT VTI:                15.00 cm LVOT Diameter:           2.30 cm   (1.8-2.4cm) AoV Area, VTI:           1.25 cm2  (2.5-5.5cm2) AoV Area,Vmax:           1.07 cm2  (2.5-4.5cm2) AoV Dimensionless Index: 0.30  RIGHT VENTRICLE: RV Basal 3.48 cm RV Mid   3.95 cm RV Major 8.6 cm TAPSE:   10.7 mm RV s'    0.11 m/s  TRICUSPID VALVE/RVSP:                             Normal Ranges: Peak TR Velocity: 2.23 m/s RV Syst Pressure: 22.9 mmHg (< 30mmHg) PULMONIC VALVE:                      Normal Ranges: PV Max Eddie: 0.7 m/s  (0.6-0.9m/s) PV Max P.7 mmHg  [id number] Debora Jernigan MD Electronically signed on 2023 at 12:17:13 PM  ** Final **     CT head wo IV contrast    Result Date: 2023  Interpreted By:  Miya Sutton, STUDY: CT HEAD WO IV CONTRAST;  2023 10:13 am   INDICATION: Signs/Symptoms:continued altered mentation.   COMPARISON: 12:04 a.m. the same day   ACCESSION NUMBER(S): [id number]   ORDERING CLINICIAN: BECCA RAMOS   TECHNIQUE: Unenhanced CT images of the head were obtained.   FINDINGS: Diffuse atrophy with enlargement of the extra-axial spaces and cerebral sulci similar to the prior exam. Moderate ventriculomegaly likely out of proportion to the degree of cerebral atrophy also similar to the prior study. Subtle periventricular white matter hypodensities consistent with small vessel ischemic disease again seen. No acute intracranial hemorrhage or mass-effect. No midline shift. No extra-axial fluid collection.   No focal calvarial lesion.   Visualized paranasal sinuses are clear.       No acute intracranial hemorrhage or mass-effect. Moderate ventriculomegaly likely out of proportion of the degree of cerebral atrophy and raising question of communicating hydrocephalus again noted, similar to 2 days prior.   MACRO: None.   Signed by: Miya Sutton 2023 11:30 AM Dictation workstation:   [id number]    XR chest 1 view    Result Date: 2023  Interpreted By:  Josias Marie, STUDY: XR CHEST 1 VIEW;  2023 9:05 pm   INDICATION: Signs/Symptoms:Fever, increased rhonci.   COMPARISON: 2023   ACCESSION NUMBER(S): [id number]   ORDERING CLINICIAN: KHADIJAH DOMINGUEZ   FINDINGS: There is redemonstration of left ventriculomegaly. The aorta is atherosclerotic. Low lung volumes results in basilar and  perihilar bronchovascular crowding. There are nonspecific opacities at the medial lung bases that are at least in part atelectatic at the right lung base due to low lung volumes. There is also patchy airspace disease in the medial left lower lobe that appears ostensibly new from 11/18/2023 CT chest abdomen pelvis.         Probable new airspace disease in the medial left lower lobe compared to prior study that may relate to atelectasis or aspiration pneumonitis. Similarly, there is new perihilar airspace disease that is likely in part atelectatic, but could also be caused by aspiration and postobstructive volume loss. CT chest recommended to further evaluate.   MACRO: None.   Signed by: Josias Marie 11/19/2023 9:32 PM Dictation workstation:   [id number]    US renal complete    Result Date: 11/18/2023  Interpreted By:  Daria Mckinney, STUDY: US RENAL COMPLETE; 11/18/2023 1:55 pm   INDICATION: Concern for hemorrhagic renal cyst.   COMPARISON: CT chest, abdomen and pelvis 11/17/2023   ACCESSION NUMBER(S): [id number]   ORDERING CLINICIAN: CRYSTAL JULIAN   TECHNIQUE: Sonography of the kidneys and urinary bladder was performed.   FINDINGS: Limited examination due to portable technique and patient's immobility.   Right Kidney: *Renal length: 10.2 cm *Parenchyma: Normal parenchymal echogenicity. Normal parenchymal thickness. *Collecting system: No hydronephrosis. *Calculus: No echogenic, shadowing calculus. *Lesion: Previously shown renal cysts on the prior CT not visualized.     Left Kidney: *Renal length: 10.6 cm *Parenchyma: Normal parenchymal echogenicity. Normal parenchymal thickness. *Collecting system: No hydronephrosis. *Calculus: No echogenic, shadowing calculus. *Lesion: Previously shown renal cyst on the prior CT not visualized.   Bladder: Not visualized.       Limited examination due to portable technique and patient's immobility. Previously shown renal cysts on the prior CT not visualized.   No  hydronephrosis.   MACRO: None   Signed by: Daria Mckinney 11/18/2023 2:18 PM Dictation workstation:   [id number]    CT thoracic spine wo IV contrast    Result Date: 11/18/2023  Interpreted By:  Porsche Read, STUDY: CT THORACIC SPINE WO IV CONTRAST; CT LUMBAR SPINE WO IV CONTRAST; 11/18/2023 12:27 am   INDICATION: Signs/Symptoms:fall.   COMPARISON: Same day CT of the chest, abdomen and pelvis.   ACCESSION NUMBER(S): [id number]; [id number]   ORDERING CLINICIAN: ROWAN AVILA   TECHNIQUE: Axial CT images of the thoracic and lumbar spine are obtained. Axial, coronal and sagittal reconstructions are submitted for review.   FINDINGS: THORACIC SPINE:   Thoracic vertebral alignment is maintained, without significant spondylolisthesis.   Multilevel insufficiency endplate changes with small Schmorl's nodes are present in the thoracic spine along the inferior endplates of T6, T7, T8 and T9, without evidence of compression fractures.   Posterior elements of the thoracic spine do not demonstrate any evidence of acute trauma. Well corticated ossific fragments along the spinous processes are T3, T4 and T5 likely represent degenerative changes or sequela of remote injuries.   Mild intervertebral disc height loss is present in the mid and lower thoracic spine.   Although the exam is not optimized to assess the spinal canal, no high-grade spinal canal stenosis is identified.   Paraspinal musculature is unremarkable in appearance.   LUMBAR SPINE:   There are 5 lumbar type non rib-bearing vertebral bodies, with lowest well-formed intervertebral disc space labeled L5-S1.   No compression fracture is identified in the lumbar spine, although multilevel insufficiency endplate changes are present, with Schmorl's nodes noted along the inferior endplates of L4 and L5 and superior endplates of L5 and S1.   Posterior elements of the lumbar spine do not demonstrate any evidence of acute trauma. No abnormal intra spinous distance  widening or displaced transverse or spinous process fractures are identified.   Moderate intervertebral disc height loss is present at L5-S1.   Although the exam is not optimized to assess the spinal canal, no high-grade stenosis is identified, likely mild spinal canal narrowing is present at L3-L4 and L4-L5 due to disc osteophyte complexes and ligamentum flavum thickening.   Paraspinal musculature does not demonstrate any acute abnormalities.       1.  No evidence of acute trauma or high-grade stenosis in the thoracic or lumbar spine. 2. Likely mild spinal canal narrowing is present at the levels of L3-L4 and L4-L5 due to disc osteophyte complexes.   MACRO: None   Signed by: Porsche Read 11/18/2023 1:14 AM Dictation workstation:   [id number]    CT lumbar spine wo IV contrast    Result Date: 11/18/2023  Interpreted By:  Porsche Read, STUDY: CT THORACIC SPINE WO IV CONTRAST; CT LUMBAR SPINE WO IV CONTRAST; 11/18/2023 12:27 am   INDICATION: Signs/Symptoms:fall.   COMPARISON: Same day CT of the chest, abdomen and pelvis.   ACCESSION NUMBER(S): [id number]; [id number]   ORDERING CLINICIAN: ROWAN AVILA   TECHNIQUE: Axial CT images of the thoracic and lumbar spine are obtained. Axial, coronal and sagittal reconstructions are submitted for review.   FINDINGS: THORACIC SPINE:   Thoracic vertebral alignment is maintained, without significant spondylolisthesis.   Multilevel insufficiency endplate changes with small Schmorl's nodes are present in the thoracic spine along the inferior endplates of T6, T7, T8 and T9, without evidence of compression fractures.   Posterior elements of the thoracic spine do not demonstrate any evidence of acute trauma. Well corticated ossific fragments along the spinous processes are T3, T4 and T5 likely represent degenerative changes or sequela of remote injuries.   Mild intervertebral disc height loss is present in the mid and lower thoracic spine.   Although the exam is  not optimized to assess the spinal canal, no high-grade spinal canal stenosis is identified.   Paraspinal musculature is unremarkable in appearance.   LUMBAR SPINE:   There are 5 lumbar type non rib-bearing vertebral bodies, with lowest well-formed intervertebral disc space labeled L5-S1.   No compression fracture is identified in the lumbar spine, although multilevel insufficiency endplate changes are present, with Schmorl's nodes noted along the inferior endplates of L4 and L5 and superior endplates of L5 and S1.   Posterior elements of the lumbar spine do not demonstrate any evidence of acute trauma. No abnormal intra spinous distance widening or displaced transverse or spinous process fractures are identified.   Moderate intervertebral disc height loss is present at L5-S1.   Although the exam is not optimized to assess the spinal canal, no high-grade stenosis is identified, likely mild spinal canal narrowing is present at L3-L4 and L4-L5 due to disc osteophyte complexes and ligamentum flavum thickening.   Paraspinal musculature does not demonstrate any acute abnormalities.       1.  No evidence of acute trauma or high-grade stenosis in the thoracic or lumbar spine. 2. Likely mild spinal canal narrowing is present at the levels of L3-L4 and L4-L5 due to disc osteophyte complexes.   MACRO: None   Signed by: Porsche Read 11/18/2023 1:14 AM Dictation workstation:   [id number]    CT chest abdomen pelvis wo IV contrast    Result Date: 11/18/2023  Interpreted By:  Porsche Read, STUDY: CT CHEST ABDOMEN PELVIS WO CONTRAST;  11/18/2023 12:27 am   INDICATION: Signs/Symptoms:pain.   COMPARISON: None.   ACCESSION NUMBER(S): [id number]   ORDERING CLINICIAN: ROWAN AVILA   TECHNIQUE: CT of the chest, abdomen and pelvis was performed. Contiguous axial images were obtained at 3 mm slice thickness through the chest, abdomen and pelvis. Coronal and sagittal reconstructions at 3 mm slice thickness were  performed.  No intravenous or oral contrast agents were administered.   FINDINGS: Please note that the study is limited without intravenous contrast.   CHEST:     LUNG/PLEURA/LARGE AIRWAYS: Trachea and central airways are patent, without evidence of endobronchial lesion. There is a diverticulum arising from right mainstem bronchus projecting into the subcarinal region.   Within limitations of somewhat motion degraded exam, no pleural effusions, consolidation or pneumothorax is evident.   Several tiny 2-3 mm nodules are present in the lungs bilaterally. No suspicious lung masses are identified.   VESSELS: Aorta and pulmonary arteries are normal caliber.  Moderate vascular calcifications are present in the abdominal aorta. Moderate coronary artery calcifications are present.   HEART: Heart is normal in size with pericardial calcifications present. No pericardial effusion is noted.   MEDIASTINUM AND DUDLEY: No mediastinal, hilar or axillary lymph nodes are present.  The esophagus is unremarkable in appearance. There is no evidence of pneumomediastinum.   CHEST WALL AND LOWER NECK: Soft tissues of the chest wall do not demonstrate any acute abnormalities. No displaced rib fractures or other osseous injuries are identified.   ABDOMEN:   LIVER: Liver is normal in size and within limits of noncontrast exam does not demonstrate any acute abnormalities. No perihepatic free fluid is evident.   BILE DUCTS: No intrahepatic or extrahepatic biliary dilatation is present.   GALLBLADDER: Gallbladder is unremarkable in appearance.   PANCREAS: No pancreatic ductal dilatation or peripancreatic stranding is evident.   SPLEEN: Spleen is unremarkable in appearance.   ADRENAL GLANDS: Adrenal glands are unremarkable.   KIDNEYS AND URETERS: Kidneys are symmetric in size without evidence of hydronephrosis bilaterally. Several lobulated low-density lesions arise from the right kidney, possibly representing cysts. No ureteral dilatation is  evident.   PELVIS:   BLADDER: Bladder is unremarkable in appearance.   REPRODUCTIVE ORGANS: Prostate is enlarged.   BOWEL: Small bowel is nondilated. No inflammatory wall thickening or abnormal dilatation is present in the large bowel. Appendix is unremarkable in appearance in the right lower quadrant.   Scattered diverticula are present in the sigmoid colon without evidence of acute diverticulitis.   VESSELS: There is no aneurysmal dilatation of the abdominal aorta. The IVC is within normal limits. Extensive vascular calcifications are present in the abdominal aorta.   PERITONEUM/RETROPERITONEUM/LYMPH NODES: There is no evidence of free air, free fluid, or thick-walled collections in the abdomen and pelvis. No enlarged lymphadenopathy is evident.   ABDOMINAL WALL: The abdominal wall soft tissues appear normal.   BONES: Please refer to separately dictated CT of the thoracic and lumbar spine for further characterization of spinal findings.   No acute osseous abnormality is identified in the pelvis.       1. No evidence of acute abnormality is present in the chest, abdomen or pelvis. No evidence of acute trauma. 2. Moderate coronary artery calcifications, with pericardial calcifications. Correlate with restrictive pericarditis. 3. Several 2-3 mm solid nodules are present in the upper lungs bilaterally, likely benign by size criteria if there is no history of malignancy, although if patient is has risk factors for lung cancer, yearly screening CT is warranted. 4. Scattered diverticula are present in the sigmoid colon without evidence of acute diverticulitis. 5. Low-density lobulated lesions in the lower pole of the right kidney are incompletely characterized on current exam, although cyst cystically likely represent cysts. Slightly more hyperdense exophytic lesion arising from the lower pole of the left kidney may represent a proteinaceous or hemorrhagic cyst but is incompletely characterized on study. 6. Additional  findings as detailed above.     MACRO: None   Signed by: Porsche Read 11/18/2023 1:05 AM Dictation workstation:   [id number]    CT head wo IV contrast    Result Date: 11/18/2023  Interpreted By:  Porsche [employer], STUDY: CT HEAD WO IV CONTRAST; CT CERVICAL SPINE WO IV CONTRAST; CT FACIAL BONES WO IV CONTRAST; CT 3D RECONSTRUCTION;  11/18/2023 12:28 am   INDICATION: Signs/Symptoms:altered mental state; Signs/Symptoms:pain; Signs/Symptoms:contusion; Signs/Symptoms:possible fall contusion.   COMPARISON: None.   ACCESSION NUMBER(S): [id number]; [id number]; [id number]; [id number]   ORDERING CLINICIAN: ROWAN AVILA   TECHNIQUE: Noncontrast axial CT scan of head was performed, with coronal and sagittal reformats provided. The images were reviewed in bone, brain, blood and soft tissue windows.   Thin cut axial CT images through the facial bones were obtained and reconstructed in the coronal and sagittal plane. 3D reconstruction was created on a dedicated workstation and provided for interpretation.   Axial CT images of the cervical spine are obtained. Axial, coronal and sagittal reconstructions are provided for review.   FINDINGS: CT HEAD:   No hyperdense intracranial hemorrhage is evident. There is no mass effect or midline shift.   Gray-white differentiation is intact, without evidence of CT apparent transcortical infarct.   There is somewhat disproportionate enlargement of the supratentorial ventricular system relative to basal cisterns nonspecific finding that may represent asymmetric central volume loss or normal pressure hydrocephalus. No abnormal extra-axial fluid collections are identified. Basal cisterns are patent.   Scalp soft tissues do not demonstrate any acute abnormalities. Calvarium is unremarkable in appearance without evidence of depressed skull fracture. Mastoid air cells and middle ear cavities are well aerated without evidence of fluid fluid levels.   CT FACIAL BONES:   Mild left  supraorbital and periorbital soft tissue swelling and edema is present, without evidence of associated fracture. Bony orbits are intact.   Intraorbital structures are symmetric in appearance without evidence of acute trauma.   Facial bones appear intact without evidence of displaced fracture. Nasal bones are unremarkable in appearance.   Visualized paranasal sinuses are well aerated without evidence of fluid fluid levels.   Soft tissues overlying the face and skull base do not demonstrate any acute abnormality.   There are numerous missing teeth, with dental cavities and periapical lucencies present in the remaining lower incisors. Temporomandibular joints are intact. No trauma to the oral cavity is identified.   Buccal soft tissues do not demonstrate any acute abnormalities. No fluid collections or soft tissue gas is identified. Parotid and submandibular glands are unremarkable in appearance.   CT C-SPINE:   There is straightening of normal lordotic curvature of the cervical spine, with a 1-2 mm anterolisthesis of C4 on C5.   No compression fractures are identified in the cervical spine, although mild insufficiency endplate changes with associated Schmorl's nodes are present along the inferior endplate of C6 and superior endplate of C7.   Posterior elements of the cervical spine do not demonstrate any evidence of acute trauma. No abnormal intra spinous distance widening or displaced transverse or spinous process fractures are identified.   Craniocervical junction is intact. Facet joints are preserved with mild degenerate facet osteoarthropathy present at several levels, most pronounced at C3-C4 and C4-C5 on the left.   Mild intervertebral disc height loss is present at C4-C5 and C6-C7.   No high-grade spinal canal stenosis is identified. Mild spinal canal narrowing is possible at the level of C3-C4 due to mild disc bulge and ligamentum flavum thickening with slight effacement of the cervical spinal cord.   Mild  neural foraminal narrowing is present at C4-C5 on the left due to endplate spurring and hypertrophic facet changes.   Prevertebral and paraspinal soft tissues do not demonstrate any acute abnormalities.       CT HEAD: 1. No evidence of hemorrhage, depressed skull fracture, or other acute intracranial trauma. 2. Disproportionate enlargement of the supratentorial ventricular system relative to basal cisterns and sulci, nonspecific finding that may represent asymmetric central volume loss or normal pressure hydrocephalus.   CT FACIAL BONES: 1. Left periorbital and supraorbital soft tissue swelling, without evidence of displaced orbital or facial bone fracture. 2. Large dental cavities and periapical lucencies are present in the few remaining lower incisors. Correlate with dental exam.   CT C-SPINE: 1. No evidence of acute trauma to the cervical spine.   MACRO: None   Signed by: Porsche Read 11/18/2023 12:49 AM Dictation workstation:   [id number]    CT cervical spine wo IV contrast    Result Date: 11/18/2023  Interpreted By:  Porsche Read, STUDY: CT HEAD WO IV CONTRAST; CT CERVICAL SPINE WO IV CONTRAST; CT FACIAL BONES WO IV CONTRAST; CT 3D RECONSTRUCTION;  11/18/2023 12:28 am   INDICATION: Signs/Symptoms:altered mental state; Signs/Symptoms:pain; Signs/Symptoms:contusion; Signs/Symptoms:possible fall contusion.   COMPARISON: None.   ACCESSION NUMBER(S): [id number]; [id number]; [id number]; [id number]   ORDERING CLINICIAN: ROWAN AVILA   TECHNIQUE: Noncontrast axial CT scan of head was performed, with coronal and sagittal reformats provided. The images were reviewed in bone, brain, blood and soft tissue windows.   Thin cut axial CT images through the facial bones were obtained and reconstructed in the coronal and sagittal plane. 3D reconstruction was created on a dedicated workstation and provided for interpretation.   Axial CT images of the cervical spine are obtained. Axial, coronal and  sagittal reconstructions are provided for review.   FINDINGS: CT HEAD:   No hyperdense intracranial hemorrhage is evident. There is no mass effect or midline shift.   Gray-white differentiation is intact, without evidence of CT apparent transcortical infarct.   There is somewhat disproportionate enlargement of the supratentorial ventricular system relative to basal cisterns nonspecific finding that may represent asymmetric central volume loss or normal pressure hydrocephalus. No abnormal extra-axial fluid collections are identified. Basal cisterns are patent.   Scalp soft tissues do not demonstrate any acute abnormalities. Calvarium is unremarkable in appearance without evidence of depressed skull fracture. Mastoid air cells and middle ear cavities are well aerated without evidence of fluid fluid levels.   CT FACIAL BONES:   Mild left supraorbital and periorbital soft tissue swelling and edema is present, without evidence of associated fracture. Bony orbits are intact.   Intraorbital structures are symmetric in appearance without evidence of acute trauma.   Facial bones appear intact without evidence of displaced fracture. Nasal bones are unremarkable in appearance.   Visualized paranasal sinuses are well aerated without evidence of fluid fluid levels.   Soft tissues overlying the face and skull base do not demonstrate any acute abnormality.   There are numerous missing teeth, with dental cavities and periapical lucencies present in the remaining lower incisors. Temporomandibular joints are intact. No trauma to the oral cavity is identified.   Buccal soft tissues do not demonstrate any acute abnormalities. No fluid collections or soft tissue gas is identified. Parotid and submandibular glands are unremarkable in appearance.   CT C-SPINE:   There is straightening of normal lordotic curvature of the cervical spine, with a 1-2 mm anterolisthesis of C4 on C5.   No compression fractures are identified in the cervical  spine, although mild insufficiency endplate changes with associated Schmorl's nodes are present along the inferior endplate of C6 and superior endplate of C7.   Posterior elements of the cervical spine do not demonstrate any evidence of acute trauma. No abnormal intra spinous distance widening or displaced transverse or spinous process fractures are identified.   Craniocervical junction is intact. Facet joints are preserved with mild degenerate facet osteoarthropathy present at several levels, most pronounced at C3-C4 and C4-C5 on the left.   Mild intervertebral disc height loss is present at C4-C5 and C6-C7.   No high-grade spinal canal stenosis is identified. Mild spinal canal narrowing is possible at the level of C3-C4 due to mild disc bulge and ligamentum flavum thickening with slight effacement of the cervical spinal cord.   Mild neural foraminal narrowing is present at C4-C5 on the left due to endplate spurring and hypertrophic facet changes.   Prevertebral and paraspinal soft tissues do not demonstrate any acute abnormalities.       CT HEAD: 1. No evidence of hemorrhage, depressed skull fracture, or other acute intracranial trauma. 2. Disproportionate enlargement of the supratentorial ventricular system relative to basal cisterns and sulci, nonspecific finding that may represent asymmetric central volume loss or normal pressure hydrocephalus.   CT FACIAL BONES: 1. Left periorbital and supraorbital soft tissue swelling, without evidence of displaced orbital or facial bone fracture. 2. Large dental cavities and periapical lucencies are present in the few remaining lower incisors. Correlate with dental exam.   CT C-SPINE: 1. No evidence of acute trauma to the cervical spine.   MACRO: None   Signed by: Porsche Read 11/18/2023 12:49 AM Dictation workstation:   [id number]    CT maxillofacial bones wo IV contrast    Result Date: 11/18/2023  Interpreted By:  Porsche Read, STUDY: CT HEAD WO IV  CONTRAST; CT CERVICAL SPINE WO IV CONTRAST; CT FACIAL BONES WO IV CONTRAST; CT 3D RECONSTRUCTION;  11/18/2023 12:28 am   INDICATION: Signs/Symptoms:altered mental state; Signs/Symptoms:pain; Signs/Symptoms:contusion; Signs/Symptoms:possible fall contusion.   COMPARISON: None.   ACCESSION NUMBER(S): [id number]; [id number]; [id number]; [id number]   ORDERING CLINICIAN: ROWAN AVILA   TECHNIQUE: Noncontrast axial CT scan of head was performed, with coronal and sagittal reformats provided. The images were reviewed in bone, brain, blood and soft tissue windows.   Thin cut axial CT images through the facial bones were obtained and reconstructed in the coronal and sagittal plane. 3D reconstruction was created on a dedicated workstation and provided for interpretation.   Axial CT images of the cervical spine are obtained. Axial, coronal and sagittal reconstructions are provided for review.   FINDINGS: CT HEAD:   No hyperdense intracranial hemorrhage is evident. There is no mass effect or midline shift.   Gray-white differentiation is intact, without evidence of CT apparent transcortical infarct.   There is somewhat disproportionate enlargement of the supratentorial ventricular system relative to basal cisterns nonspecific finding that may represent asymmetric central volume loss or normal pressure hydrocephalus. No abnormal extra-axial fluid collections are identified. Basal cisterns are patent.   Scalp soft tissues do not demonstrate any acute abnormalities. Calvarium is unremarkable in appearance without evidence of depressed skull fracture. Mastoid air cells and middle ear cavities are well aerated without evidence of fluid fluid levels.   CT FACIAL BONES:   Mild left supraorbital and periorbital soft tissue swelling and edema is present, without evidence of associated fracture. Bony orbits are intact.   Intraorbital structures are symmetric in appearance without evidence of acute trauma.   Facial bones appear  intact without evidence of displaced fracture. Nasal bones are unremarkable in appearance.   Visualized paranasal sinuses are well aerated without evidence of fluid fluid levels.   Soft tissues overlying the face and skull base do not demonstrate any acute abnormality.   There are numerous missing teeth, with dental cavities and periapical lucencies present in the remaining lower incisors. Temporomandibular joints are intact. No trauma to the oral cavity is identified.   Buccal soft tissues do not demonstrate any acute abnormalities. No fluid collections or soft tissue gas is identified. Parotid and submandibular glands are unremarkable in appearance.   CT C-SPINE:   There is straightening of normal lordotic curvature of the cervical spine, with a 1-2 mm anterolisthesis of C4 on C5.   No compression fractures are identified in the cervical spine, although mild insufficiency endplate changes with associated Schmorl's nodes are present along the inferior endplate of C6 and superior endplate of C7.   Posterior elements of the cervical spine do not demonstrate any evidence of acute trauma. No abnormal intra spinous distance widening or displaced transverse or spinous process fractures are identified.   Craniocervical junction is intact. Facet joints are preserved with mild degenerate facet osteoarthropathy present at several levels, most pronounced at C3-C4 and C4-C5 on the left.   Mild intervertebral disc height loss is present at C4-C5 and C6-C7.   No high-grade spinal canal stenosis is identified. Mild spinal canal narrowing is possible at the level of C3-C4 due to mild disc bulge and ligamentum flavum thickening with slight effacement of the cervical spinal cord.   Mild neural foraminal narrowing is present at C4-C5 on the left due to endplate spurring and hypertrophic facet changes.   Prevertebral and paraspinal soft tissues do not demonstrate any acute abnormalities.       CT HEAD: 1. No evidence of hemorrhage,  depressed skull fracture, or other acute intracranial trauma. 2. Disproportionate enlargement of the supratentorial ventricular system relative to basal cisterns and sulci, nonspecific finding that may represent asymmetric central volume loss or normal pressure hydrocephalus.   CT FACIAL BONES: 1. Left periorbital and supraorbital soft tissue swelling, without evidence of displaced orbital or facial bone fracture. 2. Large dental cavities and periapical lucencies are present in the few remaining lower incisors. Correlate with dental exam.   CT C-SPINE: 1. No evidence of acute trauma to the cervical spine.   MACRO: None   Signed by: Porsche Read 11/18/2023 12:49 AM Dictation workstation:   [id number]    CT 3D reconstruction    Result Date: 11/18/2023  Interpreted By:  Porsche Read, STUDY: CT HEAD WO IV CONTRAST; CT CERVICAL SPINE WO IV CONTRAST; CT FACIAL BONES WO IV CONTRAST; CT 3D RECONSTRUCTION;  11/18/2023 12:28 am   INDICATION: Signs/Symptoms:altered mental state; Signs/Symptoms:pain; Signs/Symptoms:contusion; Signs/Symptoms:possible fall contusion.   COMPARISON: None.   ACCESSION NUMBER(S): [id number]; [id number]; [id number]; [id number]   ORDERING CLINICIAN: ROWAN AVILA   TECHNIQUE: Noncontrast axial CT scan of head was performed, with coronal and sagittal reformats provided. The images were reviewed in bone, brain, blood and soft tissue windows.   Thin cut axial CT images through the facial bones were obtained and reconstructed in the coronal and sagittal plane. 3D reconstruction was created on a dedicated workstation and provided for interpretation.   Axial CT images of the cervical spine are obtained. Axial, coronal and sagittal reconstructions are provided for review.   FINDINGS: CT HEAD:   No hyperdense intracranial hemorrhage is evident. There is no mass effect or midline shift.   Gray-white differentiation is intact, without evidence of CT apparent transcortical infarct.    There is somewhat disproportionate enlargement of the supratentorial ventricular system relative to basal cisterns nonspecific finding that may represent asymmetric central volume loss or normal pressure hydrocephalus. No abnormal extra-axial fluid collections are identified. Basal cisterns are patent.   Scalp soft tissues do not demonstrate any acute abnormalities. Calvarium is unremarkable in appearance without evidence of depressed skull fracture. Mastoid air cells and middle ear cavities are well aerated without evidence of fluid fluid levels.   CT FACIAL BONES:   Mild left supraorbital and periorbital soft tissue swelling and edema is present, without evidence of associated fracture. Bony orbits are intact.   Intraorbital structures are symmetric in appearance without evidence of acute trauma.   Facial bones appear intact without evidence of displaced fracture. Nasal bones are unremarkable in appearance.   Visualized paranasal sinuses are well aerated without evidence of fluid fluid levels.   Soft tissues overlying the face and skull base do not demonstrate any acute abnormality.   There are numerous missing teeth, with dental cavities and periapical lucencies present in the remaining lower incisors. Temporomandibular joints are intact. No trauma to the oral cavity is identified.   Buccal soft tissues do not demonstrate any acute abnormalities. No fluid collections or soft tissue gas is identified. Parotid and submandibular glands are unremarkable in appearance.   CT C-SPINE:   There is straightening of normal lordotic curvature of the cervical spine, with a 1-2 mm anterolisthesis of C4 on C5.   No compression fractures are identified in the cervical spine, although mild insufficiency endplate changes with associated Schmorl's nodes are present along the inferior endplate of C6 and superior endplate of C7.   Posterior elements of the cervical spine do not demonstrate any evidence of acute trauma. No abnormal  intra spinous distance widening or displaced transverse or spinous process fractures are identified.   Craniocervical junction is intact. Facet joints are preserved with mild degenerate facet osteoarthropathy present at several levels, most pronounced at C3-C4 and C4-C5 on the left.   Mild intervertebral disc height loss is present at C4-C5 and C6-C7.   No high-grade spinal canal stenosis is identified. Mild spinal canal narrowing is possible at the level of C3-C4 due to mild disc bulge and ligamentum flavum thickening with slight effacement of the cervical spinal cord.   Mild neural foraminal narrowing is present at C4-C5 on the left due to endplate spurring and hypertrophic facet changes.   Prevertebral and paraspinal soft tissues do not demonstrate any acute abnormalities.       CT HEAD: 1. No evidence of hemorrhage, depressed skull fracture, or other acute intracranial trauma. 2. Disproportionate enlargement of the supratentorial ventricular system relative to basal cisterns and sulci, nonspecific finding that may represent asymmetric central volume loss or normal pressure hydrocephalus.   CT FACIAL BONES: 1. Left periorbital and supraorbital soft tissue swelling, without evidence of displaced orbital or facial bone fracture. 2. Large dental cavities and periapical lucencies are present in the few remaining lower incisors. Correlate with dental exam.   CT C-SPINE: 1. No evidence of acute trauma to the cervical spine.   MACRO: None   Signed by: Porsche Read 11/18/2023 12:49 AM Dictation workstation:   [id number]     Assessment/Plan     Néstor Castro is a 73 y.o. male with past medical history of HF with Improved EF, Atrial fibrillation on Eliquis, non-obstructive coronary artery disease, aortic stenosis, sleep apnea admitted for DKA and Altered mental status.      Neurology  #Acute Encephalopathy  - DKA now resolved it is uncertain why patient is still lethargic  - VBG done shows no evidence of CO2  retention with values of 7.43/29/110   - CTH at the time of presentation was not concerning for intracranial lesion and or CVA   - Continue to monitor for change, if no improvement in 24hrs, may re-scan brain   - CT head repeated on 11/20: no intracranial hemorrhage or mass-effect. Moderate ventriculomegaly raising question for communicating hydrocephalus     Pulmonology   #Lung Nodules  - Outpatient Ct in 12 months     Cardiology  #Heart Failure with Improved EF  - Last echocardiogram from 12/2020 showed EF of 55  - Hold Lasix, lisinopril and Jardiance     #Atrial Fibrillation with Rapid Ventricular Response  - restarted PO Metoprolol tartrate 25 twice daily and Flecainide 100 twice daily overnight  - discontinued heparin gtt; restarted eliquis 5mg bid  - discontinued IV metoprolol 5mg q6h (11/20)  - TTE ordered: Poor image quality. Left ventricular systolic function probably normal. Moderate aortic valve stenosis.     #HLD  #Non Obstructive CAD  -Continue aspirin and atorvastatin     Gastroenterology   No acute issues  -Bisacodyl suppository 10mg started     Renal   #Acute Kidney Injury, resolved  -Pre renal in nature in nature   -Baseline Creatine< 1  -Cr now stable 0.99 and BUN 25 (downtrending)     # proteinaceous or hemorrhagic cyst  - noted on CT, Renal US exam is limited without cysts observed  - Urology consulted--> no intervention needed at this time; outpatient follow up     #Rhabdomyolysis  -likely due to fall  - CK 1200  - On D5 nacl 0.45 100 ml/hr--> discontinued     #Hypernatremia  - current sodium of 149 at 5 am (down from 154 at 4pm) decrease of 5mEq over 12 hours  - 1L LR bolus given at 0415; another 1L LR bolus given at 1100  - no maintenance fluids as of right now  - rechecked sodium at 10am--> down to 147; recheck in the morning     Endocrinology  #Diabetic Ketoacidosis  #Uncontrolled Type 2 DM  -Patient last A1C was 11.7 on 03/06  -Home regimen: Lantus 50 unit twice daily, humalog 10 unit  before meal, and empagliflozin 10 daily  -Gap closed, BG sitting around 250-260, awake but still NPO  - Discontinue DKA protocol and orders  - insulin changed to: 50 units lantus Sq bid, lispro 10 units tid with meals, and a high SSI (#4)  - Hold Empagliflozin     Hematology   -No acute issues     Infectious Disease  #Cellulitis   #Leukocytosis   -Likely reactive in the setting of DKA   - Patient noted to have a warm and edematous left leg  - likely in the setting of cellulitis   - deescalated abx from vanc and zosyn to IV cefazolin 1g q8h   - Follow up Bcx: preliminary is normal     Musculoskeletal  #Fall  #Multiple Bruises  #global weakness  - History of multiple falls  - PT/OT consulted  - Monitor bruises for now  - gen surgery consulted for possible debridement of wound on upper abdomen 6 x 23 cm--> intervention not recommended at this time  - wound care on board     Access: piv  Diet: Advanced to Nectar thick diet  GI Prophylaxis: none  DVT: SCD and Eliquis  Antibiotics: IV cefazolin 1g q8h  Code: Full code  Disposition: DKA resolved and AMS improving; Requested transfer to  Talha Morales DO  [id number]-1

## 2023-11-21 NOTE — CONSULTS
"Inpatient consult to Podiatry  Consult performed by: Odessa Marroquin DPM  Consult ordered by: Catarino Byrnes MD          Reason For Consult  B/L lower leg ulcerations  Diabetic foot care  DTI to B/L feet and left heel    History Of Present Illness  Néstor Castro is a 73 y.o. male presenting with wounds to B/L lower legs and in DKA.  Found down at home after unknown period of time.  History from EMR.  Patient is a poor historian.  .     Past Medical History  Heart failure  Atrial fibrillation  CAD  Aortic stenosis  Sleep apnea Type II DM     Surgical History  Colonoscopy  Left heart cath     Social History  Quit smoking 2002. No alcohol or drug use on file. Lives at home independently.      Family History  Father - DM, heart disease  Mother - Sjogrens, celiac     Allergies  Patient has no known allergies.    Review of Systems   Unable to perform     Physical Exam   Constitutional: No acute distress. Laying in ICU bed.   Neuro: Disoriented. Follows commands.   Eyes: Extraocular motions intact. No scleral icterus. Facial abrasions.  EENT: Mucous membranes moist.   Cardiovascular: Regular rate. Palpable pulses bilaterally.   Respiratory: No increased work of breathing or audible wheeze. Equal expansion.  Abdomen: Soft, obese abdomen. Nondistended. Nontender throughout.   MSK: Moves all extremities. No atrophy.  Lymphatic: No palpable lymph nodes. No lymphedema.   Skin: Warm, dry, intact. palpable pedal pulses superficial areas of eshcar/ulceration to lateral aspect of B/L calfs - no surrounding erythema or active drainage;  areas of non-blanchable eccymosis to distal toes B.L feet;  small area of purple discoloration to left posterior heel;  Nails x 10 are long, thickened and discolored;  decreased sensation to light touch    Last Recorded Vitals  Blood pressure 129/60, pulse 89, temperature 36.6 °C (97.9 °F), resp. rate 24, height 1.829 m (6' 0.01\"), weight 127 kg (279 lb 1.6 oz), SpO2 96 %.    Relevant " Results  Scheduled medications  apixaban, 5 mg, oral, BID  [Held by provider] aspirin, 81 mg, oral, q24h  atorvastatin, 40 mg, oral, Daily  bacitracin, , Topical, TID  bisacodyl, 10 mg, rectal, Once  ceFAZolin, 1 g, intravenous, q8h  flecainide, 100 mg, oral, BID  insulin glargine, 50 Units, subcutaneous, BID  insulin lispro, 0-20 Units, subcutaneous, TID with meals  insulin lispro, 10 Units, subcutaneous, TID with meals  metoprolol tartrate, 25 mg, oral, BID  nystatin, 5 mL, Swish & Spit, 4x daily      Continuous medications     PRN medications  PRN medications: acetaminophen, acetaminophen, dextrose 10 % in water (D10W), dextrose, glucagon, insulin regular, oxygenTransthoracic Echo (TTE) Complete    Result Date: 11/20/2023   [facility], [address] [place] [address]               Tel [phone] and Fax [phone] TRANSTHORACIC ECHOCARDIOGRAM REPORT  Patient Name:     JYOTI Nash Physician:   [id number] Debora Jernigan MD Study Date:       11/20/2023          Ordering Provider:   [id number] SIMBA FRAGA MRN/PID:          [id number]            Fellow: Accession#:       [id number]        Nurse:               Angela Cooper RN Date of           1950 / 73      Sonographer:         Keyonna Klein SOLIS Birth/Age:        years Gender:           M                   Additional Staff: Height:           182.88 cm           Admit Date:          11/18/2023 Weight:           124.74 kg           Admission Status:    Inpatient - STAT BSA:              2.44 m2             Encounter#:          [id number]                                       Department Location: [facility] ICU Blood Pressure: 127 /61 mmHg Study Type:    TRANSTHORACIC ECHO (TTE) COMPLETE Diagnosis/ICD: Unspecified atrial fibrillation-I48.91 Indication:    Afib CPT Code:      Echo Complete w Full [id number] Patient History: Pertinent History: A-Fib, CAD and As, Elev. trop.  Study Detail: The following Echo studies were performed: 2D, M-Mode, Doppler and               color flow. Technically challenging study due to patient lying in               supine position and body habitus. Definity used as a contrast               agent for endocardial border definition. Total contrast used for               this procedure was 1.5 mL via IV push. Unable to obtain LA, RA               janiya. and subcostal view. The patient was asleep.  PHYSICIAN INTERPRETATION: Left Ventricle: The left ventricular systolic function probably normal. However accurate estimated can not be done due to poor image quality. The left ventricular cavity size is normal. There is mildly increased left ventricular posterior wall thickness. Left ventricular diastolic filling was indeterminate. Left Atrium: The left atrium was not well visualized. Right Ventricle: The right ventricle is normal in size. There is normal right ventricular global systolic function. Right Atrium: The right atrium was not well visualized. Aortic Valve: The aortic valve was not well visualized. There is moderate aortic valve cusp calcification. There is evidence of moderate aortic valve stenosis. There is no evidence of aortic valve regurgitation. The peak instantaneous gradient of the aortic valve is 34.3 mmHg. The mean gradient of the aortic valve is 19.0 mmHg. Mitral Valve: The mitral valve is mild to moderately thickened. There is mild to moderate mitral annular calcification. There is trace mitral valve regurgitation. Tricuspid Valve: The tricuspid valve was not well visualized. There is trace to mild tricuspid regurgitation. The right ventricular systolic pressure is unable to be estimated. Pulmonic Valve: The pulmonic valve is not well visualized. There is trace pulmonic valve regurgitation. Pericardium: There is no pericardial effusion noted. Aorta: The aortic root is normal. In comparison to the previous echocardiogram(s): There are no prior  studies on this patient for comparison purposes.  CONCLUSIONS:  1. Left ventricular systolic function probably normal. However accurate estimated can not be done due to poor image quality.  2. Poorly visualized anatomical structures due to suboptimal image quality.  3. Moderate aortic valve stenosis. QUANTITATIVE DATA SUMMARY: 2D MEASUREMENTS:                          Normal Ranges: IVSd:          1.11 cm   (0.6-1.1cm) LVPWd:         1.03 cm   (0.6-1.1cm) LVIDd:         5.07 cm   (3.9-5.9cm) LVIDs:         3.98 cm LV Mass Index: 83.6 g/m2 LV % FS        21.5 % AORTA MEASUREMENTS:                    Normal Ranges: Asc Ao, d: 3.00 cm (2.1-3.4cm) LV DIASTOLIC FUNCTION:                     Normal Ranges: MV Peak E: 0.89 m/s (0.7-1.2 m/s) MV Peak A: 0.88 m/s (0.42-0.7 m/s) E/A Ratio: 1.00     (1.0-2.2) MITRAL VALVE:                 Normal Ranges: MV DT: 180 msec (150-240msec) AORTIC VALVE:                                    Normal Ranges: AoV Vmax:                2.93 m/s  (<=1.7m/s) AoV Peak P.3 mmHg (<20mmHg) AoV Mean P.0 mmHg (1.7-11.5mmHg) LVOT Max Eddie:            0.75 m/s  (<=1.1m/s) AoV VTI:                 49.70 cm  (18-25cm) LVOT VTI:                15.00 cm LVOT Diameter:           2.30 cm   (1.8-2.4cm) AoV Area, VTI:           1.25 cm2  (2.5-5.5cm2) AoV Area,Vmax:           1.07 cm2  (2.5-4.5cm2) AoV Dimensionless Index: 0.30  RIGHT VENTRICLE: RV Basal 3.48 cm RV Mid   3.95 cm RV Major 8.6 cm TAPSE:   10.7 mm RV s'    0.11 m/s TRICUSPID VALVE/RVSP:                             Normal Ranges: Peak TR Velocity: 2.23 m/s RV Syst Pressure: 22.9 mmHg (< 30mmHg) PULMONIC VALVE:                      Normal Ranges: PV Max Eddie: 0.7 m/s  (0.6-0.9m/s) PV Max P.7 mmHg  [id number] Debora Jernigan MD Electronically signed on 2023 at 12:17:13 PM  ** Final **     CT head wo IV contrast    Result Date: 2023  Interpreted By:  Miya Sutton, STUDY: CT HEAD WO IV CONTRAST;  2023  10:13 am   INDICATION: Signs/Symptoms:continued altered mentation.   COMPARISON: 12:04 a.m. the same day   ACCESSION NUMBER(S): [id number]   ORDERING CLINICIAN: BECCA RAMOS   TECHNIQUE: Unenhanced CT images of the head were obtained.   FINDINGS: Diffuse atrophy with enlargement of the extra-axial spaces and cerebral sulci similar to the prior exam. Moderate ventriculomegaly likely out of proportion to the degree of cerebral atrophy also similar to the prior study. Subtle periventricular white matter hypodensities consistent with small vessel ischemic disease again seen. No acute intracranial hemorrhage or mass-effect. No midline shift. No extra-axial fluid collection.   No focal calvarial lesion.   Visualized paranasal sinuses are clear.       No acute intracranial hemorrhage or mass-effect. Moderate ventriculomegaly likely out of proportion of the degree of cerebral atrophy and raising question of communicating hydrocephalus again noted, similar to 2 days prior.   MACRO: None.   Signed by: Miya Sutton 11/20/2023 11:30 AM Dictation workstation:   [id number]    XR chest 1 view    Result Date: 11/19/2023  Interpreted By:  Josias Marie, STUDY: XR CHEST 1 VIEW;  11/19/2023 9:05 pm   INDICATION: Signs/Symptoms:Fever, increased rhonci.   COMPARISON: 11/18/2023   ACCESSION NUMBER(S): [id number]   ORDERING CLINICIAN: KHADIJAH DOMINGUEZ   FINDINGS: There is redemonstration of left ventriculomegaly. The aorta is atherosclerotic. Low lung volumes results in basilar and perihilar bronchovascular crowding. There are nonspecific opacities at the medial lung bases that are at least in part atelectatic at the right lung base due to low lung volumes. There is also patchy airspace disease in the medial left lower lobe that appears ostensibly new from 11/18/2023 CT chest abdomen pelvis.         Probable new airspace disease in the medial left lower lobe compared to prior study that may relate to atelectasis or aspiration  pneumonitis. Similarly, there is new perihilar airspace disease that is likely in part atelectatic, but could also be caused by aspiration and postobstructive volume loss. CT chest recommended to further evaluate.   MACRO: None.   Signed by: Josias Marie 11/19/2023 9:32 PM Dictation workstation:   [id number]    US renal complete    Result Date: 11/18/2023  Interpreted By:  Daria Mckinney, STUDY: US RENAL COMPLETE; 11/18/2023 1:55 pm   INDICATION: Concern for hemorrhagic renal cyst.   COMPARISON: CT chest, abdomen and pelvis 11/17/2023   ACCESSION NUMBER(S): [id number]   ORDERING CLINICIAN: CRYSTAL JULIAN   TECHNIQUE: Sonography of the kidneys and urinary bladder was performed.   FINDINGS: Limited examination due to portable technique and patient's immobility.   Right Kidney: *Renal length: 10.2 cm *Parenchyma: Normal parenchymal echogenicity. Normal parenchymal thickness. *Collecting system: No hydronephrosis. *Calculus: No echogenic, shadowing calculus. *Lesion: Previously shown renal cysts on the prior CT not visualized.     Left Kidney: *Renal length: 10.6 cm *Parenchyma: Normal parenchymal echogenicity. Normal parenchymal thickness. *Collecting system: No hydronephrosis. *Calculus: No echogenic, shadowing calculus. *Lesion: Previously shown renal cyst on the prior CT not visualized.   Bladder: Not visualized.       Limited examination due to portable technique and patient's immobility. Previously shown renal cysts on the prior CT not visualized.   No hydronephrosis.   MACRO: None   Signed by: Daria Mckinney 11/18/2023 2:18 PM Dictation workstation:   [id number]    CT thoracic spine wo IV contrast    Result Date: 11/18/2023  Interpreted By:  Porsche Read, STUDY: CT THORACIC SPINE WO IV CONTRAST; CT LUMBAR SPINE WO IV CONTRAST; 11/18/2023 12:27 am   INDICATION: Signs/Symptoms:fall.   COMPARISON: Same day CT of the chest, abdomen and pelvis.   ACCESSION NUMBER(S): [id number]; [id number]    ORDERING CLINICIAN: ROWAN AVILA   TECHNIQUE: Axial CT images of the thoracic and lumbar spine are obtained. Axial, coronal and sagittal reconstructions are submitted for review.   FINDINGS: THORACIC SPINE:   Thoracic vertebral alignment is maintained, without significant spondylolisthesis.   Multilevel insufficiency endplate changes with small Schmorl's nodes are present in the thoracic spine along the inferior endplates of T6, T7, T8 and T9, without evidence of compression fractures.   Posterior elements of the thoracic spine do not demonstrate any evidence of acute trauma. Well corticated ossific fragments along the spinous processes are T3, T4 and T5 likely represent degenerative changes or sequela of remote injuries.   Mild intervertebral disc height loss is present in the mid and lower thoracic spine.   Although the exam is not optimized to assess the spinal canal, no high-grade spinal canal stenosis is identified.   Paraspinal musculature is unremarkable in appearance.   LUMBAR SPINE:   There are 5 lumbar type non rib-bearing vertebral bodies, with lowest well-formed intervertebral disc space labeled L5-S1.   No compression fracture is identified in the lumbar spine, although multilevel insufficiency endplate changes are present, with Schmorl's nodes noted along the inferior endplates of L4 and L5 and superior endplates of L5 and S1.   Posterior elements of the lumbar spine do not demonstrate any evidence of acute trauma. No abnormal intra spinous distance widening or displaced transverse or spinous process fractures are identified.   Moderate intervertebral disc height loss is present at L5-S1.   Although the exam is not optimized to assess the spinal canal, no high-grade stenosis is identified, likely mild spinal canal narrowing is present at L3-L4 and L4-L5 due to disc osteophyte complexes and ligamentum flavum thickening.   Paraspinal musculature does not demonstrate any acute abnormalities.       1.  No  evidence of acute trauma or high-grade stenosis in the thoracic or lumbar spine. 2. Likely mild spinal canal narrowing is present at the levels of L3-L4 and L4-L5 due to disc osteophyte complexes.   MACRO: None   Signed by: Porsche Read 11/18/2023 1:14 AM Dictation workstation:   [id number]    CT lumbar spine wo IV contrast    Result Date: 11/18/2023  Interpreted By:  Porsche Read, STUDY: CT THORACIC SPINE WO IV CONTRAST; CT LUMBAR SPINE WO IV CONTRAST; 11/18/2023 12:27 am   INDICATION: Signs/Symptoms:fall.   COMPARISON: Same day CT of the chest, abdomen and pelvis.   ACCESSION NUMBER(S): [id number]; [id number]   ORDERING CLINICIAN: ROWAN AVILA   TECHNIQUE: Axial CT images of the thoracic and lumbar spine are obtained. Axial, coronal and sagittal reconstructions are submitted for review.   FINDINGS: THORACIC SPINE:   Thoracic vertebral alignment is maintained, without significant spondylolisthesis.   Multilevel insufficiency endplate changes with small Schmorl's nodes are present in the thoracic spine along the inferior endplates of T6, T7, T8 and T9, without evidence of compression fractures.   Posterior elements of the thoracic spine do not demonstrate any evidence of acute trauma. Well corticated ossific fragments along the spinous processes are T3, T4 and T5 likely represent degenerative changes or sequela of remote injuries.   Mild intervertebral disc height loss is present in the mid and lower thoracic spine.   Although the exam is not optimized to assess the spinal canal, no high-grade spinal canal stenosis is identified.   Paraspinal musculature is unremarkable in appearance.   LUMBAR SPINE:   There are 5 lumbar type non rib-bearing vertebral bodies, with lowest well-formed intervertebral disc space labeled L5-S1.   No compression fracture is identified in the lumbar spine, although multilevel insufficiency endplate changes are present, with Schmorl's nodes noted along the inferior  endplates of L4 and L5 and superior endplates of L5 and S1.   Posterior elements of the lumbar spine do not demonstrate any evidence of acute trauma. No abnormal intra spinous distance widening or displaced transverse or spinous process fractures are identified.   Moderate intervertebral disc height loss is present at L5-S1.   Although the exam is not optimized to assess the spinal canal, no high-grade stenosis is identified, likely mild spinal canal narrowing is present at L3-L4 and L4-L5 due to disc osteophyte complexes and ligamentum flavum thickening.   Paraspinal musculature does not demonstrate any acute abnormalities.       1.  No evidence of acute trauma or high-grade stenosis in the thoracic or lumbar spine. 2. Likely mild spinal canal narrowing is present at the levels of L3-L4 and L4-L5 due to disc osteophyte complexes.   MACRO: None   Signed by: Porsche Read 11/18/2023 1:14 AM Dictation workstation:   [id number]    CT chest abdomen pelvis wo IV contrast    Result Date: 11/18/2023  Interpreted By:  Porsche Read, STUDY: CT CHEST ABDOMEN PELVIS WO CONTRAST;  11/18/2023 12:27 am   INDICATION: Signs/Symptoms:pain.   COMPARISON: None.   ACCESSION NUMBER(S): [id number]   ORDERING CLINICIAN: ROWAN AVILA   TECHNIQUE: CT of the chest, abdomen and pelvis was performed. Contiguous axial images were obtained at 3 mm slice thickness through the chest, abdomen and pelvis. Coronal and sagittal reconstructions at 3 mm slice thickness were performed.  No intravenous or oral contrast agents were administered.   FINDINGS: Please note that the study is limited without intravenous contrast.   CHEST:     LUNG/PLEURA/LARGE AIRWAYS: Trachea and central airways are patent, without evidence of endobronchial lesion. There is a diverticulum arising from right mainstem bronchus projecting into the subcarinal region.   Within limitations of somewhat motion degraded exam, no pleural effusions, consolidation or  pneumothorax is evident.   Several tiny 2-3 mm nodules are present in the lungs bilaterally. No suspicious lung masses are identified.   VESSELS: Aorta and pulmonary arteries are normal caliber.  Moderate vascular calcifications are present in the abdominal aorta. Moderate coronary artery calcifications are present.   HEART: Heart is normal in size with pericardial calcifications present. No pericardial effusion is noted.   MEDIASTINUM AND DUDLEY: No mediastinal, hilar or axillary lymph nodes are present.  The esophagus is unremarkable in appearance. There is no evidence of pneumomediastinum.   CHEST WALL AND LOWER NECK: Soft tissues of the chest wall do not demonstrate any acute abnormalities. No displaced rib fractures or other osseous injuries are identified.   ABDOMEN:   LIVER: Liver is normal in size and within limits of noncontrast exam does not demonstrate any acute abnormalities. No perihepatic free fluid is evident.   BILE DUCTS: No intrahepatic or extrahepatic biliary dilatation is present.   GALLBLADDER: Gallbladder is unremarkable in appearance.   PANCREAS: No pancreatic ductal dilatation or peripancreatic stranding is evident.   SPLEEN: Spleen is unremarkable in appearance.   ADRENAL GLANDS: Adrenal glands are unremarkable.   KIDNEYS AND URETERS: Kidneys are symmetric in size without evidence of hydronephrosis bilaterally. Several lobulated low-density lesions arise from the right kidney, possibly representing cysts. No ureteral dilatation is evident.   PELVIS:   BLADDER: Bladder is unremarkable in appearance.   REPRODUCTIVE ORGANS: Prostate is enlarged.   BOWEL: Small bowel is nondilated. No inflammatory wall thickening or abnormal dilatation is present in the large bowel. Appendix is unremarkable in appearance in the right lower quadrant.   Scattered diverticula are present in the sigmoid colon without evidence of acute diverticulitis.   VESSELS: There is no aneurysmal dilatation of the abdominal aorta.  The IVC is within normal limits. Extensive vascular calcifications are present in the abdominal aorta.   PERITONEUM/RETROPERITONEUM/LYMPH NODES: There is no evidence of free air, free fluid, or thick-walled collections in the abdomen and pelvis. No enlarged lymphadenopathy is evident.   ABDOMINAL WALL: The abdominal wall soft tissues appear normal.   BONES: Please refer to separately dictated CT of the thoracic and lumbar spine for further characterization of spinal findings.   No acute osseous abnormality is identified in the pelvis.       1. No evidence of acute abnormality is present in the chest, abdomen or pelvis. No evidence of acute trauma. 2. Moderate coronary artery calcifications, with pericardial calcifications. Correlate with restrictive pericarditis. 3. Several 2-3 mm solid nodules are present in the upper lungs bilaterally, likely benign by size criteria if there is no history of malignancy, although if patient is has risk factors for lung cancer, yearly screening CT is warranted. 4. Scattered diverticula are present in the sigmoid colon without evidence of acute diverticulitis. 5. Low-density lobulated lesions in the lower pole of the right kidney are incompletely characterized on current exam, although cyst cystically likely represent cysts. Slightly more hyperdense exophytic lesion arising from the lower pole of the left kidney may represent a proteinaceous or hemorrhagic cyst but is incompletely characterized on study. 6. Additional findings as detailed above.     MACRO: None   Signed by: Porsche Read 11/18/2023 1:05 AM Dictation workstation:   [id number]    CT head wo IV contrast    Result Date: 11/18/2023  Interpreted By:  Porsche Read, STUDY: CT HEAD WO IV CONTRAST; CT CERVICAL SPINE WO IV CONTRAST; CT FACIAL BONES WO IV CONTRAST; CT 3D RECONSTRUCTION;  11/18/2023 12:28 am   INDICATION: Signs/Symptoms:altered mental state; Signs/Symptoms:pain; Signs/Symptoms:contusion;  Signs/Symptoms:possible fall contusion.   COMPARISON: None.   ACCESSION NUMBER(S): [id number]; [id number]; [id number]; [id number]   ORDERING CLINICIAN: ROWAN AVILA   TECHNIQUE: Noncontrast axial CT scan of head was performed, with coronal and sagittal reformats provided. The images were reviewed in bone, brain, blood and soft tissue windows.   Thin cut axial CT images through the facial bones were obtained and reconstructed in the coronal and sagittal plane. 3D reconstruction was created on a dedicated workstation and provided for interpretation.   Axial CT images of the cervical spine are obtained. Axial, coronal and sagittal reconstructions are provided for review.   FINDINGS: CT HEAD:   No hyperdense intracranial hemorrhage is evident. There is no mass effect or midline shift.   Gray-white differentiation is intact, without evidence of CT apparent transcortical infarct.   There is somewhat disproportionate enlargement of the supratentorial ventricular system relative to basal cisterns nonspecific finding that may represent asymmetric central volume loss or normal pressure hydrocephalus. No abnormal extra-axial fluid collections are identified. Basal cisterns are patent.   Scalp soft tissues do not demonstrate any acute abnormalities. Calvarium is unremarkable in appearance without evidence of depressed skull fracture. Mastoid air cells and middle ear cavities are well aerated without evidence of fluid fluid levels.   CT FACIAL BONES:   Mild left supraorbital and periorbital soft tissue swelling and edema is present, without evidence of associated fracture. Bony orbits are intact.   Intraorbital structures are symmetric in appearance without evidence of acute trauma.   Facial bones appear intact without evidence of displaced fracture. Nasal bones are unremarkable in appearance.   Visualized paranasal sinuses are well aerated without evidence of fluid fluid levels.   Soft tissues overlying the face and  skull base do not demonstrate any acute abnormality.   There are numerous missing teeth, with dental cavities and periapical lucencies present in the remaining lower incisors. Temporomandibular joints are intact. No trauma to the oral cavity is identified.   Buccal soft tissues do not demonstrate any acute abnormalities. No fluid collections or soft tissue gas is identified. Parotid and submandibular glands are unremarkable in appearance.   CT C-SPINE:   There is straightening of normal lordotic curvature of the cervical spine, with a 1-2 mm anterolisthesis of C4 on C5.   No compression fractures are identified in the cervical spine, although mild insufficiency endplate changes with associated Schmorl's nodes are present along the inferior endplate of C6 and superior endplate of C7.   Posterior elements of the cervical spine do not demonstrate any evidence of acute trauma. No abnormal intra spinous distance widening or displaced transverse or spinous process fractures are identified.   Craniocervical junction is intact. Facet joints are preserved with mild degenerate facet osteoarthropathy present at several levels, most pronounced at C3-C4 and C4-C5 on the left.   Mild intervertebral disc height loss is present at C4-C5 and C6-C7.   No high-grade spinal canal stenosis is identified. Mild spinal canal narrowing is possible at the level of C3-C4 due to mild disc bulge and ligamentum flavum thickening with slight effacement of the cervical spinal cord.   Mild neural foraminal narrowing is present at C4-C5 on the left due to endplate spurring and hypertrophic facet changes.   Prevertebral and paraspinal soft tissues do not demonstrate any acute abnormalities.       CT HEAD: 1. No evidence of hemorrhage, depressed skull fracture, or other acute intracranial trauma. 2. Disproportionate enlargement of the supratentorial ventricular system relative to basal cisterns and sulci, nonspecific finding that may represent  asymmetric central volume loss or normal pressure hydrocephalus.   CT FACIAL BONES: 1. Left periorbital and supraorbital soft tissue swelling, without evidence of displaced orbital or facial bone fracture. 2. Large dental cavities and periapical lucencies are present in the few remaining lower incisors. Correlate with dental exam.   CT C-SPINE: 1. No evidence of acute trauma to the cervical spine.   MACRO: None   Signed by: Porsche Read 11/18/2023 12:49 AM Dictation workstation:   [id number]    CT cervical spine wo IV contrast    Result Date: 11/18/2023  Interpreted By:  Porsche Read, STUDY: CT HEAD WO IV CONTRAST; CT CERVICAL SPINE WO IV CONTRAST; CT FACIAL BONES WO IV CONTRAST; CT 3D RECONSTRUCTION;  11/18/2023 12:28 am   INDICATION: Signs/Symptoms:altered mental state; Signs/Symptoms:pain; Signs/Symptoms:contusion; Signs/Symptoms:possible fall contusion.   COMPARISON: None.   ACCESSION NUMBER(S): [id number]; [id number]; [id number]; [id number]   ORDERING CLINICIAN: ROWAN AVILA   TECHNIQUE: Noncontrast axial CT scan of head was performed, with coronal and sagittal reformats provided. The images were reviewed in bone, brain, blood and soft tissue windows.   Thin cut axial CT images through the facial bones were obtained and reconstructed in the coronal and sagittal plane. 3D reconstruction was created on a dedicated workstation and provided for interpretation.   Axial CT images of the cervical spine are obtained. Axial, coronal and sagittal reconstructions are provided for review.   FINDINGS: CT HEAD:   No hyperdense intracranial hemorrhage is evident. There is no mass effect or midline shift.   Gray-white differentiation is intact, without evidence of CT apparent transcortical infarct.   There is somewhat disproportionate enlargement of the supratentorial ventricular system relative to basal cisterns nonspecific finding that may represent asymmetric central volume loss or normal  pressure hydrocephalus. No abnormal extra-axial fluid collections are identified. Basal cisterns are patent.   Scalp soft tissues do not demonstrate any acute abnormalities. Calvarium is unremarkable in appearance without evidence of depressed skull fracture. Mastoid air cells and middle ear cavities are well aerated without evidence of fluid fluid levels.   CT FACIAL BONES:   Mild left supraorbital and periorbital soft tissue swelling and edema is present, without evidence of associated fracture. Bony orbits are intact.   Intraorbital structures are symmetric in appearance without evidence of acute trauma.   Facial bones appear intact without evidence of displaced fracture. Nasal bones are unremarkable in appearance.   Visualized paranasal sinuses are well aerated without evidence of fluid fluid levels.   Soft tissues overlying the face and skull base do not demonstrate any acute abnormality.   There are numerous missing teeth, with dental cavities and periapical lucencies present in the remaining lower incisors. Temporomandibular joints are intact. No trauma to the oral cavity is identified.   Buccal soft tissues do not demonstrate any acute abnormalities. No fluid collections or soft tissue gas is identified. Parotid and submandibular glands are unremarkable in appearance.   CT C-SPINE:   There is straightening of normal lordotic curvature of the cervical spine, with a 1-2 mm anterolisthesis of C4 on C5.   No compression fractures are identified in the cervical spine, although mild insufficiency endplate changes with associated Schmorl's nodes are present along the inferior endplate of C6 and superior endplate of C7.   Posterior elements of the cervical spine do not demonstrate any evidence of acute trauma. No abnormal intra spinous distance widening or displaced transverse or spinous process fractures are identified.   Craniocervical junction is intact. Facet joints are preserved with mild degenerate facet  osteoarthropathy present at several levels, most pronounced at C3-C4 and C4-C5 on the left.   Mild intervertebral disc height loss is present at C4-C5 and C6-C7.   No high-grade spinal canal stenosis is identified. Mild spinal canal narrowing is possible at the level of C3-C4 due to mild disc bulge and ligamentum flavum thickening with slight effacement of the cervical spinal cord.   Mild neural foraminal narrowing is present at C4-C5 on the left due to endplate spurring and hypertrophic facet changes.   Prevertebral and paraspinal soft tissues do not demonstrate any acute abnormalities.       CT HEAD: 1. No evidence of hemorrhage, depressed skull fracture, or other acute intracranial trauma. 2. Disproportionate enlargement of the supratentorial ventricular system relative to basal cisterns and sulci, nonspecific finding that may represent asymmetric central volume loss or normal pressure hydrocephalus.   CT FACIAL BONES: 1. Left periorbital and supraorbital soft tissue swelling, without evidence of displaced orbital or facial bone fracture. 2. Large dental cavities and periapical lucencies are present in the few remaining lower incisors. Correlate with dental exam.   CT C-SPINE: 1. No evidence of acute trauma to the cervical spine.   MACRO: None   Signed by: Porsche Read 11/18/2023 12:49 AM Dictation workstation:   [id number]    CT maxillofacial bones wo IV contrast    Result Date: 11/18/2023  Interpreted By:  Porsche Read, STUDY: CT HEAD WO IV CONTRAST; CT CERVICAL SPINE WO IV CONTRAST; CT FACIAL BONES WO IV CONTRAST; CT 3D RECONSTRUCTION;  11/18/2023 12:28 am   INDICATION: Signs/Symptoms:altered mental state; Signs/Symptoms:pain; Signs/Symptoms:contusion; Signs/Symptoms:possible fall contusion.   COMPARISON: None.   ACCESSION NUMBER(S): [id number]; [id number]; [id number]; [id number]   ORDERING CLINICIAN: ROWAN AVILA   TECHNIQUE: Noncontrast axial CT scan of head was performed, with  coronal and sagittal reformats provided. The images were reviewed in bone, brain, blood and soft tissue windows.   Thin cut axial CT images through the facial bones were obtained and reconstructed in the coronal and sagittal plane. 3D reconstruction was created on a dedicated workstation and provided for interpretation.   Axial CT images of the cervical spine are obtained. Axial, coronal and sagittal reconstructions are provided for review.   FINDINGS: CT HEAD:   No hyperdense intracranial hemorrhage is evident. There is no mass effect or midline shift.   Gray-white differentiation is intact, without evidence of CT apparent transcortical infarct.   There is somewhat disproportionate enlargement of the supratentorial ventricular system relative to basal cisterns nonspecific finding that may represent asymmetric central volume loss or normal pressure hydrocephalus. No abnormal extra-axial fluid collections are identified. Basal cisterns are patent.   Scalp soft tissues do not demonstrate any acute abnormalities. Calvarium is unremarkable in appearance without evidence of depressed skull fracture. Mastoid air cells and middle ear cavities are well aerated without evidence of fluid fluid levels.   CT FACIAL BONES:   Mild left supraorbital and periorbital soft tissue swelling and edema is present, without evidence of associated fracture. Bony orbits are intact.   Intraorbital structures are symmetric in appearance without evidence of acute trauma.   Facial bones appear intact without evidence of displaced fracture. Nasal bones are unremarkable in appearance.   Visualized paranasal sinuses are well aerated without evidence of fluid fluid levels.   Soft tissues overlying the face and skull base do not demonstrate any acute abnormality.   There are numerous missing teeth, with dental cavities and periapical lucencies present in the remaining lower incisors. Temporomandibular joints are intact. No trauma to the oral cavity  is identified.   Buccal soft tissues do not demonstrate any acute abnormalities. No fluid collections or soft tissue gas is identified. Parotid and submandibular glands are unremarkable in appearance.   CT C-SPINE:   There is straightening of normal lordotic curvature of the cervical spine, with a 1-2 mm anterolisthesis of C4 on C5.   No compression fractures are identified in the cervical spine, although mild insufficiency endplate changes with associated Schmorl's nodes are present along the inferior endplate of C6 and superior endplate of C7.   Posterior elements of the cervical spine do not demonstrate any evidence of acute trauma. No abnormal intra spinous distance widening or displaced transverse or spinous process fractures are identified.   Craniocervical junction is intact. Facet joints are preserved with mild degenerate facet osteoarthropathy present at several levels, most pronounced at C3-C4 and C4-C5 on the left.   Mild intervertebral disc height loss is present at C4-C5 and C6-C7.   No high-grade spinal canal stenosis is identified. Mild spinal canal narrowing is possible at the level of C3-C4 due to mild disc bulge and ligamentum flavum thickening with slight effacement of the cervical spinal cord.   Mild neural foraminal narrowing is present at C4-C5 on the left due to endplate spurring and hypertrophic facet changes.   Prevertebral and paraspinal soft tissues do not demonstrate any acute abnormalities.       CT HEAD: 1. No evidence of hemorrhage, depressed skull fracture, or other acute intracranial trauma. 2. Disproportionate enlargement of the supratentorial ventricular system relative to basal cisterns and sulci, nonspecific finding that may represent asymmetric central volume loss or normal pressure hydrocephalus.   CT FACIAL BONES: 1. Left periorbital and supraorbital soft tissue swelling, without evidence of displaced orbital or facial bone fracture. 2. Large dental cavities and periapical  lucencies are present in the few remaining lower incisors. Correlate with dental exam.   CT C-SPINE: 1. No evidence of acute trauma to the cervical spine.   MACRO: None   Signed by: Porsche Read 11/18/2023 12:49 AM Dictation workstation:   [id number]    CT 3D reconstruction    Result Date: 11/18/2023  Interpreted By:  Porsche Read, STUDY: CT HEAD WO IV CONTRAST; CT CERVICAL SPINE WO IV CONTRAST; CT FACIAL BONES WO IV CONTRAST; CT 3D RECONSTRUCTION;  11/18/2023 12:28 am   INDICATION: Signs/Symptoms:altered mental state; Signs/Symptoms:pain; Signs/Symptoms:contusion; Signs/Symptoms:possible fall contusion.   COMPARISON: None.   ACCESSION NUMBER(S): [id number]; [id number]; [id number]; [id number]   ORDERING CLINICIAN: ROWAN AVILA   TECHNIQUE: Noncontrast axial CT scan of head was performed, with coronal and sagittal reformats provided. The images were reviewed in bone, brain, blood and soft tissue windows.   Thin cut axial CT images through the facial bones were obtained and reconstructed in the coronal and sagittal plane. 3D reconstruction was created on a dedicated workstation and provided for interpretation.   Axial CT images of the cervical spine are obtained. Axial, coronal and sagittal reconstructions are provided for review.   FINDINGS: CT HEAD:   No hyperdense intracranial hemorrhage is evident. There is no mass effect or midline shift.   Gray-white differentiation is intact, without evidence of CT apparent transcortical infarct.   There is somewhat disproportionate enlargement of the supratentorial ventricular system relative to basal cisterns nonspecific finding that may represent asymmetric central volume loss or normal pressure hydrocephalus. No abnormal extra-axial fluid collections are identified. Basal cisterns are patent.   Scalp soft tissues do not demonstrate any acute abnormalities. Calvarium is unremarkable in appearance without evidence of depressed skull fracture.  Mastoid air cells and middle ear cavities are well aerated without evidence of fluid fluid levels.   CT FACIAL BONES:   Mild left supraorbital and periorbital soft tissue swelling and edema is present, without evidence of associated fracture. Bony orbits are intact.   Intraorbital structures are symmetric in appearance without evidence of acute trauma.   Facial bones appear intact without evidence of displaced fracture. Nasal bones are unremarkable in appearance.   Visualized paranasal sinuses are well aerated without evidence of fluid fluid levels.   Soft tissues overlying the face and skull base do not demonstrate any acute abnormality.   There are numerous missing teeth, with dental cavities and periapical lucencies present in the remaining lower incisors. Temporomandibular joints are intact. No trauma to the oral cavity is identified.   Buccal soft tissues do not demonstrate any acute abnormalities. No fluid collections or soft tissue gas is identified. Parotid and submandibular glands are unremarkable in appearance.   CT C-SPINE:   There is straightening of normal lordotic curvature of the cervical spine, with a 1-2 mm anterolisthesis of C4 on C5.   No compression fractures are identified in the cervical spine, although mild insufficiency endplate changes with associated Schmorl's nodes are present along the inferior endplate of C6 and superior endplate of C7.   Posterior elements of the cervical spine do not demonstrate any evidence of acute trauma. No abnormal intra spinous distance widening or displaced transverse or spinous process fractures are identified.   Craniocervical junction is intact. Facet joints are preserved with mild degenerate facet osteoarthropathy present at several levels, most pronounced at C3-C4 and C4-C5 on the left.   Mild intervertebral disc height loss is present at C4-C5 and C6-C7.   No high-grade spinal canal stenosis is identified. Mild spinal canal narrowing is possible at the  level of C3-C4 due to mild disc bulge and ligamentum flavum thickening with slight effacement of the cervical spinal cord.   Mild neural foraminal narrowing is present at C4-C5 on the left due to endplate spurring and hypertrophic facet changes.   Prevertebral and paraspinal soft tissues do not demonstrate any acute abnormalities.       CT HEAD: 1. No evidence of hemorrhage, depressed skull fracture, or other acute intracranial trauma. 2. Disproportionate enlargement of the supratentorial ventricular system relative to basal cisterns and sulci, nonspecific finding that may represent asymmetric central volume loss or normal pressure hydrocephalus.   CT FACIAL BONES: 1. Left periorbital and supraorbital soft tissue swelling, without evidence of displaced orbital or facial bone fracture. 2. Large dental cavities and periapical lucencies are present in the few remaining lower incisors. Correlate with dental exam.   CT C-SPINE: 1. No evidence of acute trauma to the cervical spine.   MACRO: None   Signed by: Porsche Read 11/18/2023 12:49 AM Dictation workstation:   [id number]   Results for orders placed or performed during the hospital encounter of 11/18/23 (from the past 24 hour(s))   POCT GLUCOSE   Result Value Ref Range    POCT Glucose 283 (H) 74 - 99 mg/dL   Heparin Assay, [facility]FH   Result Value Ref Range    Heparin Unfractionated 0.4 See Comment Below for Therapeutic Ranges IU/mL   POCT GLUCOSE   Result Value Ref Range    POCT Glucose 207 (H) 74 - 99 mg/dL   POCT GLUCOSE   Result Value Ref Range    POCT Glucose 177 (H) 74 - 99 mg/dL   POCT GLUCOSE   Result Value Ref Range    POCT Glucose 187 (H) 74 - 99 mg/dL   POCT GLUCOSE   Result Value Ref Range    POCT Glucose 165 (H) 74 - 99 mg/dL   POCT GLUCOSE   Result Value Ref Range    POCT Glucose 161 (H) 74 - 99 mg/dL   Heparin Assay, [facility]FH   Result Value Ref Range    Heparin Unfractionated 0.5 See Comment Below for Therapeutic Ranges IU/mL   CBC   Result Value Ref  Range    WBC 9.9 4.4 - 11.3 x10*3/uL    nRBC 0.0 0.0 - 0.0 /100 WBCs    RBC 4.50 4.50 - 5.90 x10*6/uL    Hemoglobin 10.9 (L) 13.5 - 17.5 g/dL    Hematocrit 36.5 (L) 41.0 - 52.0 %    MCV 81 80 - 100 fL    MCH 24.2 (L) 26.0 - 34.0 pg    MCHC 29.9 (L) 32.0 - 36.0 g/dL    RDW 15.0 (H) 11.5 - 14.5 %    Platelets 190 150 - 450 x10*3/uL   Renal function panel   Result Value Ref Range    Glucose 219 (H) 74 - 99 mg/dL    Sodium 149 (H) 136 - 145 mmol/L    Potassium 3.6 3.5 - 5.3 mmol/L    Chloride 116 (H) 98 - 107 mmol/L    Bicarbonate 23 21 - 32 mmol/L    Anion Gap 14 10 - 20 mmol/L    Urea Nitrogen 26 (H) 6 - 23 mg/dL    Creatinine 0.97 0.50 - 1.30 mg/dL    eGFR 83 >60 mL/min/1.73m*2    Calcium 8.7 8.6 - 10.3 mg/dL    Phosphorus 1.7 (L) 2.5 - 4.9 mg/dL    Albumin 2.7 (L) 3.4 - 5.0 g/dL   Magnesium   Result Value Ref Range    Magnesium 1.75 1.60 - 2.40 mg/dL   Vancomycin   Result Value Ref Range    Vancomycin 7.7 5.0 - 20.0 ug/mL   POCT GLUCOSE   Result Value Ref Range    POCT Glucose 222 (H) 74 - 99 mg/dL   Comprehensive metabolic panel   Result Value Ref Range    Glucose 301 (H) 74 - 99 mg/dL    Sodium 147 (H) 136 - 145 mmol/L    Potassium 3.9 3.5 - 5.3 mmol/L    Chloride 115 (H) 98 - 107 mmol/L    Bicarbonate 25 21 - 32 mmol/L    Anion Gap 11 10 - 20 mmol/L    Urea Nitrogen 25 (H) 6 - 23 mg/dL    Creatinine 0.99 0.50 - 1.30 mg/dL    eGFR 81 >60 mL/min/1.73m*2    Calcium 8.6 8.6 - 10.3 mg/dL    Albumin 2.7 (L) 3.4 - 5.0 g/dL    Alkaline Phosphatase 91 33 - 136 U/L    Total Protein 5.7 (L) 6.4 - 8.2 g/dL    AST 40 (H) 9 - 39 U/L    Bilirubin, Total 0.6 0.0 - 1.2 mg/dL    ALT 29 10 - 52 U/L   POCT GLUCOSE   Result Value Ref Range    POCT Glucose 296 (H) 74 - 99 mg/dL   POCT GLUCOSE   Result Value Ref Range    POCT Glucose 253 (H) 74 - 99 mg/dL   POCT GLUCOSE   Result Value Ref Range    POCT Glucose 173 (H) 74 - 99 mg/dL   Susceptibility data from last 90 days.  Collected Specimen Info Organism   11/19/23 Urine from Clean  Catch/Voided Enteric bacilli        Assessment/Plan      Onychomycosis  Nails x 10 debrided in length and thickness.  DTI to distal toes and left posterior heel  Keep open to air.  Cavillon to all sites daily.  Keep heels offloaded in truvue boots while in bed.  B/L lateral calfs ulcerations  No SOI.  Xeroform and mepilex.  Change daily.    I spent 45 minutes in the professional and overall care of this patient.      Odessa Marroquin, KALPESH  [phone]

## 2023-11-21 NOTE — PROGRESS NOTES
Occupational Therapy                 Therapy Communication Note    Patient Name: Néstor Castro  MRN: [id number]  Today's Date: 11/21/2023     Discipline: Occupational Therapy    Missed Visit Reason: Missed Visit Reason: Cancel (Pt medical status remains the same as previous attempts. Discussed with nursing staff, OT to discontinue therapy consults at this time and request re-consult when medical status improves.)    Missed Time: Cancel: 13:19

## 2023-11-21 NOTE — PROGRESS NOTES
Speech-Language Pathology    Inpatient Speech Language Pathology Treatment Note     Patient Name: Néstor Castro  MRN: [id number]  : 1950  Today's Date: 23  Time Calculation  Start Time: 1249  Stop Time: 1308  Time Calculation (min): 19 min         PLAN:  Skilled speech therapy for dysphagia treatment continues to be warranted to provide training and instruction regarding the use of compensatory swallow strategies, to complete oropharyngeal strengthening exercises, for pt/caregiver education in order to reduce risk of aspiration, dehydration and malnutrition. , to assess tolerance of diet      SLP Frequency: 2x per week  Discussed POC: Patient, Nursing, Physician  Discussed Risks/Benefits: Yes  Patient/Caregiver Agreeable: Yes     Recommendations:   Solid Diet Recommendations: Pureed/extremely thick  (IDDSI Level 4)  Liquid Diet Recommendations: Nectar thick/mildly thick (IDDS Level 2)  Compensatory strategies: small bites/sips, upright 90 degrees for intake   Medication administration:     Subjective:  Pt. Seen at bedside for skilled dysphagia treatment.   Prior to Session Communication: Bedside nurse  Pain:  Pain Assessment  Pain Assessment: PAINAD (Pain Assessment in Advanced Dementia Scale)  Pain Score: 0 - No pain         Goals:  Pt will tolerate the least restrictive diet without signs or symptoms of dysphagia on 95% of observed trials.   Patient will participate in ongoing evaluation of swallowing functioning to ensure he is on the safest and least restrictive diet level.       SLP Assessment:    Further evaluation of swallowing functioning completed.  Pt was given multiple sips of nectar thick water. Good oral and pharyngeal management was observed. No coughing/ choking/ change in vocal quality after any swallows. He declined solid food trials. Recommendation for ongoing use of the same diet due to generalized weakness and decreased alertness. Further evaluation by SLP is indicated to ensure  he is on the safest and least restrictive diet level as his status improves.    Risk for diminished oral intake and for aspiration remains due to fatigue, weakness.     Treatment Outcome:  SLP TX Intervention Outcome: Making Progress Towards Goals        Prognosis: Good       Medical Staff Made Aware: Yes       Education:  Pt. Given skilled instruction on safest swallowing.  Pt. was unable to demonstrate understanding, needs further instruction

## 2023-11-21 NOTE — PROGRESS NOTES
Physical Therapy                 Therapy Communication Note    Patient Name: Néstor Castro  MRN: [id number]  Today's Date: 11/21/2023     Discipline: Physical Therapy    Missed Visit Reason: Missed Visit Reason:  (Pt continues to not be medically appropriate for a PT evaluation. Will cancel PT orders at this time. Will require new consult if pt becomes medically appropriate for therapy.)    Missed Time: Cancel at 1313

## 2023-11-21 NOTE — CARE PLAN
The patient's goals for the shift include  to be able to sleep     The clinical goals for the shift include anion gap will remain WNL , Glucose will be WNL by am

## 2023-11-21 NOTE — PROGRESS NOTES
Received referral from [facility] re:  for pt and place of residence. SW was able to reach pt dtr Lauren. Lauren states that she is the only child of pt and that pt wife is . Lauren states that pt lives in a mobile home [address] [place] [address]. Dtr says that before this hospitalization she had talked to pt about ltc at a NH- pt was agreeable at that time. Pt lives in a mobile home and has no other significant assets. Daughter has not yet applied for Medicaid for pt. If pt were to need SNF, dtr is agreeable to this. Updated [facility] of conversation with dtr, will follow and assist [facility] as needed.

## 2023-11-22 NOTE — PROGRESS NOTES
Jyoti Castro is a 73 y.o. male on day 4 of admission presenting with DKA, type 2, not at goal (CMS/HCC).      Subjective   Patient was asleep upon room entry. Able to be woken up by saying his name and tapping on shoulder. HDS. NAEO. He is soft-spoken and mildly lethargic this morning. Able to follow commands: sticks out tongue, moves arms when asked, and wiggled toes. Denies any headaches, chest pain, sob, and abdominal pain.    Objective     Last Recorded Vitals  /65   Pulse 71   Temp 37.2 °C (99 °F) (Temporal)   Resp 23   Wt 126 kg (276 lb 14.4 oz)   SpO2 93%   Intake/Output last 3 Shifts:    Intake/Output Summary (Last 24 hours) at 11/22/2023 1348  Last data filed at 11/22/2023 0444  Gross per 24 hour   Intake 219 ml   Output 1040 ml   Net -821 ml       Admission Weight  Weight: 121 kg (266 lb 12.1 oz) (11/18/23 0553)    Daily Weight  11/22/23 : 126 kg (276 lb 14.4 oz)    Image Results  Transthoracic Echo (TTE) Complete     [facility], [address] [place] [address]                Tel [phone] and Fax [phone]    TRANSTHORACIC ECHOCARDIOGRAM REPORT       Patient Name:     JYOTI CASTRO   Reading Physician:   [id number] Jernigan MD  Study Date:       11/20/2023          Ordering Provider:   [id number] SIMBA FRAGA  MRN/PID:          [id number]            Fellow:  Accession#:       [id number]        Nurse:               Angela Cooper RN  Date of           1950 / 73      Sonographer:         Keyonna Klein RDCS  Birth/Age:        years  Gender:           M                   Additional Staff:  Height:           182.88 cm           Admit Date:          11/18/2023  Weight:           124.74 kg           Admission Status:    Inpatient - STAT  BSA:              2.44 m2             Encounter#:          [id number]                                        Department Location: [facility] ICU  Blood Pressure: 127 /61  mmHg    Study Type:    TRANSTHORACIC ECHO (TTE) COMPLETE  Diagnosis/ICD: Unspecified atrial fibrillation-I48.91  Indication:    Afib  CPT Code:      Echo Complete w Full Doppler-42179    Patient History:  Pertinent History: A-Fib, CAD and As, Elev. trop.    Study Detail: The following Echo studies were performed: 2D, M-Mode, Doppler and                color flow. Technically challenging study due to patient lying in                supine position and body habitus. Definity used as a contrast                agent for endocardial border definition. Total contrast used for                this procedure was 1.5 mL via IV push. Unable to obtain LA, RA                janiya. and subcostal view. The patient was asleep.       PHYSICIAN INTERPRETATION:  Left Ventricle: The left ventricular systolic function probably normal. However accurate estimated can not be done due to poor image quality. The left ventricular cavity size is normal. There is mildly increased left ventricular posterior wall thickness. Left ventricular diastolic filling was indeterminate.  Left Atrium: The left atrium was not well visualized.  Right Ventricle: The right ventricle is normal in size. There is normal right ventricular global systolic function.  Right Atrium: The right atrium was not well visualized.  Aortic Valve: The aortic valve was not well visualized. There is moderate aortic valve cusp calcification. There is evidence of moderate aortic valve stenosis.  There is no evidence of aortic valve regurgitation. The peak instantaneous gradient of the aortic valve is 34.3 mmHg. The mean gradient of the aortic valve is 19.0 mmHg.  Mitral Valve: The mitral valve is mild to moderately thickened. There is mild to moderate mitral annular calcification. There is trace mitral valve regurgitation.  Tricuspid Valve: The tricuspid valve was not well visualized. There is trace to mild tricuspid regurgitation. The right ventricular systolic pressure is unable  to be estimated.  Pulmonic Valve: The pulmonic valve is not well visualized. There is trace pulmonic valve regurgitation.  Pericardium: There is no pericardial effusion noted.  Aorta: The aortic root is normal.  In comparison to the previous echocardiogram(s): There are no prior studies on this patient for comparison purposes.       CONCLUSIONS:   1. Left ventricular systolic function probably normal. However accurate estimated can not be done due to poor image quality.   2. Poorly visualized anatomical structures due to suboptimal image quality.   3. Moderate aortic valve stenosis.    QUANTITATIVE DATA SUMMARY:  2D MEASUREMENTS:                           Normal Ranges:  IVSd:          1.11 cm   (0.6-1.1cm)  LVPWd:         1.03 cm   (0.6-1.1cm)  LVIDd:         5.07 cm   (3.9-5.9cm)  LVIDs:         3.98 cm  LV Mass Index: 83.6 g/m2  LV % FS        21.5 %    AORTA MEASUREMENTS:                     Normal Ranges:  Asc Ao, d: 3.00 cm (2.1-3.4cm)    LV DIASTOLIC FUNCTION:                      Normal Ranges:  MV Peak E: 0.89 m/s (0.7-1.2 m/s)  MV Peak A: 0.88 m/s (0.42-0.7 m/s)  E/A Ratio: 1.00     (1.0-2.2)    MITRAL VALVE:                  Normal Ranges:  MV DT: 180 msec (150-240msec)    AORTIC VALVE:                                     Normal Ranges:  AoV Vmax:                2.93 m/s  (<=1.7m/s)  AoV Peak P.3 mmHg (<20mmHg)  AoV Mean P.0 mmHg (1.7-11.5mmHg)  LVOT Max Eddie:            0.75 m/s  (<=1.1m/s)  AoV VTI:                 49.70 cm  (18-25cm)  LVOT VTI:                15.00 cm  LVOT Diameter:           2.30 cm   (1.8-2.4cm)  AoV Area, VTI:           1.25 cm2  (2.5-5.5cm2)  AoV Area,Vmax:           1.07 cm2  (2.5-4.5cm2)  AoV Dimensionless Index: 0.30       RIGHT VENTRICLE:  RV Basal 3.48 cm  RV Mid   3.95 cm  RV Major 8.6 cm  TAPSE:   10.7 mm  RV s'    0.11 m/s    TRICUSPID VALVE/RVSP:                              Normal Ranges:  Peak TR Velocity: 2.23 m/s  RV Syst Pressure:  22.9 mmHg (< 30mmHg)    PULMONIC VALVE:                       Normal Ranges:  PV Max Eddie: 0.7 m/s  (0.6-0.9m/s)  PV Max P.7 mmHg       [id number] Debora Jernigan MD  Electronically signed on 2023 at 12:17:13 PM       ** Final **  CT head wo IV contrast  Narrative: Interpreted By:  Miya Sutton,   STUDY:  CT HEAD WO IV CONTRAST;  2023 10:13 am      INDICATION:  Signs/Symptoms:continued altered mentation.      COMPARISON:  12:04 a.m. the same day      ACCESSION NUMBER(S):  [id number]      ORDERING CLINICIAN:  BECCA RAMOS      TECHNIQUE:  Unenhanced CT images of the head were obtained.      FINDINGS:  Diffuse atrophy with enlargement of the extra-axial spaces and  cerebral sulci similar to the prior exam. Moderate ventriculomegaly  likely out of proportion to the degree of cerebral atrophy also  similar to the prior study. Subtle periventricular white matter  hypodensities consistent with small vessel ischemic disease again  seen. No acute intracranial hemorrhage or mass-effect. No midline  shift. No extra-axial fluid collection.      No focal calvarial lesion.      Visualized paranasal sinuses are clear.      Impression: No acute intracranial hemorrhage or mass-effect. Moderate  ventriculomegaly likely out of proportion of the degree of cerebral  atrophy and raising question of communicating hydrocephalus again  noted, similar to 2 days prior.      MACRO:  None.      Signed by: Miya Sutton 2023 11:30 AM  Dictation workstation:   [id number]      Physical Exam  Cardiovascular:      Rate and Rhythm: Normal rate and regular rhythm.      Pulses: Normal pulses.      Heart sounds: Normal heart sounds.   Pulmonary:      Effort: Pulmonary effort is normal.      Breath sounds: Normal breath sounds.   Abdominal:      General: Abdomen is flat. Bowel sounds are normal.      Palpations: Abdomen is soft.   Genitourinary:     Comments: External urinary catheter present  Musculoskeletal:      Comments: RUE and  LUE: 3/5 below the elbow; hasn't been able to move arm at the shoulder b/l  RLE and LLE: 1/5   Skin:     General: Skin is warm and dry.   Neurological:      Mental Status: He is disoriented.      Comments: A&O x 2: oriented to person and time (not place or situation)       Relevant Results  Scheduled medications  apixaban, 5 mg, oral, BID  [Held by provider] aspirin, 81 mg, oral, q24h  atorvastatin, 40 mg, oral, Daily  bacitracin, , Topical, TID  bisacodyl, 10 mg, rectal, Once  ceFAZolin, 1 g, intravenous, q8h  flecainide, 100 mg, oral, BID  insulin glargine, 50 Units, subcutaneous, BID  insulin lispro, 0-20 Units, subcutaneous, TID with meals  insulin lispro, 10 Units, subcutaneous, TID with meals  metoprolol tartrate, 25 mg, oral, BID  nystatin, 5 mL, Swish & Spit, 4x daily  potassium phosphate, 15 mmol, intravenous, Once      Continuous medications  dextrose 5 % in water (D5W), 50 mL/hr, Last Rate: 50 mL/hr (11/22/23 1159)      PRN medications  PRN medications: acetaminophen, acetaminophen, dextrose 10 % in water (D10W), dextrose, glucagon, insulin regular, oxygen    Results for orders placed or performed during the hospital encounter of 11/18/23 (from the past 24 hour(s))   POCT GLUCOSE   Result Value Ref Range    POCT Glucose 253 (H) 74 - 99 mg/dL   POCT GLUCOSE   Result Value Ref Range    POCT Glucose 173 (H) 74 - 99 mg/dL   Renal function panel   Result Value Ref Range    Glucose 95 74 - 99 mg/dL    Sodium 148 (H) 136 - 145 mmol/L    Potassium 3.4 (L) 3.5 - 5.3 mmol/L    Chloride 112 (H) 98 - 107 mmol/L    Bicarbonate 26 21 - 32 mmol/L    Anion Gap 13 10 - 20 mmol/L    Urea Nitrogen 21 6 - 23 mg/dL    Creatinine 0.94 0.50 - 1.30 mg/dL    eGFR 86 >60 mL/min/1.73m*2    Calcium 8.7 8.6 - 10.3 mg/dL    Phosphorus 2.4 (L) 2.5 - 4.9 mg/dL    Albumin 2.8 (L) 3.4 - 5.0 g/dL   CBC   Result Value Ref Range    WBC 9.4 4.4 - 11.3 x10*3/uL    nRBC 0.0 0.0 - 0.0 /100 WBCs    RBC 4.23 (L) 4.50 - 5.90 x10*6/uL    Hemoglobin  10.4 (L) 13.5 - 17.5 g/dL    Hematocrit 34.7 (L) 41.0 - 52.0 %    MCV 82 80 - 100 fL    MCH 24.6 (L) 26.0 - 34.0 pg    MCHC 30.0 (L) 32.0 - 36.0 g/dL    RDW 15.6 (H) 11.5 - 14.5 %    Platelets 221 150 - 450 x10*3/uL   Magnesium   Result Value Ref Range    Magnesium 1.83 1.60 - 2.40 mg/dL   POCT GLUCOSE   Result Value Ref Range    POCT Glucose 107 (H) 74 - 99 mg/dL   POCT GLUCOSE   Result Value Ref Range    POCT Glucose 186 (H) 74 - 99 mg/dL     Transthoracic Echo (TTE) Complete    Result Date: 11/20/2023   [facility], [address] [place] [address]               Tel [phone] and Fax [phone] TRANSTHORACIC ECHOCARDIOGRAM REPORT  Patient Name:     JYOTI Nash Physician:   [id number] Debora Jernigan MD Study Date:       11/20/2023          Ordering Provider:   [id number] SIMBA FRAGA MRN/PID:          [id number]            Fellow: Accession#:       [id number]        Nurse:               Angela Cooper RN Date of           1950 / 73      Sonographer:         Keyonna Klein RDCS Birth/Age:        years Gender:           M                   Additional Staff: Height:           182.88 cm           Admit Date:          11/18/2023 Weight:           124.74 kg           Admission Status:    Inpatient - STAT BSA:              2.44 m2             Encounter#:          [id number]                                       Department Location: [facility] ICU Blood Pressure: 127 /61 mmHg Study Type:    TRANSTHORACIC ECHO (TTE) COMPLETE Diagnosis/ICD: Unspecified atrial fibrillation-I48.91 Indication:    Afib CPT Code:      Echo Complete w Full Doppler-35051 Patient History: Pertinent History: A-Fib, CAD and As, Elev. trop. Study Detail: The following Echo studies were performed: 2D, M-Mode, Doppler and               color flow. Technically challenging study due to patient lying in               supine position and body habitus. Definity used as  a contrast               agent for endocardial border definition. Total contrast used for               this procedure was 1.5 mL via IV push. Unable to obtain LA, RA               janiya. and subcostal view. The patient was asleep.  PHYSICIAN INTERPRETATION: Left Ventricle: The left ventricular systolic function probably normal. However accurate estimated can not be done due to poor image quality. The left ventricular cavity size is normal. There is mildly increased left ventricular posterior wall thickness. Left ventricular diastolic filling was indeterminate. Left Atrium: The left atrium was not well visualized. Right Ventricle: The right ventricle is normal in size. There is normal right ventricular global systolic function. Right Atrium: The right atrium was not well visualized. Aortic Valve: The aortic valve was not well visualized. There is moderate aortic valve cusp calcification. There is evidence of moderate aortic valve stenosis. There is no evidence of aortic valve regurgitation. The peak instantaneous gradient of the aortic valve is 34.3 mmHg. The mean gradient of the aortic valve is 19.0 mmHg. Mitral Valve: The mitral valve is mild to moderately thickened. There is mild to moderate mitral annular calcification. There is trace mitral valve regurgitation. Tricuspid Valve: The tricuspid valve was not well visualized. There is trace to mild tricuspid regurgitation. The right ventricular systolic pressure is unable to be estimated. Pulmonic Valve: The pulmonic valve is not well visualized. There is trace pulmonic valve regurgitation. Pericardium: There is no pericardial effusion noted. Aorta: The aortic root is normal. In comparison to the previous echocardiogram(s): There are no prior studies on this patient for comparison purposes.  CONCLUSIONS:  1. Left ventricular systolic function probably normal. However accurate estimated can not be done due to poor image quality.  2. Poorly visualized anatomical  structures due to suboptimal image quality.  3. Moderate aortic valve stenosis. QUANTITATIVE DATA SUMMARY: 2D MEASUREMENTS:                          Normal Ranges: IVSd:          1.11 cm   (0.6-1.1cm) LVPWd:         1.03 cm   (0.6-1.1cm) LVIDd:         5.07 cm   (3.9-5.9cm) LVIDs:         3.98 cm LV Mass Index: 83.6 g/m2 LV % FS        21.5 % AORTA MEASUREMENTS:                    Normal Ranges: Asc Ao, d: 3.00 cm (2.1-3.4cm) LV DIASTOLIC FUNCTION:                     Normal Ranges: MV Peak E: 0.89 m/s (0.7-1.2 m/s) MV Peak A: 0.88 m/s (0.42-0.7 m/s) E/A Ratio: 1.00     (1.0-2.2) MITRAL VALVE:                 Normal Ranges: MV DT: 180 msec (150-240msec) AORTIC VALVE:                                    Normal Ranges: AoV Vmax:                2.93 m/s  (<=1.7m/s) AoV Peak P.3 mmHg (<20mmHg) AoV Mean P.0 mmHg (1.7-11.5mmHg) LVOT Max Eddie:            0.75 m/s  (<=1.1m/s) AoV VTI:                 49.70 cm  (18-25cm) LVOT VTI:                15.00 cm LVOT Diameter:           2.30 cm   (1.8-2.4cm) AoV Area, VTI:           1.25 cm2  (2.5-5.5cm2) AoV Area,Vmax:           1.07 cm2  (2.5-4.5cm2) AoV Dimensionless Index: 0.30  RIGHT VENTRICLE: RV Basal 3.48 cm RV Mid   3.95 cm RV Major 8.6 cm TAPSE:   10.7 mm RV s'    0.11 m/s TRICUSPID VALVE/RVSP:                             Normal Ranges: Peak TR Velocity: 2.23 m/s RV Syst Pressure: 22.9 mmHg (< 30mmHg) PULMONIC VALVE:                      Normal Ranges: PV Max Eddie: 0.7 m/s  (0.6-0.9m/s) PV Max P.7 mmHg  [id number] Debora Jernigan MD Electronically signed on 2023 at 12:17:13 PM  ** Final **     CT head wo IV contrast    Result Date: 2023  Interpreted By:  Miya Sutton, STUDY: CT HEAD WO IV CONTRAST;  2023 10:13 am   INDICATION: Signs/Symptoms:continued altered mentation.   COMPARISON: 12:04 a.m. the same day   ACCESSION NUMBER(S): [id number]   ORDERING CLINICIAN: BECCA RAMOS   TECHNIQUE: Unenhanced CT images of the head  were obtained.   FINDINGS: Diffuse atrophy with enlargement of the extra-axial spaces and cerebral sulci similar to the prior exam. Moderate ventriculomegaly likely out of proportion to the degree of cerebral atrophy also similar to the prior study. Subtle periventricular white matter hypodensities consistent with small vessel ischemic disease again seen. No acute intracranial hemorrhage or mass-effect. No midline shift. No extra-axial fluid collection.   No focal calvarial lesion.   Visualized paranasal sinuses are clear.       No acute intracranial hemorrhage or mass-effect. Moderate ventriculomegaly likely out of proportion of the degree of cerebral atrophy and raising question of communicating hydrocephalus again noted, similar to 2 days prior.   MACRO: None.   Signed by: Miya Sutton 11/20/2023 11:30 AM Dictation workstation:   [id number]    XR chest 1 view    Result Date: 11/19/2023  Interpreted By:  Josias Marie, STUDY: XR CHEST 1 VIEW;  11/19/2023 9:05 pm   INDICATION: Signs/Symptoms:Fever, increased rhonci.   COMPARISON: 11/18/2023   ACCESSION NUMBER(S): [id number]   ORDERING CLINICIAN: KHADIJAH DOMINGUEZ   FINDINGS: There is redemonstration of left ventriculomegaly. The aorta is atherosclerotic. Low lung volumes results in basilar and perihilar bronchovascular crowding. There are nonspecific opacities at the medial lung bases that are at least in part atelectatic at the right lung base due to low lung volumes. There is also patchy airspace disease in the medial left lower lobe that appears ostensibly new from 11/18/2023 CT chest abdomen pelvis.         Probable new airspace disease in the medial left lower lobe compared to prior study that may relate to atelectasis or aspiration pneumonitis. Similarly, there is new perihilar airspace disease that is likely in part atelectatic, but could also be caused by aspiration and postobstructive volume loss. CT chest recommended to further evaluate.   MACRO: None.    Signed by: Josias Marie 11/19/2023 9:32 PM Dictation workstation:   [id number]    US renal complete    Result Date: 11/18/2023  Interpreted By:  Daria Mckinney, STUDY: US RENAL COMPLETE; 11/18/2023 1:55 pm   INDICATION: Concern for hemorrhagic renal cyst.   COMPARISON: CT chest, abdomen and pelvis 11/17/2023   ACCESSION NUMBER(S): [id number]   ORDERING CLINICIAN: CRYSTAL JULIAN   TECHNIQUE: Sonography of the kidneys and urinary bladder was performed.   FINDINGS: Limited examination due to portable technique and patient's immobility.   Right Kidney: *Renal length: 10.2 cm *Parenchyma: Normal parenchymal echogenicity. Normal parenchymal thickness. *Collecting system: No hydronephrosis. *Calculus: No echogenic, shadowing calculus. *Lesion: Previously shown renal cysts on the prior CT not visualized.     Left Kidney: *Renal length: 10.6 cm *Parenchyma: Normal parenchymal echogenicity. Normal parenchymal thickness. *Collecting system: No hydronephrosis. *Calculus: No echogenic, shadowing calculus. *Lesion: Previously shown renal cyst on the prior CT not visualized.   Bladder: Not visualized.       Limited examination due to portable technique and patient's immobility. Previously shown renal cysts on the prior CT not visualized.   No hydronephrosis.   MACRO: None   Signed by: Daria Mckinney 11/18/2023 2:18 PM Dictation workstation:   [id number]    CT thoracic spine wo IV contrast    Result Date: 11/18/2023  Interpreted By:  Porsche Read, STUDY: CT THORACIC SPINE WO IV CONTRAST; CT LUMBAR SPINE WO IV CONTRAST; 11/18/2023 12:27 am   INDICATION: Signs/Symptoms:fall.   COMPARISON: Same day CT of the chest, abdomen and pelvis.   ACCESSION NUMBER(S): [id number]; [id number]   ORDERING CLINICIAN: ROWAN AVILA   TECHNIQUE: Axial CT images of the thoracic and lumbar spine are obtained. Axial, coronal and sagittal reconstructions are submitted for review.   FINDINGS: THORACIC SPINE:   Thoracic vertebral  alignment is maintained, without significant spondylolisthesis.   Multilevel insufficiency endplate changes with small Schmorl's nodes are present in the thoracic spine along the inferior endplates of T6, T7, T8 and T9, without evidence of compression fractures.   Posterior elements of the thoracic spine do not demonstrate any evidence of acute trauma. Well corticated ossific fragments along the spinous processes are T3, T4 and T5 likely represent degenerative changes or sequela of remote injuries.   Mild intervertebral disc height loss is present in the mid and lower thoracic spine.   Although the exam is not optimized to assess the spinal canal, no high-grade spinal canal stenosis is identified.   Paraspinal musculature is unremarkable in appearance.   LUMBAR SPINE:   There are 5 lumbar type non rib-bearing vertebral bodies, with lowest well-formed intervertebral disc space labeled L5-S1.   No compression fracture is identified in the lumbar spine, although multilevel insufficiency endplate changes are present, with Schmorl's nodes noted along the inferior endplates of L4 and L5 and superior endplates of L5 and S1.   Posterior elements of the lumbar spine do not demonstrate any evidence of acute trauma. No abnormal intra spinous distance widening or displaced transverse or spinous process fractures are identified.   Moderate intervertebral disc height loss is present at L5-S1.   Although the exam is not optimized to assess the spinal canal, no high-grade stenosis is identified, likely mild spinal canal narrowing is present at L3-L4 and L4-L5 due to disc osteophyte complexes and ligamentum flavum thickening.   Paraspinal musculature does not demonstrate any acute abnormalities.       1.  No evidence of acute trauma or high-grade stenosis in the thoracic or lumbar spine. 2. Likely mild spinal canal narrowing is present at the levels of L3-L4 and L4-L5 due to disc osteophyte complexes.   MACRO: None   Signed by:  Porsche Read 11/18/2023 1:14 AM Dictation workstation:   [id number]    CT lumbar spine wo IV contrast    Result Date: 11/18/2023  Interpreted By:  Porsche Read, STUDY: CT THORACIC SPINE WO IV CONTRAST; CT LUMBAR SPINE WO IV CONTRAST; 11/18/2023 12:27 am   INDICATION: Signs/Symptoms:fall.   COMPARISON: Same day CT of the chest, abdomen and pelvis.   ACCESSION NUMBER(S): [id number]; [id number]   ORDERING CLINICIAN: ROWAN AVILA   TECHNIQUE: Axial CT images of the thoracic and lumbar spine are obtained. Axial, coronal and sagittal reconstructions are submitted for review.   FINDINGS: THORACIC SPINE:   Thoracic vertebral alignment is maintained, without significant spondylolisthesis.   Multilevel insufficiency endplate changes with small Schmorl's nodes are present in the thoracic spine along the inferior endplates of T6, T7, T8 and T9, without evidence of compression fractures.   Posterior elements of the thoracic spine do not demonstrate any evidence of acute trauma. Well corticated ossific fragments along the spinous processes are T3, T4 and T5 likely represent degenerative changes or sequela of remote injuries.   Mild intervertebral disc height loss is present in the mid and lower thoracic spine.   Although the exam is not optimized to assess the spinal canal, no high-grade spinal canal stenosis is identified.   Paraspinal musculature is unremarkable in appearance.   LUMBAR SPINE:   There are 5 lumbar type non rib-bearing vertebral bodies, with lowest well-formed intervertebral disc space labeled L5-S1.   No compression fracture is identified in the lumbar spine, although multilevel insufficiency endplate changes are present, with Schmorl's nodes noted along the inferior endplates of L4 and L5 and superior endplates of L5 and S1.   Posterior elements of the lumbar spine do not demonstrate any evidence of acute trauma. No abnormal intra spinous distance widening or displaced transverse or  spinous process fractures are identified.   Moderate intervertebral disc height loss is present at L5-S1.   Although the exam is not optimized to assess the spinal canal, no high-grade stenosis is identified, likely mild spinal canal narrowing is present at L3-L4 and L4-L5 due to disc osteophyte complexes and ligamentum flavum thickening.   Paraspinal musculature does not demonstrate any acute abnormalities.       1.  No evidence of acute trauma or high-grade stenosis in the thoracic or lumbar spine. 2. Likely mild spinal canal narrowing is present at the levels of L3-L4 and L4-L5 due to disc osteophyte complexes.   MACRO: None   Signed by: Porsche Read 11/18/2023 1:14 AM Dictation workstation:   [id number]    CT chest abdomen pelvis wo IV contrast    Result Date: 11/18/2023  Interpreted By:  Porsche Read, STUDY: CT CHEST ABDOMEN PELVIS WO CONTRAST;  11/18/2023 12:27 am   INDICATION: Signs/Symptoms:pain.   COMPARISON: None.   ACCESSION NUMBER(S): [id number]   ORDERING CLINICIAN: ROWAN AVILA   TECHNIQUE: CT of the chest, abdomen and pelvis was performed. Contiguous axial images were obtained at 3 mm slice thickness through the chest, abdomen and pelvis. Coronal and sagittal reconstructions at 3 mm slice thickness were performed.  No intravenous or oral contrast agents were administered.   FINDINGS: Please note that the study is limited without intravenous contrast.   CHEST:     LUNG/PLEURA/LARGE AIRWAYS: Trachea and central airways are patent, without evidence of endobronchial lesion. There is a diverticulum arising from right mainstem bronchus projecting into the subcarinal region.   Within limitations of somewhat motion degraded exam, no pleural effusions, consolidation or pneumothorax is evident.   Several tiny 2-3 mm nodules are present in the lungs bilaterally. No suspicious lung masses are identified.   VESSELS: Aorta and pulmonary arteries are normal caliber.  Moderate vascular  calcifications are present in the abdominal aorta. Moderate coronary artery calcifications are present.   HEART: Heart is normal in size with pericardial calcifications present. No pericardial effusion is noted.   MEDIASTINUM AND DUDLEY: No mediastinal, hilar or axillary lymph nodes are present.  The esophagus is unremarkable in appearance. There is no evidence of pneumomediastinum.   CHEST WALL AND LOWER NECK: Soft tissues of the chest wall do not demonstrate any acute abnormalities. No displaced rib fractures or other osseous injuries are identified.   ABDOMEN:   LIVER: Liver is normal in size and within limits of noncontrast exam does not demonstrate any acute abnormalities. No perihepatic free fluid is evident.   BILE DUCTS: No intrahepatic or extrahepatic biliary dilatation is present.   GALLBLADDER: Gallbladder is unremarkable in appearance.   PANCREAS: No pancreatic ductal dilatation or peripancreatic stranding is evident.   SPLEEN: Spleen is unremarkable in appearance.   ADRENAL GLANDS: Adrenal glands are unremarkable.   KIDNEYS AND URETERS: Kidneys are symmetric in size without evidence of hydronephrosis bilaterally. Several lobulated low-density lesions arise from the right kidney, possibly representing cysts. No ureteral dilatation is evident.   PELVIS:   BLADDER: Bladder is unremarkable in appearance.   REPRODUCTIVE ORGANS: Prostate is enlarged.   BOWEL: Small bowel is nondilated. No inflammatory wall thickening or abnormal dilatation is present in the large bowel. Appendix is unremarkable in appearance in the right lower quadrant.   Scattered diverticula are present in the sigmoid colon without evidence of acute diverticulitis.   VESSELS: There is no aneurysmal dilatation of the abdominal aorta. The IVC is within normal limits. Extensive vascular calcifications are present in the abdominal aorta.   PERITONEUM/RETROPERITONEUM/LYMPH NODES: There is no evidence of free air, free fluid, or thick-walled  collections in the abdomen and pelvis. No enlarged lymphadenopathy is evident.   ABDOMINAL WALL: The abdominal wall soft tissues appear normal.   BONES: Please refer to separately dictated CT of the thoracic and lumbar spine for further characterization of spinal findings.   No acute osseous abnormality is identified in the pelvis.       1. No evidence of acute abnormality is present in the chest, abdomen or pelvis. No evidence of acute trauma. 2. Moderate coronary artery calcifications, with pericardial calcifications. Correlate with restrictive pericarditis. 3. Several 2-3 mm solid nodules are present in the upper lungs bilaterally, likely benign by size criteria if there is no history of malignancy, although if patient is has risk factors for lung cancer, yearly screening CT is warranted. 4. Scattered diverticula are present in the sigmoid colon without evidence of acute diverticulitis. 5. Low-density lobulated lesions in the lower pole of the right kidney are incompletely characterized on current exam, although cyst cystically likely represent cysts. Slightly more hyperdense exophytic lesion arising from the lower pole of the left kidney may represent a proteinaceous or hemorrhagic cyst but is incompletely characterized on study. 6. Additional findings as detailed above.     MACRO: None   Signed by: Porsche Read 11/18/2023 1:05 AM Dictation workstation:   [id number]    CT head wo IV contrast    Result Date: 11/18/2023  Interpreted By:  Porsche Read, STUDY: CT HEAD WO IV CONTRAST; CT CERVICAL SPINE WO IV CONTRAST; CT FACIAL BONES WO IV CONTRAST; CT 3D RECONSTRUCTION;  11/18/2023 12:28 am   INDICATION: Signs/Symptoms:altered mental state; Signs/Symptoms:pain; Signs/Symptoms:contusion; Signs/Symptoms:possible fall contusion.   COMPARISON: None.   ACCESSION NUMBER(S): [id number]; [id number]; [id number]; [id number]   ORDERING CLINICIAN: ROWAN AVILA   TECHNIQUE: Noncontrast axial CT scan  of head was performed, with coronal and sagittal reformats provided. The images were reviewed in bone, brain, blood and soft tissue windows.   Thin cut axial CT images through the facial bones were obtained and reconstructed in the coronal and sagittal plane. 3D reconstruction was created on a dedicated workstation and provided for interpretation.   Axial CT images of the cervical spine are obtained. Axial, coronal and sagittal reconstructions are provided for review.   FINDINGS: CT HEAD:   No hyperdense intracranial hemorrhage is evident. There is no mass effect or midline shift.   Gray-white differentiation is intact, without evidence of CT apparent transcortical infarct.   There is somewhat disproportionate enlargement of the supratentorial ventricular system relative to basal cisterns nonspecific finding that may represent asymmetric central volume loss or normal pressure hydrocephalus. No abnormal extra-axial fluid collections are identified. Basal cisterns are patent.   Scalp soft tissues do not demonstrate any acute abnormalities. Calvarium is unremarkable in appearance without evidence of depressed skull fracture. Mastoid air cells and middle ear cavities are well aerated without evidence of fluid fluid levels.   CT FACIAL BONES:   Mild left supraorbital and periorbital soft tissue swelling and edema is present, without evidence of associated fracture. Bony orbits are intact.   Intraorbital structures are symmetric in appearance without evidence of acute trauma.   Facial bones appear intact without evidence of displaced fracture. Nasal bones are unremarkable in appearance.   Visualized paranasal sinuses are well aerated without evidence of fluid fluid levels.   Soft tissues overlying the face and skull base do not demonstrate any acute abnormality.   There are numerous missing teeth, with dental cavities and periapical lucencies present in the remaining lower incisors. Temporomandibular joints are intact. No  trauma to the oral cavity is identified.   Buccal soft tissues do not demonstrate any acute abnormalities. No fluid collections or soft tissue gas is identified. Parotid and submandibular glands are unremarkable in appearance.   CT C-SPINE:   There is straightening of normal lordotic curvature of the cervical spine, with a 1-2 mm anterolisthesis of C4 on C5.   No compression fractures are identified in the cervical spine, although mild insufficiency endplate changes with associated Schmorl's nodes are present along the inferior endplate of C6 and superior endplate of C7.   Posterior elements of the cervical spine do not demonstrate any evidence of acute trauma. No abnormal intra spinous distance widening or displaced transverse or spinous process fractures are identified.   Craniocervical junction is intact. Facet joints are preserved with mild degenerate facet osteoarthropathy present at several levels, most pronounced at C3-C4 and C4-C5 on the left.   Mild intervertebral disc height loss is present at C4-C5 and C6-C7.   No high-grade spinal canal stenosis is identified. Mild spinal canal narrowing is possible at the level of C3-C4 due to mild disc bulge and ligamentum flavum thickening with slight effacement of the cervical spinal cord.   Mild neural foraminal narrowing is present at C4-C5 on the left due to endplate spurring and hypertrophic facet changes.   Prevertebral and paraspinal soft tissues do not demonstrate any acute abnormalities.       CT HEAD: 1. No evidence of hemorrhage, depressed skull fracture, or other acute intracranial trauma. 2. Disproportionate enlargement of the supratentorial ventricular system relative to basal cisterns and sulci, nonspecific finding that may represent asymmetric central volume loss or normal pressure hydrocephalus.   CT FACIAL BONES: 1. Left periorbital and supraorbital soft tissue swelling, without evidence of displaced orbital or facial bone fracture. 2. Large dental  cavities and periapical lucencies are present in the few remaining lower incisors. Correlate with dental exam.   CT C-SPINE: 1. No evidence of acute trauma to the cervical spine.   MACRO: None   Signed by: Porsche Read 11/18/2023 12:49 AM Dictation workstation:   [id number]    CT cervical spine wo IV contrast    Result Date: 11/18/2023  Interpreted By:  Porsche Read, STUDY: CT HEAD WO IV CONTRAST; CT CERVICAL SPINE WO IV CONTRAST; CT FACIAL BONES WO IV CONTRAST; CT 3D RECONSTRUCTION;  11/18/2023 12:28 am   INDICATION: Signs/Symptoms:altered mental state; Signs/Symptoms:pain; Signs/Symptoms:contusion; Signs/Symptoms:possible fall contusion.   COMPARISON: None.   ACCESSION NUMBER(S): [id number]; [id number]; [id number]; [id number]   ORDERING CLINICIAN: ROWAN AVILA   TECHNIQUE: Noncontrast axial CT scan of head was performed, with coronal and sagittal reformats provided. The images were reviewed in bone, brain, blood and soft tissue windows.   Thin cut axial CT images through the facial bones were obtained and reconstructed in the coronal and sagittal plane. 3D reconstruction was created on a dedicated workstation and provided for interpretation.   Axial CT images of the cervical spine are obtained. Axial, coronal and sagittal reconstructions are provided for review.   FINDINGS: CT HEAD:   No hyperdense intracranial hemorrhage is evident. There is no mass effect or midline shift.   Gray-white differentiation is intact, without evidence of CT apparent transcortical infarct.   There is somewhat disproportionate enlargement of the supratentorial ventricular system relative to basal cisterns nonspecific finding that may represent asymmetric central volume loss or normal pressure hydrocephalus. No abnormal extra-axial fluid collections are identified. Basal cisterns are patent.   Scalp soft tissues do not demonstrate any acute abnormalities. Calvarium is unremarkable in appearance without  evidence of depressed skull fracture. Mastoid air cells and middle ear cavities are well aerated without evidence of fluid fluid levels.   CT FACIAL BONES:   Mild left supraorbital and periorbital soft tissue swelling and edema is present, without evidence of associated fracture. Bony orbits are intact.   Intraorbital structures are symmetric in appearance without evidence of acute trauma.   Facial bones appear intact without evidence of displaced fracture. Nasal bones are unremarkable in appearance.   Visualized paranasal sinuses are well aerated without evidence of fluid fluid levels.   Soft tissues overlying the face and skull base do not demonstrate any acute abnormality.   There are numerous missing teeth, with dental cavities and periapical lucencies present in the remaining lower incisors. Temporomandibular joints are intact. No trauma to the oral cavity is identified.   Buccal soft tissues do not demonstrate any acute abnormalities. No fluid collections or soft tissue gas is identified. Parotid and submandibular glands are unremarkable in appearance.   CT C-SPINE:   There is straightening of normal lordotic curvature of the cervical spine, with a 1-2 mm anterolisthesis of C4 on C5.   No compression fractures are identified in the cervical spine, although mild insufficiency endplate changes with associated Schmorl's nodes are present along the inferior endplate of C6 and superior endplate of C7.   Posterior elements of the cervical spine do not demonstrate any evidence of acute trauma. No abnormal intra spinous distance widening or displaced transverse or spinous process fractures are identified.   Craniocervical junction is intact. Facet joints are preserved with mild degenerate facet osteoarthropathy present at several levels, most pronounced at C3-C4 and C4-C5 on the left.   Mild intervertebral disc height loss is present at C4-C5 and C6-C7.   No high-grade spinal canal stenosis is identified. Mild spinal  canal narrowing is possible at the level of C3-C4 due to mild disc bulge and ligamentum flavum thickening with slight effacement of the cervical spinal cord.   Mild neural foraminal narrowing is present at C4-C5 on the left due to endplate spurring and hypertrophic facet changes.   Prevertebral and paraspinal soft tissues do not demonstrate any acute abnormalities.       CT HEAD: 1. No evidence of hemorrhage, depressed skull fracture, or other acute intracranial trauma. 2. Disproportionate enlargement of the supratentorial ventricular system relative to basal cisterns and sulci, nonspecific finding that may represent asymmetric central volume loss or normal pressure hydrocephalus.   CT FACIAL BONES: 1. Left periorbital and supraorbital soft tissue swelling, without evidence of displaced orbital or facial bone fracture. 2. Large dental cavities and periapical lucencies are present in the few remaining lower incisors. Correlate with dental exam.   CT C-SPINE: 1. No evidence of acute trauma to the cervical spine.   MACRO: None   Signed by: Porsche Read 11/18/2023 12:49 AM Dictation workstation:   [id number]    CT maxillofacial bones wo IV contrast    Result Date: 11/18/2023  Interpreted By:  Porsche Read, STUDY: CT HEAD WO IV CONTRAST; CT CERVICAL SPINE WO IV CONTRAST; CT FACIAL BONES WO IV CONTRAST; CT 3D RECONSTRUCTION;  11/18/2023 12:28 am   INDICATION: Signs/Symptoms:altered mental state; Signs/Symptoms:pain; Signs/Symptoms:contusion; Signs/Symptoms:possible fall contusion.   COMPARISON: None.   ACCESSION NUMBER(S): [id number]; [id number]; [id number]; [id number]   ORDERING CLINICIAN: ROWAN AVILA   TECHNIQUE: Noncontrast axial CT scan of head was performed, with coronal and sagittal reformats provided. The images were reviewed in bone, brain, blood and soft tissue windows.   Thin cut axial CT images through the facial bones were obtained and reconstructed in the coronal and sagittal  plane. 3D reconstruction was created on a dedicated workstation and provided for interpretation.   Axial CT images of the cervical spine are obtained. Axial, coronal and sagittal reconstructions are provided for review.   FINDINGS: CT HEAD:   No hyperdense intracranial hemorrhage is evident. There is no mass effect or midline shift.   Gray-white differentiation is intact, without evidence of CT apparent transcortical infarct.   There is somewhat disproportionate enlargement of the supratentorial ventricular system relative to basal cisterns nonspecific finding that may represent asymmetric central volume loss or normal pressure hydrocephalus. No abnormal extra-axial fluid collections are identified. Basal cisterns are patent.   Scalp soft tissues do not demonstrate any acute abnormalities. Calvarium is unremarkable in appearance without evidence of depressed skull fracture. Mastoid air cells and middle ear cavities are well aerated without evidence of fluid fluid levels.   CT FACIAL BONES:   Mild left supraorbital and periorbital soft tissue swelling and edema is present, without evidence of associated fracture. Bony orbits are intact.   Intraorbital structures are symmetric in appearance without evidence of acute trauma.   Facial bones appear intact without evidence of displaced fracture. Nasal bones are unremarkable in appearance.   Visualized paranasal sinuses are well aerated without evidence of fluid fluid levels.   Soft tissues overlying the face and skull base do not demonstrate any acute abnormality.   There are numerous missing teeth, with dental cavities and periapical lucencies present in the remaining lower incisors. Temporomandibular joints are intact. No trauma to the oral cavity is identified.   Buccal soft tissues do not demonstrate any acute abnormalities. No fluid collections or soft tissue gas is identified. Parotid and submandibular glands are unremarkable in appearance.   CT C-SPINE:   There is  straightening of normal lordotic curvature of the cervical spine, with a 1-2 mm anterolisthesis of C4 on C5.   No compression fractures are identified in the cervical spine, although mild insufficiency endplate changes with associated Schmorl's nodes are present along the inferior endplate of C6 and superior endplate of C7.   Posterior elements of the cervical spine do not demonstrate any evidence of acute trauma. No abnormal intra spinous distance widening or displaced transverse or spinous process fractures are identified.   Craniocervical junction is intact. Facet joints are preserved with mild degenerate facet osteoarthropathy present at several levels, most pronounced at C3-C4 and C4-C5 on the left.   Mild intervertebral disc height loss is present at C4-C5 and C6-C7.   No high-grade spinal canal stenosis is identified. Mild spinal canal narrowing is possible at the level of C3-C4 due to mild disc bulge and ligamentum flavum thickening with slight effacement of the cervical spinal cord.   Mild neural foraminal narrowing is present at C4-C5 on the left due to endplate spurring and hypertrophic facet changes.   Prevertebral and paraspinal soft tissues do not demonstrate any acute abnormalities.       CT HEAD: 1. No evidence of hemorrhage, depressed skull fracture, or other acute intracranial trauma. 2. Disproportionate enlargement of the supratentorial ventricular system relative to basal cisterns and sulci, nonspecific finding that may represent asymmetric central volume loss or normal pressure hydrocephalus.   CT FACIAL BONES: 1. Left periorbital and supraorbital soft tissue swelling, without evidence of displaced orbital or facial bone fracture. 2. Large dental cavities and periapical lucencies are present in the few remaining lower incisors. Correlate with dental exam.   CT C-SPINE: 1. No evidence of acute trauma to the cervical spine.   MACRO: None   Signed by: Porsche Read 11/18/2023 12:49 AM  Dictation workstation:   [id number]    CT 3D reconstruction    Result Date: 11/18/2023  Interpreted By:  Porsche Reda, STUDY: CT HEAD WO IV CONTRAST; CT CERVICAL SPINE WO IV CONTRAST; CT FACIAL BONES WO IV CONTRAST; CT 3D RECONSTRUCTION;  11/18/2023 12:28 am   INDICATION: Signs/Symptoms:altered mental state; Signs/Symptoms:pain; Signs/Symptoms:contusion; Signs/Symptoms:possible fall contusion.   COMPARISON: None.   ACCESSION NUMBER(S): [id number]; [id number]; [id number]; [id number]   ORDERING CLINICIAN: ROWAN AVILA   TECHNIQUE: Noncontrast axial CT scan of head was performed, with coronal and sagittal reformats provided. The images were reviewed in bone, brain, blood and soft tissue windows.   Thin cut axial CT images through the facial bones were obtained and reconstructed in the coronal and sagittal plane. 3D reconstruction was created on a dedicated workstation and provided for interpretation.   Axial CT images of the cervical spine are obtained. Axial, coronal and sagittal reconstructions are provided for review.   FINDINGS: CT HEAD:   No hyperdense intracranial hemorrhage is evident. There is no mass effect or midline shift.   Gray-white differentiation is intact, without evidence of CT apparent transcortical infarct.   There is somewhat disproportionate enlargement of the supratentorial ventricular system relative to basal cisterns nonspecific finding that may represent asymmetric central volume loss or normal pressure hydrocephalus. No abnormal extra-axial fluid collections are identified. Basal cisterns are patent.   Scalp soft tissues do not demonstrate any acute abnormalities. Calvarium is unremarkable in appearance without evidence of depressed skull fracture. Mastoid air cells and middle ear cavities are well aerated without evidence of fluid fluid levels.   CT FACIAL BONES:   Mild left supraorbital and periorbital soft tissue swelling and edema is present, without evidence of  associated fracture. Bony orbits are intact.   Intraorbital structures are symmetric in appearance without evidence of acute trauma.   Facial bones appear intact without evidence of displaced fracture. Nasal bones are unremarkable in appearance.   Visualized paranasal sinuses are well aerated without evidence of fluid fluid levels.   Soft tissues overlying the face and skull base do not demonstrate any acute abnormality.   There are numerous missing teeth, with dental cavities and periapical lucencies present in the remaining lower incisors. Temporomandibular joints are intact. No trauma to the oral cavity is identified.   Buccal soft tissues do not demonstrate any acute abnormalities. No fluid collections or soft tissue gas is identified. Parotid and submandibular glands are unremarkable in appearance.   CT C-SPINE:   There is straightening of normal lordotic curvature of the cervical spine, with a 1-2 mm anterolisthesis of C4 on C5.   No compression fractures are identified in the cervical spine, although mild insufficiency endplate changes with associated Schmorl's nodes are present along the inferior endplate of C6 and superior endplate of C7.   Posterior elements of the cervical spine do not demonstrate any evidence of acute trauma. No abnormal intra spinous distance widening or displaced transverse or spinous process fractures are identified.   Craniocervical junction is intact. Facet joints are preserved with mild degenerate facet osteoarthropathy present at several levels, most pronounced at C3-C4 and C4-C5 on the left.   Mild intervertebral disc height loss is present at C4-C5 and C6-C7.   No high-grade spinal canal stenosis is identified. Mild spinal canal narrowing is possible at the level of C3-C4 due to mild disc bulge and ligamentum flavum thickening with slight effacement of the cervical spinal cord.   Mild neural foraminal narrowing is present at C4-C5 on the left due to endplate spurring and  hypertrophic facet changes.   Prevertebral and paraspinal soft tissues do not demonstrate any acute abnormalities.       CT HEAD: 1. No evidence of hemorrhage, depressed skull fracture, or other acute intracranial trauma. 2. Disproportionate enlargement of the supratentorial ventricular system relative to basal cisterns and sulci, nonspecific finding that may represent asymmetric central volume loss or normal pressure hydrocephalus.   CT FACIAL BONES: 1. Left periorbital and supraorbital soft tissue swelling, without evidence of displaced orbital or facial bone fracture. 2. Large dental cavities and periapical lucencies are present in the few remaining lower incisors. Correlate with dental exam.   CT C-SPINE: 1. No evidence of acute trauma to the cervical spine.   MACRO: None   Signed by: Porsche Read 11/18/2023 12:49 AM Dictation workstation:   [id number]     Assessment/Plan     Néstor Castro is a 73 y.o. male with past medical history of HF with Improved EF, Atrial fibrillation on Eliquis, non-obstructive coronary artery disease, aortic stenosis, sleep apnea admitted for DKA and Altered mental status.      Neurology  #Acute Encephalopathy  - DKA resolved; AMS improving  - VBG done shows no evidence of CO2 retention with values of 7.43/29/110   - CTH at the time of presentation was not concerning for intracranial lesion and or CVA   - Continue to monitor for change, if no improvement in 24hrs, may re-scan brain   - CT head repeated on 11/20: no intracranial hemorrhage or mass-effect. Moderate ventriculomegaly raising question for communicating hydrocephalus     Pulmonology   #Lung Nodules  - Outpatient Ct in 12 months     Cardiology  #Heart Failure with Improved EF  - Last echocardiogram from 12/2020 showed EF of 55  - Hold Lasix, lisinopril and Jardiance     #Atrial Fibrillation with Rapid Ventricular Response  - restarted PO Metoprolol tartrate 25 twice daily and Flecainide 100 twice daily  overnight  - discontinued heparin gtt; restarted eliquis 5mg bid  - discontinued IV metoprolol 5mg q6h (11/20)  - TTE ordered: Poor image quality. Left ventricular systolic function probably normal. Moderate aortic valve stenosis.     #HLD  #Non Obstructive CAD  -Continue aspirin and atorvastatin     Gastroenterology   No acute issues  -Bisacodyl suppository 10mg started     Renal   #Acute Kidney Injury, resolved  -Pre renal in nature in nature   -Baseline Creatine< 1  -Cr now stable 0.99 and BUN 25 (downtrending)     # proteinaceous or hemorrhagic cyst  - noted on CT, Renal US exam is limited without cysts observed  - Urology consulted--> no intervention needed at this time; outpatient follow up     #Rhabdomyolysis  -likely due to fall  - CK 1200  - On D5 nacl 0.45 100 ml/hr--> discontinued     #Hypernatremia  - current sodium of 149 at 5 am (down from 154 at 4pm) decrease of 5mEq over 12 hours  - 1L LR bolus given at 0415; another 1L LR bolus given at 1100  - ordered additional 1L LR bolus for Na 148 this AM (11/22)  - initiate D5W @ 50cc/hr for continued hypernatremia  - recheck Na in the morning    #Hypophosphatemia  - repleted with IV Kphos 15mmol     Endocrinology  #Diabetic Ketoacidosis  #Uncontrolled Type 2 DM  -Patient last A1C was 11.7 on 03/06  -Home regimen: Lantus 50 unit twice daily, humalog 10 unit before meal, and empagliflozin 10 daily  -Gap closed, BG sitting around 250-260, awake but still NPO  - Discontinue DKA protocol and orders  - insulin changed to: 50 units lantus Sq bid, lispro 10 units tid with meals, and a high SSI (#4)  - Required 4, 12, 12 for SSI on 11/21; will continue current insulin regimen  - Hold Empagliflozin     Hematology   -No acute issues     Infectious Disease  #Cellulitis   #Leukocytosis   -Likely reactive in the setting of DKA   - Patient noted to have a warm and edematous left leg  - likely in the setting of cellulitis   - deescalated abx from vanc and zosyn to IV  cefazolin 1g q8h   - Follow up Bcx: preliminary is normal  - urine culture revealed enteric bacilli - still pending gram stain and sensitivities     Musculoskeletal  #Fall  #Multiple Bruises  #global weakness  - History of multiple falls  - PT/OT: missed sessions today due to inability to follow commands per PT and OT notes; not medically appropriate for rehab at this time due to continued AMS and inability to consistently follow commands (11/22)  - Monitor bruises for now  - gen surgery consulted for possible debridement of wound on upper abdomen 6 x 23 cm--> intervention not recommended at this time  - wound care on board     Access: piv  Diet: Advanced to adult diet carb controlled: 60g carb/meal, 30g carb evening snack; pureed 4; Mild thick 2  GI Prophylaxis: none  DVT: SCD and Eliquis  Antibiotics: IV cefazolin 1g q8h  Code: Full code  Disposition: DKA resolved and AMS improving; Requested transfer to [address] [place] [address]; consider consult to palliative care due to prolonged hospitalization    Shannan Morales DO  PGY-1

## 2023-11-22 NOTE — CARE PLAN
The clinical goals for the shift include Pt's blood sugar will remain under 200.    Problem: Skin  Goal: Decreased wound size/increased tissue granulation at next dressing change  11/22/2023 0510 by Brice Mead RN  Flowsheets (Taken 11/22/2023 0510)  Decreased wound size/increased tissue granulation at next dressing change: Promote sleep for wound healing  11/22/2023 0510 by Brice Mead RN  Outcome: Progressing  Flowsheets (Taken 11/22/2023 0510)  Decreased wound size/increased tissue granulation at next dressing change: Promote sleep for wound healing  Goal: Participates in plan/prevention/treatment measures  11/22/2023 0510 by Brice Mead RN  Flowsheets (Taken 11/22/2023 0510)  Participates in plan/prevention/treatment measures: Elevate heels  11/22/2023 0510 by Brice Mead RN  Outcome: Progressing  Flowsheets (Taken 11/22/2023 0510)  Participates in plan/prevention/treatment measures: Elevate heels  Goal: Prevent/manage excess moisture  11/22/2023 0510 by Brice Mead RN  Flowsheets (Taken 11/22/2023 0510)  Prevent/manage excess moisture: Use wicking fabric (obtain order)  11/22/2023 0510 by Brice Mead RN  Outcome: Progressing  Flowsheets (Taken 11/22/2023 0510)  Prevent/manage excess moisture: Use wicking fabric (obtain order)  Goal: Prevent/minimize sheer/friction injuries  Outcome: Progressing  Goal: Promote/optimize nutrition  Outcome: Progressing  Goal: Promote skin healing  Outcome: Progressing  Problem: Diabetes  Goal: Achieve decreasing blood glucose levels by end of shift  Outcome: Progressing  Goal: Increase stability of blood glucose readings by end of shift  Outcome: Progressing  Goal: Decrease in ketones present in urine by end of shift  Outcome: Progressing  Goal: Maintain electrolyte levels within acceptable range throughout shift  Outcome: Progressing  Goal: Maintain glucose levels >70mg/dl to <250mg/dl throughout shift  Outcome: Progressing  Goal: No changes in neurological exam by  end of shift  Outcome: Progressing  Goal: Learn about and adhere to nutrition recommendations by end of shift  Outcome: Progressing  Goal: Vital signs within normal range for age by end of shift  Outcome: Progressing  Goal: Increase self care and/or family involovement by end of shift  Outcome: Progressing  Goal: Receive DSME education by end of shift  Outcome: Progressing  Problem: Pain - Adult  Goal: Verbalizes/displays adequate comfort level or baseline comfort level  Outcome: Progressing  Problem: Safety - Adult  Goal: Free from fall injury  Outcome: Progressing  Problem: Discharge Planning  Goal: Discharge to home or other facility with appropriate resources  Outcome: Progressing  Problem: Chronic Conditions and Co-morbidities  Goal: Patient's chronic conditions and co-morbidity symptoms are monitored and maintained or improved  Outcome: Progressing

## 2023-11-22 NOTE — PROGRESS NOTES
Speech-Language Pathology    Inpatient  Speech-Language Pathology Treatment     Patient Name: Néstor Castro  MRN: [id number]  Today's Date: 11/22/2023            Current Problem:   1. DKA, type 2, not at goal (CMS/HCC)        2. Atrial fibrillation with rapid ventricular response (CMS/HCC)  Transthoracic Echo (TTE) Complete    Transthoracic Echo (TTE) Complete            SLP Assessment:  Education Provided: Yes       Plan:  SLP TX Plan: Continue Plan of Care      Subjective   Current Problem:  Aspiration risk    Most Recent Visit:  Evaluation 11/21/23       General Visit Information:   Total Number of Visits : 2  Prior to Session Communication:  (RN cleared for tx.  Reports tolerating prescribed diet without s/s aspiration.)      Pain Assessment:   Pain Assessment:  (0)  Pain Score:  (O)      Therapeutic Swallow:  Therapeutic Swallow Intervention : Compensatory Strategies (Effortful swallow)  Solid Diet Recommendations: Pureed/extremely thick  (IDDSI Level 4) (Pureed solids)  Liquid Diet Recommendations: Nectar thick/mildly thick (IDDS Level 2)        Inpatient:  Pt provided with instruction regarding positioning and rate of intake to decrease risk for aspiration.

## 2023-11-22 NOTE — PROGRESS NOTES
23 1528   Discharge Planning   Living Arrangements Other (Comment)  (Unable to determine at this time)   Support Systems Other (Comment)  (Unable to determine at this time)   Assistance Needed Patient is from home alone, has one daughter, spouse is . Patient was having trouble caring for himself at home and daughter would like patient placed in SNF vs LTC. LSW following.   Type of Residence Private residence   Who is requesting discharge planning? Provider   Home or Post Acute Services Other (Comment)  (TBD)   Type of Post Acute Facility Services Skilled nursing;Rehab   Patient expects to be discharged to: TBD   Does the patient need discharge transport arranged? Yes   RoundTrip coordination needed? Yes   Has discharge transport been arranged? No

## 2023-11-22 NOTE — CONSULTS
General/Trauma Surgery History and Physical      History Of Present Illness  Néstor Castro is a 73 y.o. male who is currently admitted to our ICU with DKA and altered mental status. Patient is disoriented on exam, unable to provide history. Daughter reported last baseline was on Tuesday when she talked to him. On Wed and Thurs, she attempted to call but had no response and therefore went to his house. He was found down covered in stool and urine and was disoriented. Glucose was found to be at 807 on admission. No traumatic findings on imaging. Surgery is consulted on a large area of eschar on the mid to left chest, linear, presumably from being found down at home.       Past Medical History  Heart failure  Atrial fibrillation  CAD  Aortic stenosis  Sleep apnea Type II DM    Surgical History  Colonoscopy  Left heart cath     Social History  Quit smoking 2002. No alcohol or drug use on file. Lives at home independently.     Family History  Father - DM, heart disease  Mother - Sjogrens, celiac    Allergies  Patient has no known allergies.    Meds  Current Outpatient Medications   Medication Instructions    ammonium lactate (LAC-HYDRIN) 396 g, Topical, As needed    apixaban (ELIQUIS) 5 mg, oral, 2 times daily    aspirin 81 mg, oral, Every 24 hours    atorvastatin (LIPITOR) 40 mg, oral, Daily    empagliflozin (Jardiance) 10 mg 1 tablet, oral, Daily with breakfast    flecainide (TAMBOCOR) 100 mg, oral, 2 times daily    furosemide (LASIX) 80 mg, oral, Daily RT    insulin detemir (Levemir FlexPen) 100 unit/mL (3 mL) pen as directed Subcutaneous 45 units in the morning and 55 units before dinner    insulin glargine (Lantus) 100 unit/mL (3 mL) pen INJECT 50 UNITS SUBCUTANEOUSLY TWICE DAILY    lisinopril 5 mg, oral, Daily RT    magnesium oxide (MAG-OX) 400 mg, oral, Daily    metoprolol tartrate (LOPRESSOR) 25 mg, oral, 2 times daily    potassium chloride CR 20 mEq ER tablet 20 mEq, oral, Daily, Do not crush or chew.     "    Review of Systems   A complete 10 point review of systems was performed and is negative except as noted in the history of present illness.     Last Recorded Vitals  Blood pressure 120/57, pulse 73, temperature 36.6 °C (97.9 °F), temperature source Temporal, resp. rate 24, height 1.829 m (6' 0.01\"), weight 127 kg (279 lb 1.6 oz), SpO2 95 %.    Pain Score: 0 - No pain  [place]-Baker FACES Pain Rating: No hurt  PAINAD Score:  [0-4]      Physical Exam  Constitutional: No acute distress. Laying in ICU bed.   Neuro: Disoriented. Follows some commands.   Eyes: Extraocular motions intact. No scleral icterus. Facial abrasions.  EENT: Mucous membranes moist.   Cardiovascular: Regular rate. Palpable pulses bilaterally.   Respiratory: No increased work of breathing or audible wheeze. Equal expansion.  Abdomen: Soft, obese abdomen. Nondistended. Nontender throughout.   MSK: Moves all extremities. No atrophy.  Lymphatic: No palpable lymph nodes. No lymphedema.   Skin: Warm, dry, intact. Lower mid to left anterior chest with linear area with superficial eschar, slightly moist but no active drainage or fluctuance. Extends from around xiphoid to anterior axillary line. No surrounding erythema.  Psychological: Disoriented.          Relevant Results  Laboratory Results:  CBC:   Lab Results   Component Value Date    WBC 9.9 11/21/2023    RBC 4.50 11/21/2023    HGB 10.9 (L) 11/21/2023    HCT 36.5 (L) 11/21/2023     11/21/2023       RFP:   Lab Results   Component Value Date     (H) 11/21/2023    K 3.9 11/21/2023     (H) 11/21/2023    CO2 25 11/21/2023    BUN 25 (H) 11/21/2023    CREATININE 0.99 11/21/2023    CALCIUM 8.6 11/21/2023    MG 1.75 11/21/2023    PHOS 1.7 (L) 11/21/2023        LFTs:   Lab Results   Component Value Date    PROT 5.7 (L) 11/21/2023    ALBUMIN 2.7 (L) 11/21/2023    BILITOT 0.6 11/21/2023    ALKPHOS 91 11/21/2023    AST 40 (H) 11/21/2023    ALT 29 11/21/2023         Imaging:  Transthoracic Echo " (TTE) Complete     [facility], [address] [address]                Tel [phone] and Fax [phone]    TRANSTHORACIC ECHOCARDIOGRAM REPORT       Patient Name:     JYOTI ARITA   Reading Physician:   [id number] Debora Jernigan MD  Study Date:       11/20/2023          Ordering Provider:   [id number] SIMBA FRAGA  MRN/PID:          [id number]            Fellow:  Accession#:       [id number]        Nurse:               Angela Cooper RN  Date of           1950 / 73      Sonographer:         Keyonna Klein RD  Birth/Age:        years  Gender:           M                   Additional Staff:  Height:           182.88 cm           Admit Date:          11/18/2023  Weight:           124.74 kg           Admission Status:    Inpatient - STAT  BSA:              2.44 m2             Encounter#:          [id number]                                        Department Location: [facility] ICU  Blood Pressure: 127 /61 mmHg    Study Type:    TRANSTHORACIC ECHO (TTE) COMPLETE  Diagnosis/ICD: Unspecified atrial fibrillation-I48.91  Indication:    Afib  CPT Code:      Echo Complete w Full Doppler-55000    Patient History:  Pertinent History: A-Fib, CAD and As, Elev. trop.    Study Detail: The following Echo studies were performed: 2D, M-Mode, Doppler and                color flow. Technically challenging study due to patient lying in                supine position and body habitus. Definity used as a contrast                agent for endocardial border definition. Total contrast used for                this procedure was 1.5 mL via IV push. Unable to obtain LA, RA                janiya. and subcostal view. The patient was asleep.       PHYSICIAN INTERPRETATION:  Left Ventricle: The left ventricular systolic function probably normal. However accurate estimated can not be done due to poor image quality. The left ventricular cavity size is normal.  There is mildly increased left ventricular posterior wall thickness. Left ventricular diastolic filling was indeterminate.  Left Atrium: The left atrium was not well visualized.  Right Ventricle: The right ventricle is normal in size. There is normal right ventricular global systolic function.  Right Atrium: The right atrium was not well visualized.  Aortic Valve: The aortic valve was not well visualized. There is moderate aortic valve cusp calcification. There is evidence of moderate aortic valve stenosis.  There is no evidence of aortic valve regurgitation. The peak instantaneous gradient of the aortic valve is 34.3 mmHg. The mean gradient of the aortic valve is 19.0 mmHg.  Mitral Valve: The mitral valve is mild to moderately thickened. There is mild to moderate mitral annular calcification. There is trace mitral valve regurgitation.  Tricuspid Valve: The tricuspid valve was not well visualized. There is trace to mild tricuspid regurgitation. The right ventricular systolic pressure is unable to be estimated.  Pulmonic Valve: The pulmonic valve is not well visualized. There is trace pulmonic valve regurgitation.  Pericardium: There is no pericardial effusion noted.  Aorta: The aortic root is normal.  In comparison to the previous echocardiogram(s): There are no prior studies on this patient for comparison purposes.       CONCLUSIONS:   1. Left ventricular systolic function probably normal. However accurate estimated can not be done due to poor image quality.   2. Poorly visualized anatomical structures due to suboptimal image quality.   3. Moderate aortic valve stenosis.    QUANTITATIVE DATA SUMMARY:  2D MEASUREMENTS:                           Normal Ranges:  IVSd:          1.11 cm   (0.6-1.1cm)  LVPWd:         1.03 cm   (0.6-1.1cm)  LVIDd:         5.07 cm   (3.9-5.9cm)  LVIDs:         3.98 cm  LV Mass Index: 83.6 g/m2  LV % FS        21.5 %    AORTA MEASUREMENTS:                     Normal Ranges:  Asc Ao, d: 3.00  cm (2.1-3.4cm)    LV DIASTOLIC FUNCTION:                      Normal Ranges:  MV Peak E: 0.89 m/s (0.7-1.2 m/s)  MV Peak A: 0.88 m/s (0.42-0.7 m/s)  E/A Ratio: 1.00     (1.0-2.2)    MITRAL VALVE:                  Normal Ranges:  MV DT: 180 msec (150-240msec)    AORTIC VALVE:                                     Normal Ranges:  AoV Vmax:                2.93 m/s  (<=1.7m/s)  AoV Peak P.3 mmHg (<20mmHg)  AoV Mean P.0 mmHg (1.7-11.5mmHg)  LVOT Max Eddie:            0.75 m/s  (<=1.1m/s)  AoV VTI:                 49.70 cm  (18-25cm)  LVOT VTI:                15.00 cm  LVOT Diameter:           2.30 cm   (1.8-2.4cm)  AoV Area, VTI:           1.25 cm2  (2.5-5.5cm2)  AoV Area,Vmax:           1.07 cm2  (2.5-4.5cm2)  AoV Dimensionless Index: 0.30       RIGHT VENTRICLE:  RV Basal 3.48 cm  RV Mid   3.95 cm  RV Major 8.6 cm  TAPSE:   10.7 mm  RV s'    0.11 m/s    TRICUSPID VALVE/RVSP:                              Normal Ranges:  Peak TR Velocity: 2.23 m/s  RV Syst Pressure: 22.9 mmHg (< 30mmHg)    PULMONIC VALVE:                       Normal Ranges:  PV Max Eddie: 0.7 m/s  (0.6-0.9m/s)  PV Max P.7 mmHg       [id number] Debora Jernigan MD  Electronically signed on 2023 at 12:17:13 PM       ** Final **  CT head wo IV contrast  Narrative: Interpreted By:  Miya Sutton,   STUDY:  CT HEAD WO IV CONTRAST;  2023 10:13 am      INDICATION:  Signs/Symptoms:continued altered mentation.      COMPARISON:  12:04 a.m. the same day      ACCESSION NUMBER(S):  [id number]      ORDERING CLINICIAN:  BECCA RAMOS      TECHNIQUE:  Unenhanced CT images of the head were obtained.      FINDINGS:  Diffuse atrophy with enlargement of the extra-axial spaces and  cerebral sulci similar to the prior exam. Moderate ventriculomegaly  likely out of proportion to the degree of cerebral atrophy also  similar to the prior study. Subtle periventricular white matter  hypodensities consistent with small vessel ischemic disease  again  seen. No acute intracranial hemorrhage or mass-effect. No midline  shift. No extra-axial fluid collection.      No focal calvarial lesion.      Visualized paranasal sinuses are clear.      Impression: No acute intracranial hemorrhage or mass-effect. Moderate  ventriculomegaly likely out of proportion of the degree of cerebral  atrophy and raising question of communicating hydrocephalus again  noted, similar to 2 days prior.      MACRO:  None.      Signed by: Miya Sutton 11/20/2023 11:30 AM  Dictation workstation:   [id number]         Assessment/Plan   This is a 73 y.o. male who presents after being found down at home for an unknown amount of time. Admitted for DKA and AMS to our ICU. On admission, he was noted to have a left anterior lower chest wound with eschar, presumably from his unknown amount of time on the ground. Wound at this time does not appear to need acute surgical intervention. Unstageable at this point, continue to use xeroform daily. May benefit from debridement at some point once wound declares itself more.       Pt seen and discussed with Dr. Chaudhary, in agreement.      Ivy Mandujano PA-C     no

## 2023-11-23 NOTE — CARE PLAN
The patient's goals for the shift include  rest    The clinical goals for the shift include pt will remain hemodynamically stable throughout shift    Over the shift, the patient did not make progress toward the following goals. Barriers to progression include. Recommendations to address these barriers include.

## 2023-11-23 NOTE — PROGRESS NOTES
Jyoti Castro is a 73 y.o. male on day 5 of admission presenting with DKA, type 2, not at goal (CMS/HCC).      Subjective   The patient was transferred from the ICU to floors overnight. There were no acute overnight events.     This morning, the patient was A&O x 1. He was slow to respond to questions and was lethargic. He had a notable cough on exam.     Objective     Last Recorded Vitals  /57 (BP Location: Left arm, Patient Position: Lying)   Pulse 72   Temp 36.5 °C (97.7 °F) (Temporal)   Resp 18   Wt 131 kg (289 lb 11 oz)   SpO2 95%   Intake/Output last 3 Shifts:    Intake/Output Summary (Last 24 hours) at 11/23/2023 1158  Last data filed at 11/23/2023 1000  Gross per 24 hour   Intake 1657.5 ml   Output 1150 ml   Net 507.5 ml       Admission Weight  Weight: 121 kg (266 lb 12.1 oz) (11/18/23 0553)    Daily Weight  11/23/23 : 131 kg (289 lb 11 oz)    Image Results  Transthoracic Echo (TTE) Complete     [facility], [address] [place] [address]                Tel [phone] and Fax [phone]    TRANSTHORACIC ECHOCARDIOGRAM REPORT       Patient Name:     JYOTI CASTRO   Reading Physician:   [id number] Debora Jernigan MD  Study Date:       11/20/2023          Ordering Provider:   [id number] SIMBA FRAGA  MRN/PID:          [id number]            Fellow:  Accession#:       [id number]        Nurse:               Angela Cooper RN  Date of           1950 / 73      Sonographer:         Keyonna Klein RDCS  Birth/Age:        years  Gender:           M                   Additional Staff:  Height:           182.88 cm           Admit Date:          11/18/2023  Weight:           124.74 kg           Admission Status:    Inpatient - STAT  BSA:              2.44 m2             Encounter#:          [id number]                                        Department Location: [facility] ICU  Blood Pressure: 127 /61 mmHg    Study Type:     TRANSTHORACIC ECHO (TTE) COMPLETE  Diagnosis/ICD: Unspecified atrial fibrillation-I48.91  Indication:    Afib  CPT Code:      Echo Complete w Full Doppler-[id number]    Patient History:  Pertinent History: A-Fib, CAD and As, Elev. trop.    Study Detail: The following Echo studies were performed: 2D, M-Mode, Doppler and                color flow. Technically challenging study due to patient lying in                supine position and body habitus. Definity used as a contrast                agent for endocardial border definition. Total contrast used for                this procedure was 1.5 mL via IV push. Unable to obtain LA, RA                janiya. and subcostal view. The patient was asleep.       PHYSICIAN INTERPRETATION:  Left Ventricle: The left ventricular systolic function probably normal. However accurate estimated can not be done due to poor image quality. The left ventricular cavity size is normal. There is mildly increased left ventricular posterior wall thickness. Left ventricular diastolic filling was indeterminate.  Left Atrium: The left atrium was not well visualized.  Right Ventricle: The right ventricle is normal in size. There is normal right ventricular global systolic function.  Right Atrium: The right atrium was not well visualized.  Aortic Valve: The aortic valve was not well visualized. There is moderate aortic valve cusp calcification. There is evidence of moderate aortic valve stenosis.  There is no evidence of aortic valve regurgitation. The peak instantaneous gradient of the aortic valve is 34.3 mmHg. The mean gradient of the aortic valve is 19.0 mmHg.  Mitral Valve: The mitral valve is mild to moderately thickened. There is mild to moderate mitral annular calcification. There is trace mitral valve regurgitation.  Tricuspid Valve: The tricuspid valve was not well visualized. There is trace to mild tricuspid regurgitation. The right ventricular systolic pressure is unable to be  estimated.  Pulmonic Valve: The pulmonic valve is not well visualized. There is trace pulmonic valve regurgitation.  Pericardium: There is no pericardial effusion noted.  Aorta: The aortic root is normal.  In comparison to the previous echocardiogram(s): There are no prior studies on this patient for comparison purposes.       CONCLUSIONS:   1. Left ventricular systolic function probably normal. However accurate estimated can not be done due to poor image quality.   2. Poorly visualized anatomical structures due to suboptimal image quality.   3. Moderate aortic valve stenosis.    QUANTITATIVE DATA SUMMARY:  2D MEASUREMENTS:                           Normal Ranges:  IVSd:          1.11 cm   (0.6-1.1cm)  LVPWd:         1.03 cm   (0.6-1.1cm)  LVIDd:         5.07 cm   (3.9-5.9cm)  LVIDs:         3.98 cm  LV Mass Index: 83.6 g/m2  LV % FS        21.5 %    AORTA MEASUREMENTS:                     Normal Ranges:  Asc Ao, d: 3.00 cm (2.1-3.4cm)    LV DIASTOLIC FUNCTION:                      Normal Ranges:  MV Peak E: 0.89 m/s (0.7-1.2 m/s)  MV Peak A: 0.88 m/s (0.42-0.7 m/s)  E/A Ratio: 1.00     (1.0-2.2)    MITRAL VALVE:                  Normal Ranges:  MV DT: 180 msec (150-240msec)    AORTIC VALVE:                                     Normal Ranges:  AoV Vmax:                2.93 m/s  (<=1.7m/s)  AoV Peak P.3 mmHg (<20mmHg)  AoV Mean P.0 mmHg (1.7-11.5mmHg)  LVOT Max Eddie:            0.75 m/s  (<=1.1m/s)  AoV VTI:                 49.70 cm  (18-25cm)  LVOT VTI:                15.00 cm  LVOT Diameter:           2.30 cm   (1.8-2.4cm)  AoV Area, VTI:           1.25 cm2  (2.5-5.5cm2)  AoV Area,Vmax:           1.07 cm2  (2.5-4.5cm2)  AoV Dimensionless Index: 0.30       RIGHT VENTRICLE:  RV Basal 3.48 cm  RV Mid   3.95 cm  RV Major 8.6 cm  TAPSE:   10.7 mm  RV s'    0.11 m/s    TRICUSPID VALVE/RVSP:                              Normal Ranges:  Peak TR Velocity: 2.23 m/s  RV Syst Pressure: 22.9 mmHg  (< 30mmHg)    PULMONIC VALVE:                       Normal Ranges:  PV Max Eddie: 0.7 m/s  (0.6-0.9m/s)  PV Max P.7 mmHg       [id number] Debora Jernigan MD  Electronically signed on 2023 at 12:17:13 PM       ** Final **  CT head wo IV contrast  Narrative: Interpreted By:  Miya Sutton,   STUDY:  CT HEAD WO IV CONTRAST;  2023 10:13 am      INDICATION:  Signs/Symptoms:continued altered mentation.      COMPARISON:  12:04 a.m. the same day      ACCESSION NUMBER(S):  [id number]      ORDERING CLINICIAN:  BECCA RAMOS      TECHNIQUE:  Unenhanced CT images of the head were obtained.      FINDINGS:  Diffuse atrophy with enlargement of the extra-axial spaces and  cerebral sulci similar to the prior exam. Moderate ventriculomegaly  likely out of proportion to the degree of cerebral atrophy also  similar to the prior study. Subtle periventricular white matter  hypodensities consistent with small vessel ischemic disease again  seen. No acute intracranial hemorrhage or mass-effect. No midline  shift. No extra-axial fluid collection.      No focal calvarial lesion.      Visualized paranasal sinuses are clear.      Impression: No acute intracranial hemorrhage or mass-effect. Moderate  ventriculomegaly likely out of proportion of the degree of cerebral  atrophy and raising question of communicating hydrocephalus again  noted, similar to 2 days prior.      MACRO:  None.      Signed by: Miya Sutton 2023 11:30 AM  Dictation workstation:   [id number]      Physical Exam  Cardiovascular:      Rate and Rhythm: Normal rate and regular rhythm.      Pulses: Normal pulses.      Heart sounds: Normal heart sounds.   Pulmonary:      Effort: Pulmonary effort is normal.      Breath sounds: Normal breath sounds.   Abdominal:      General: Abdomen is flat. Bowel sounds are normal.      Palpations: Abdomen is soft.   Genitourinary:     Comments: External urinary catheter present  Musculoskeletal:      Comments: RUE and LUE: 3/  below the elbow; hasn't been able to move arm at the shoulder b/l  RLE and LLE: 1/5   Skin:     General: Skin is warm and dry.   Neurological:      Mental Status: He is disoriented.      Comments: A&O x 2: oriented to person and time (not place or situation)       Relevant Results  Scheduled medications  apixaban, 5 mg, oral, BID  atorvastatin, 40 mg, oral, Daily  bacitracin, , Topical, TID  ceFAZolin, 1 g, intravenous, q8h  flecainide, 100 mg, oral, BID  guaiFENesin, 1,200 mg, oral, BID  insulin glargine, 50 Units, subcutaneous, BID  insulin lispro, 0-20 Units, subcutaneous, TID with meals  insulin lispro, 10 Units, subcutaneous, TID with meals  metoprolol tartrate, 25 mg, oral, BID  nystatin, 5 mL, Swish & Spit, 4x daily      Continuous medications       PRN medications  PRN medications: acetaminophen, dextrose 10 % in water (D10W), dextrose, glucagon, oxygen    Results for orders placed or performed during the hospital encounter of 11/18/23 (from the past 24 hour(s))   POCT GLUCOSE   Result Value Ref Range    POCT Glucose 163 (H) 74 - 99 mg/dL   POCT GLUCOSE   Result Value Ref Range    POCT Glucose 148 (H) 74 - 99 mg/dL   Renal function panel   Result Value Ref Range    Glucose 127 (H) 74 - 99 mg/dL    Sodium 143 136 - 145 mmol/L    Potassium 3.5 3.5 - 5.3 mmol/L    Chloride 107 98 - 107 mmol/L    Bicarbonate 27 21 - 32 mmol/L    Anion Gap 13 10 - 20 mmol/L    Urea Nitrogen 16 6 - 23 mg/dL    Creatinine 0.98 0.50 - 1.30 mg/dL    eGFR 82 >60 mL/min/1.73m*2    Calcium 8.4 (L) 8.6 - 10.3 mg/dL    Phosphorus 3.0 2.5 - 4.9 mg/dL    Albumin 2.9 (L) 3.4 - 5.0 g/dL   Magnesium   Result Value Ref Range    Magnesium 1.80 1.60 - 2.40 mg/dL   CBC   Result Value Ref Range    WBC 9.0 4.4 - 11.3 x10*3/uL    nRBC 0.0 0.0 - 0.0 /100 WBCs    RBC 4.18 (L) 4.50 - 5.90 x10*6/uL    Hemoglobin 10.0 (L) 13.5 - 17.5 g/dL    Hematocrit 34.0 (L) 41.0 - 52.0 %    MCV 81 80 - 100 fL    MCH 23.9 (L) 26.0 - 34.0 pg    MCHC 29.4 (L) 32.0 - 36.0  g/dL    RDW 15.4 (H) 11.5 - 14.5 %    Platelets 205 150 - 450 x10*3/uL   POCT GLUCOSE   Result Value Ref Range    POCT Glucose 138 (H) 74 - 99 mg/dL     Transthoracic Echo (TTE) Complete    Result Date: 11/20/2023   [facility], [address] [address]               Tel [phone] and Fax [phone] TRANSTHORACIC ECHOCARDIOGRAM REPORT  Patient Name:     JYOTI LYLA Nash Physician:   [id number] Debora Jernigan MD Study Date:       11/20/2023          Ordering Provider:   [id number] SIMBA FRAGA MRN/PID:          [id number]            Fellow: Accession#:       [id number]        Nurse:               Angela Cooper RN Date of           1950 / 73      Sonographer:         Keyonna Klein RDCS Birth/Age:        years Gender:           M                   Additional Staff: Height:           182.88 cm           Admit Date:          11/18/2023 Weight:           124.74 kg           Admission Status:    Inpatient - STAT BSA:              2.44 m2             Encounter#:          [id number]                                       Department Location: [facility] ICU Blood Pressure: 127 /61 mmHg Study Type:    TRANSTHORACIC ECHO (TTE) COMPLETE Diagnosis/ICD: Unspecified atrial fibrillation-I48.91 Indication:    Afib CPT Code:      Echo Complete w Full Doppler-[id number] Patient History: Pertinent History: A-Fib, CAD and As, Elev. trop. Study Detail: The following Echo studies were performed: 2D, M-Mode, Doppler and               color flow. Technically challenging study due to patient lying in               supine position and body habitus. Definity used as a contrast               agent for endocardial border definition. Total contrast used for               this procedure was 1.5 mL via IV push. Unable to obtain LA, RA               janiya. and subcostal view. The patient was asleep.  PHYSICIAN INTERPRETATION: Left Ventricle: The left ventricular  systolic function probably normal. However accurate estimated can not be done due to poor image quality. The left ventricular cavity size is normal. There is mildly increased left ventricular posterior wall thickness. Left ventricular diastolic filling was indeterminate. Left Atrium: The left atrium was not well visualized. Right Ventricle: The right ventricle is normal in size. There is normal right ventricular global systolic function. Right Atrium: The right atrium was not well visualized. Aortic Valve: The aortic valve was not well visualized. There is moderate aortic valve cusp calcification. There is evidence of moderate aortic valve stenosis. There is no evidence of aortic valve regurgitation. The peak instantaneous gradient of the aortic valve is 34.3 mmHg. The mean gradient of the aortic valve is 19.0 mmHg. Mitral Valve: The mitral valve is mild to moderately thickened. There is mild to moderate mitral annular calcification. There is trace mitral valve regurgitation. Tricuspid Valve: The tricuspid valve was not well visualized. There is trace to mild tricuspid regurgitation. The right ventricular systolic pressure is unable to be estimated. Pulmonic Valve: The pulmonic valve is not well visualized. There is trace pulmonic valve regurgitation. Pericardium: There is no pericardial effusion noted. Aorta: The aortic root is normal. In comparison to the previous echocardiogram(s): There are no prior studies on this patient for comparison purposes.  CONCLUSIONS:  1. Left ventricular systolic function probably normal. However accurate estimated can not be done due to poor image quality.  2. Poorly visualized anatomical structures due to suboptimal image quality.  3. Moderate aortic valve stenosis. QUANTITATIVE DATA SUMMARY: 2D MEASUREMENTS:                          Normal Ranges: IVSd:          1.11 cm   (0.6-1.1cm) LVPWd:         1.03 cm   (0.6-1.1cm) LVIDd:         5.07 cm   (3.9-5.9cm) LVIDs:         3.98 cm LV  Mass Index: 83.6 g/m2 LV % FS        21.5 % AORTA MEASUREMENTS:                    Normal Ranges: Asc Ao, d: 3.00 cm (2.1-3.4cm) LV DIASTOLIC FUNCTION:                     Normal Ranges: MV Peak E: 0.89 m/s (0.7-1.2 m/s) MV Peak A: 0.88 m/s (0.42-0.7 m/s) E/A Ratio: 1.00     (1.0-2.2) MITRAL VALVE:                 Normal Ranges: MV DT: 180 msec (150-240msec) AORTIC VALVE:                                    Normal Ranges: AoV Vmax:                2.93 m/s  (<=1.7m/s) AoV Peak P.3 mmHg (<20mmHg) AoV Mean P.0 mmHg (1.7-11.5mmHg) LVOT Max Eddie:            0.75 m/s  (<=1.1m/s) AoV VTI:                 49.70 cm  (18-25cm) LVOT VTI:                15.00 cm LVOT Diameter:           2.30 cm   (1.8-2.4cm) AoV Area, VTI:           1.25 cm2  (2.5-5.5cm2) AoV Area,Vmax:           1.07 cm2  (2.5-4.5cm2) AoV Dimensionless Index: 0.30  RIGHT VENTRICLE: RV Basal 3.48 cm RV Mid   3.95 cm RV Major 8.6 cm TAPSE:   10.7 mm RV s'    0.11 m/s TRICUSPID VALVE/RVSP:                             Normal Ranges: Peak TR Velocity: 2.23 m/s RV Syst Pressure: 22.9 mmHg (< 30mmHg) PULMONIC VALVE:                      Normal Ranges: PV Max Eddie: 0.7 m/s  (0.6-0.9m/s) PV Max P.7 mmHg  [id number] Debora Jernigan MD Electronically signed on 2023 at 12:17:13 PM  ** Final **     CT head wo IV contrast    Result Date: 2023  Interpreted By:  Miya Sutton, STUDY: CT HEAD WO IV CONTRAST;  2023 10:13 am   INDICATION: Signs/Symptoms:continued altered mentation.   COMPARISON: 12:04 a.m. the same day   ACCESSION NUMBER(S): [id number]   ORDERING CLINICIAN: BECCA RAMOS   TECHNIQUE: Unenhanced CT images of the head were obtained.   FINDINGS: Diffuse atrophy with enlargement of the extra-axial spaces and cerebral sulci similar to the prior exam. Moderate ventriculomegaly likely out of proportion to the degree of cerebral atrophy also similar to the prior study. Subtle periventricular white matter hypodensities  consistent with small vessel ischemic disease again seen. No acute intracranial hemorrhage or mass-effect. No midline shift. No extra-axial fluid collection.   No focal calvarial lesion.   Visualized paranasal sinuses are clear.       No acute intracranial hemorrhage or mass-effect. Moderate ventriculomegaly likely out of proportion of the degree of cerebral atrophy and raising question of communicating hydrocephalus again noted, similar to 2 days prior.   MACRO: None.   Signed by: Miya Sutton 11/20/2023 11:30 AM Dictation workstation:   [id number]    XR chest 1 view    Result Date: 11/19/2023  Interpreted By:  Josias Marie, STUDY: XR CHEST 1 VIEW;  11/19/2023 9:05 pm   INDICATION: Signs/Symptoms:Fever, increased rhonci.   COMPARISON: 11/18/2023   ACCESSION NUMBER(S): [id number]   ORDERING CLINICIAN: KHADIJAH DOMINGUEZ   FINDINGS: There is redemonstration of left ventriculomegaly. The aorta is atherosclerotic. Low lung volumes results in basilar and perihilar bronchovascular crowding. There are nonspecific opacities at the medial lung bases that are at least in part atelectatic at the right lung base due to low lung volumes. There is also patchy airspace disease in the medial left lower lobe that appears ostensibly new from 11/18/2023 CT chest abdomen pelvis.         Probable new airspace disease in the medial left lower lobe compared to prior study that may relate to atelectasis or aspiration pneumonitis. Similarly, there is new perihilar airspace disease that is likely in part atelectatic, but could also be caused by aspiration and postobstructive volume loss. CT chest recommended to further evaluate.   MACRO: None.   Signed by: Josias Marie 11/19/2023 9:32 PM Dictation workstation:   [id number]    US renal complete    Result Date: 11/18/2023  Interpreted By:  Daria Mckinney, STUDY: US RENAL COMPLETE; 11/18/2023 1:55 pm   INDICATION: Concern for hemorrhagic renal cyst.   COMPARISON: CT chest, abdomen and  pelvis 11/17/2023   ACCESSION NUMBER(S): [id number]   ORDERING CLINICIAN: CRYSTAL JULIAN   TECHNIQUE: Sonography of the kidneys and urinary bladder was performed.   FINDINGS: Limited examination due to portable technique and patient's immobility.   Right Kidney: *Renal length: 10.2 cm *Parenchyma: Normal parenchymal echogenicity. Normal parenchymal thickness. *Collecting system: No hydronephrosis. *Calculus: No echogenic, shadowing calculus. *Lesion: Previously shown renal cysts on the prior CT not visualized.     Left Kidney: *Renal length: 10.6 cm *Parenchyma: Normal parenchymal echogenicity. Normal parenchymal thickness. *Collecting system: No hydronephrosis. *Calculus: No echogenic, shadowing calculus. *Lesion: Previously shown renal cyst on the prior CT not visualized.   Bladder: Not visualized.       Limited examination due to portable technique and patient's immobility. Previously shown renal cysts on the prior CT not visualized.   No hydronephrosis.   MACRO: None   Signed by: Daria Mckinney 11/18/2023 2:18 PM Dictation workstation:   [id number]    CT thoracic spine wo IV contrast    Result Date: 11/18/2023  Interpreted By:  Porsche Read, STUDY: CT THORACIC SPINE WO IV CONTRAST; CT LUMBAR SPINE WO IV CONTRAST; 11/18/2023 12:27 am   INDICATION: Signs/Symptoms:fall.   COMPARISON: Same day CT of the chest, abdomen and pelvis.   ACCESSION NUMBER(S): [id number]; [id number]   ORDERING CLINICIAN: ROWAN AVILA   TECHNIQUE: Axial CT images of the thoracic and lumbar spine are obtained. Axial, coronal and sagittal reconstructions are submitted for review.   FINDINGS: THORACIC SPINE:   Thoracic vertebral alignment is maintained, without significant spondylolisthesis.   Multilevel insufficiency endplate changes with small Schmorl's nodes are present in the thoracic spine along the inferior endplates of T6, T7, T8 and T9, without evidence of compression fractures.   Posterior elements of the thoracic spine  do not demonstrate any evidence of acute trauma. Well corticated ossific fragments along the spinous processes are T3, T4 and T5 likely represent degenerative changes or sequela of remote injuries.   Mild intervertebral disc height loss is present in the mid and lower thoracic spine.   Although the exam is not optimized to assess the spinal canal, no high-grade spinal canal stenosis is identified.   Paraspinal musculature is unremarkable in appearance.   LUMBAR SPINE:   There are 5 lumbar type non rib-bearing vertebral bodies, with lowest well-formed intervertebral disc space labeled L5-S1.   No compression fracture is identified in the lumbar spine, although multilevel insufficiency endplate changes are present, with Schmorl's nodes noted along the inferior endplates of L4 and L5 and superior endplates of L5 and S1.   Posterior elements of the lumbar spine do not demonstrate any evidence of acute trauma. No abnormal intra spinous distance widening or displaced transverse or spinous process fractures are identified.   Moderate intervertebral disc height loss is present at L5-S1.   Although the exam is not optimized to assess the spinal canal, no high-grade stenosis is identified, likely mild spinal canal narrowing is present at L3-L4 and L4-L5 due to disc osteophyte complexes and ligamentum flavum thickening.   Paraspinal musculature does not demonstrate any acute abnormalities.       1.  No evidence of acute trauma or high-grade stenosis in the thoracic or lumbar spine. 2. Likely mild spinal canal narrowing is present at the levels of L3-L4 and L4-L5 due to disc osteophyte complexes.   MACRO: None   Signed by: Porsche Read 11/18/2023 1:14 AM Dictation workstation:   [id number]    CT lumbar spine wo IV contrast    Result Date: 11/18/2023  Interpreted By:  Porsche Read, STUDY: CT THORACIC SPINE WO IV CONTRAST; CT LUMBAR SPINE WO IV CONTRAST; 11/18/2023 12:27 am   INDICATION: Signs/Symptoms:fall.    COMPARISON: Same day CT of the chest, abdomen and pelvis.   ACCESSION NUMBER(S): [id number]; [id number]   ORDERING CLINICIAN: ROWAN AVILA   TECHNIQUE: Axial CT images of the thoracic and lumbar spine are obtained. Axial, coronal and sagittal reconstructions are submitted for review.   FINDINGS: THORACIC SPINE:   Thoracic vertebral alignment is maintained, without significant spondylolisthesis.   Multilevel insufficiency endplate changes with small Schmorl's nodes are present in the thoracic spine along the inferior endplates of T6, T7, T8 and T9, without evidence of compression fractures.   Posterior elements of the thoracic spine do not demonstrate any evidence of acute trauma. Well corticated ossific fragments along the spinous processes are T3, T4 and T5 likely represent degenerative changes or sequela of remote injuries.   Mild intervertebral disc height loss is present in the mid and lower thoracic spine.   Although the exam is not optimized to assess the spinal canal, no high-grade spinal canal stenosis is identified.   Paraspinal musculature is unremarkable in appearance.   LUMBAR SPINE:   There are 5 lumbar type non rib-bearing vertebral bodies, with lowest well-formed intervertebral disc space labeled L5-S1.   No compression fracture is identified in the lumbar spine, although multilevel insufficiency endplate changes are present, with Schmorl's nodes noted along the inferior endplates of L4 and L5 and superior endplates of L5 and S1.   Posterior elements of the lumbar spine do not demonstrate any evidence of acute trauma. No abnormal intra spinous distance widening or displaced transverse or spinous process fractures are identified.   Moderate intervertebral disc height loss is present at L5-S1.   Although the exam is not optimized to assess the spinal canal, no high-grade stenosis is identified, likely mild spinal canal narrowing is present at L3-L4 and L4-L5 due to disc osteophyte complexes and  ligamentum flavum thickening.   Paraspinal musculature does not demonstrate any acute abnormalities.       1.  No evidence of acute trauma or high-grade stenosis in the thoracic or lumbar spine. 2. Likely mild spinal canal narrowing is present at the levels of L3-L4 and L4-L5 due to disc osteophyte complexes.   MACRO: None   Signed by: Porsche Read 11/18/2023 1:14 AM Dictation workstation:   [id number]    CT chest abdomen pelvis wo IV contrast    Result Date: 11/18/2023  Interpreted By:  Porsche Read, STUDY: CT CHEST ABDOMEN PELVIS WO CONTRAST;  11/18/2023 12:27 am   INDICATION: Signs/Symptoms:pain.   COMPARISON: None.   ACCESSION NUMBER(S): [id number]   ORDERING CLINICIAN: ROWAN AVILA   TECHNIQUE: CT of the chest, abdomen and pelvis was performed. Contiguous axial images were obtained at 3 mm slice thickness through the chest, abdomen and pelvis. Coronal and sagittal reconstructions at 3 mm slice thickness were performed.  No intravenous or oral contrast agents were administered.   FINDINGS: Please note that the study is limited without intravenous contrast.   CHEST:     LUNG/PLEURA/LARGE AIRWAYS: Trachea and central airways are patent, without evidence of endobronchial lesion. There is a diverticulum arising from right mainstem bronchus projecting into the subcarinal region.   Within limitations of somewhat motion degraded exam, no pleural effusions, consolidation or pneumothorax is evident.   Several tiny 2-3 mm nodules are present in the lungs bilaterally. No suspicious lung masses are identified.   VESSELS: Aorta and pulmonary arteries are normal caliber.  Moderate vascular calcifications are present in the abdominal aorta. Moderate coronary artery calcifications are present.   HEART: Heart is normal in size with pericardial calcifications present. No pericardial effusion is noted.   MEDIASTINUM AND DUDLEY: No mediastinal, hilar or axillary lymph nodes are present.  The esophagus is  unremarkable in appearance. There is no evidence of pneumomediastinum.   CHEST WALL AND LOWER NECK: Soft tissues of the chest wall do not demonstrate any acute abnormalities. No displaced rib fractures or other osseous injuries are identified.   ABDOMEN:   LIVER: Liver is normal in size and within limits of noncontrast exam does not demonstrate any acute abnormalities. No perihepatic free fluid is evident.   BILE DUCTS: No intrahepatic or extrahepatic biliary dilatation is present.   GALLBLADDER: Gallbladder is unremarkable in appearance.   PANCREAS: No pancreatic ductal dilatation or peripancreatic stranding is evident.   SPLEEN: Spleen is unremarkable in appearance.   ADRENAL GLANDS: Adrenal glands are unremarkable.   KIDNEYS AND URETERS: Kidneys are symmetric in size without evidence of hydronephrosis bilaterally. Several lobulated low-density lesions arise from the right kidney, possibly representing cysts. No ureteral dilatation is evident.   PELVIS:   BLADDER: Bladder is unremarkable in appearance.   REPRODUCTIVE ORGANS: Prostate is enlarged.   BOWEL: Small bowel is nondilated. No inflammatory wall thickening or abnormal dilatation is present in the large bowel. Appendix is unremarkable in appearance in the right lower quadrant.   Scattered diverticula are present in the sigmoid colon without evidence of acute diverticulitis.   VESSELS: There is no aneurysmal dilatation of the abdominal aorta. The IVC is within normal limits. Extensive vascular calcifications are present in the abdominal aorta.   PERITONEUM/RETROPERITONEUM/LYMPH NODES: There is no evidence of free air, free fluid, or thick-walled collections in the abdomen and pelvis. No enlarged lymphadenopathy is evident.   ABDOMINAL WALL: The abdominal wall soft tissues appear normal.   BONES: Please refer to separately dictated CT of the thoracic and lumbar spine for further characterization of spinal findings.   No acute osseous abnormality is identified  in the pelvis.       1. No evidence of acute abnormality is present in the chest, abdomen or pelvis. No evidence of acute trauma. 2. Moderate coronary artery calcifications, with pericardial calcifications. Correlate with restrictive pericarditis. 3. Several 2-3 mm solid nodules are present in the upper lungs bilaterally, likely benign by size criteria if there is no history of malignancy, although if patient is has risk factors for lung cancer, yearly screening CT is warranted. 4. Scattered diverticula are present in the sigmoid colon without evidence of acute diverticulitis. 5. Low-density lobulated lesions in the lower pole of the right kidney are incompletely characterized on current exam, although cyst cystically likely represent cysts. Slightly more hyperdense exophytic lesion arising from the lower pole of the left kidney may represent a proteinaceous or hemorrhagic cyst but is incompletely characterized on study. 6. Additional findings as detailed above.     MACRO: None   Signed by: Porsche Read 11/18/2023 1:05 AM Dictation workstation:   [id number]    CT head wo IV contrast    Result Date: 11/18/2023  Interpreted By:  Porsche Read, STUDY: CT HEAD WO IV CONTRAST; CT CERVICAL SPINE WO IV CONTRAST; CT FACIAL BONES WO IV CONTRAST; CT 3D RECONSTRUCTION;  11/18/2023 12:28 am   INDICATION: Signs/Symptoms:altered mental state; Signs/Symptoms:pain; Signs/Symptoms:contusion; Signs/Symptoms:possible fall contusion.   COMPARISON: None.   ACCESSION NUMBER(S): [id number]; [id number]; [id number]; [id number]   ORDERING CLINICIAN: ROWAN AVILA   TECHNIQUE: Noncontrast axial CT scan of head was performed, with coronal and sagittal reformats provided. The images were reviewed in bone, brain, blood and soft tissue windows.   Thin cut axial CT images through the facial bones were obtained and reconstructed in the coronal and sagittal plane. 3D reconstruction was created on a dedicated workstation and  provided for interpretation.   Axial CT images of the cervical spine are obtained. Axial, coronal and sagittal reconstructions are provided for review.   FINDINGS: CT HEAD:   No hyperdense intracranial hemorrhage is evident. There is no mass effect or midline shift.   Gray-white differentiation is intact, without evidence of CT apparent transcortical infarct.   There is somewhat disproportionate enlargement of the supratentorial ventricular system relative to basal cisterns nonspecific finding that may represent asymmetric central volume loss or normal pressure hydrocephalus. No abnormal extra-axial fluid collections are identified. Basal cisterns are patent.   Scalp soft tissues do not demonstrate any acute abnormalities. Calvarium is unremarkable in appearance without evidence of depressed skull fracture. Mastoid air cells and middle ear cavities are well aerated without evidence of fluid fluid levels.   CT FACIAL BONES:   Mild left supraorbital and periorbital soft tissue swelling and edema is present, without evidence of associated fracture. Bony orbits are intact.   Intraorbital structures are symmetric in appearance without evidence of acute trauma.   Facial bones appear intact without evidence of displaced fracture. Nasal bones are unremarkable in appearance.   Visualized paranasal sinuses are well aerated without evidence of fluid fluid levels.   Soft tissues overlying the face and skull base do not demonstrate any acute abnormality.   There are numerous missing teeth, with dental cavities and periapical lucencies present in the remaining lower incisors. Temporomandibular joints are intact. No trauma to the oral cavity is identified.   Buccal soft tissues do not demonstrate any acute abnormalities. No fluid collections or soft tissue gas is identified. Parotid and submandibular glands are unremarkable in appearance.   CT C-SPINE:   There is straightening of normal lordotic curvature of the cervical spine,  with a 1-2 mm anterolisthesis of C4 on C5.   No compression fractures are identified in the cervical spine, although mild insufficiency endplate changes with associated Schmorl's nodes are present along the inferior endplate of C6 and superior endplate of C7.   Posterior elements of the cervical spine do not demonstrate any evidence of acute trauma. No abnormal intra spinous distance widening or displaced transverse or spinous process fractures are identified.   Craniocervical junction is intact. Facet joints are preserved with mild degenerate facet osteoarthropathy present at several levels, most pronounced at C3-C4 and C4-C5 on the left.   Mild intervertebral disc height loss is present at C4-C5 and C6-C7.   No high-grade spinal canal stenosis is identified. Mild spinal canal narrowing is possible at the level of C3-C4 due to mild disc bulge and ligamentum flavum thickening with slight effacement of the cervical spinal cord.   Mild neural foraminal narrowing is present at C4-C5 on the left due to endplate spurring and hypertrophic facet changes.   Prevertebral and paraspinal soft tissues do not demonstrate any acute abnormalities.       CT HEAD: 1. No evidence of hemorrhage, depressed skull fracture, or other acute intracranial trauma. 2. Disproportionate enlargement of the supratentorial ventricular system relative to basal cisterns and sulci, nonspecific finding that may represent asymmetric central volume loss or normal pressure hydrocephalus.   CT FACIAL BONES: 1. Left periorbital and supraorbital soft tissue swelling, without evidence of displaced orbital or facial bone fracture. 2. Large dental cavities and periapical lucencies are present in the few remaining lower incisors. Correlate with dental exam.   CT C-SPINE: 1. No evidence of acute trauma to the cervical spine.   MACRO: None   Signed by: Porsche Read 11/18/2023 12:49 AM Dictation workstation:   [id number]    CT cervical spine wo IV  contrast    Result Date: 11/18/2023  Interpreted By:  Porsche Read, STUDY: CT HEAD WO IV CONTRAST; CT CERVICAL SPINE WO IV CONTRAST; CT FACIAL BONES WO IV CONTRAST; CT 3D RECONSTRUCTION;  11/18/2023 12:28 am   INDICATION: Signs/Symptoms:altered mental state; Signs/Symptoms:pain; Signs/Symptoms:contusion; Signs/Symptoms:possible fall contusion.   COMPARISON: None.   ACCESSION NUMBER(S): [id number]; [id number]; [id number]; [id number]   ORDERING CLINICIAN: ROWAN AVILA   TECHNIQUE: Noncontrast axial CT scan of head was performed, with coronal and sagittal reformats provided. The images were reviewed in bone, brain, blood and soft tissue windows.   Thin cut axial CT images through the facial bones were obtained and reconstructed in the coronal and sagittal plane. 3D reconstruction was created on a dedicated workstation and provided for interpretation.   Axial CT images of the cervical spine are obtained. Axial, coronal and sagittal reconstructions are provided for review.   FINDINGS: CT HEAD:   No hyperdense intracranial hemorrhage is evident. There is no mass effect or midline shift.   Gray-white differentiation is intact, without evidence of CT apparent transcortical infarct.   There is somewhat disproportionate enlargement of the supratentorial ventricular system relative to basal cisterns nonspecific finding that may represent asymmetric central volume loss or normal pressure hydrocephalus. No abnormal extra-axial fluid collections are identified. Basal cisterns are patent.   Scalp soft tissues do not demonstrate any acute abnormalities. Calvarium is unremarkable in appearance without evidence of depressed skull fracture. Mastoid air cells and middle ear cavities are well aerated without evidence of fluid fluid levels.   CT FACIAL BONES:   Mild left supraorbital and periorbital soft tissue swelling and edema is present, without evidence of associated fracture. Bony orbits are intact.   Intraorbital  structures are symmetric in appearance without evidence of acute trauma.   Facial bones appear intact without evidence of displaced fracture. Nasal bones are unremarkable in appearance.   Visualized paranasal sinuses are well aerated without evidence of fluid fluid levels.   Soft tissues overlying the face and skull base do not demonstrate any acute abnormality.   There are numerous missing teeth, with dental cavities and periapical lucencies present in the remaining lower incisors. Temporomandibular joints are intact. No trauma to the oral cavity is identified.   Buccal soft tissues do not demonstrate any acute abnormalities. No fluid collections or soft tissue gas is identified. Parotid and submandibular glands are unremarkable in appearance.   CT C-SPINE:   There is straightening of normal lordotic curvature of the cervical spine, with a 1-2 mm anterolisthesis of C4 on C5.   No compression fractures are identified in the cervical spine, although mild insufficiency endplate changes with associated Schmorl's nodes are present along the inferior endplate of C6 and superior endplate of C7.   Posterior elements of the cervical spine do not demonstrate any evidence of acute trauma. No abnormal intra spinous distance widening or displaced transverse or spinous process fractures are identified.   Craniocervical junction is intact. Facet joints are preserved with mild degenerate facet osteoarthropathy present at several levels, most pronounced at C3-C4 and C4-C5 on the left.   Mild intervertebral disc height loss is present at C4-C5 and C6-C7.   No high-grade spinal canal stenosis is identified. Mild spinal canal narrowing is possible at the level of C3-C4 due to mild disc bulge and ligamentum flavum thickening with slight effacement of the cervical spinal cord.   Mild neural foraminal narrowing is present at C4-C5 on the left due to endplate spurring and hypertrophic facet changes.   Prevertebral and paraspinal soft  tissues do not demonstrate any acute abnormalities.       CT HEAD: 1. No evidence of hemorrhage, depressed skull fracture, or other acute intracranial trauma. 2. Disproportionate enlargement of the supratentorial ventricular system relative to basal cisterns and sulci, nonspecific finding that may represent asymmetric central volume loss or normal pressure hydrocephalus.   CT FACIAL BONES: 1. Left periorbital and supraorbital soft tissue swelling, without evidence of displaced orbital or facial bone fracture. 2. Large dental cavities and periapical lucencies are present in the few remaining lower incisors. Correlate with dental exam.   CT C-SPINE: 1. No evidence of acute trauma to the cervical spine.   MACRO: None   Signed by: Porsche Read 11/18/2023 12:49 AM Dictation workstation:   [id number]    CT maxillofacial bones wo IV contrast    Result Date: 11/18/2023  Interpreted By:  Porsche Read, STUDY: CT HEAD WO IV CONTRAST; CT CERVICAL SPINE WO IV CONTRAST; CT FACIAL BONES WO IV CONTRAST; CT 3D RECONSTRUCTION;  11/18/2023 12:28 am   INDICATION: Signs/Symptoms:altered mental state; Signs/Symptoms:pain; Signs/Symptoms:contusion; Signs/Symptoms:possible fall contusion.   COMPARISON: None.   ACCESSION NUMBER(S): [id number]; [id number]; [id number]; [id number]   ORDERING CLINICIAN: ROWAN AVILA   TECHNIQUE: Noncontrast axial CT scan of head was performed, with coronal and sagittal reformats provided. The images were reviewed in bone, brain, blood and soft tissue windows.   Thin cut axial CT images through the facial bones were obtained and reconstructed in the coronal and sagittal plane. 3D reconstruction was created on a dedicated workstation and provided for interpretation.   Axial CT images of the cervical spine are obtained. Axial, coronal and sagittal reconstructions are provided for review.   FINDINGS: CT HEAD:   No hyperdense intracranial hemorrhage is evident. There is no mass effect or  midline shift.   Gray-white differentiation is intact, without evidence of CT apparent transcortical infarct.   There is somewhat disproportionate enlargement of the supratentorial ventricular system relative to basal cisterns nonspecific finding that may represent asymmetric central volume loss or normal pressure hydrocephalus. No abnormal extra-axial fluid collections are identified. Basal cisterns are patent.   Scalp soft tissues do not demonstrate any acute abnormalities. Calvarium is unremarkable in appearance without evidence of depressed skull fracture. Mastoid air cells and middle ear cavities are well aerated without evidence of fluid fluid levels.   CT FACIAL BONES:   Mild left supraorbital and periorbital soft tissue swelling and edema is present, without evidence of associated fracture. Bony orbits are intact.   Intraorbital structures are symmetric in appearance without evidence of acute trauma.   Facial bones appear intact without evidence of displaced fracture. Nasal bones are unremarkable in appearance.   Visualized paranasal sinuses are well aerated without evidence of fluid fluid levels.   Soft tissues overlying the face and skull base do not demonstrate any acute abnormality.   There are numerous missing teeth, with dental cavities and periapical lucencies present in the remaining lower incisors. Temporomandibular joints are intact. No trauma to the oral cavity is identified.   Buccal soft tissues do not demonstrate any acute abnormalities. No fluid collections or soft tissue gas is identified. Parotid and submandibular glands are unremarkable in appearance.   CT C-SPINE:   There is straightening of normal lordotic curvature of the cervical spine, with a 1-2 mm anterolisthesis of C4 on C5.   No compression fractures are identified in the cervical spine, although mild insufficiency endplate changes with associated Schmorl's nodes are present along the inferior endplate of C6 and superior endplate  of C7.   Posterior elements of the cervical spine do not demonstrate any evidence of acute trauma. No abnormal intra spinous distance widening or displaced transverse or spinous process fractures are identified.   Craniocervical junction is intact. Facet joints are preserved with mild degenerate facet osteoarthropathy present at several levels, most pronounced at C3-C4 and C4-C5 on the left.   Mild intervertebral disc height loss is present at C4-C5 and C6-C7.   No high-grade spinal canal stenosis is identified. Mild spinal canal narrowing is possible at the level of C3-C4 due to mild disc bulge and ligamentum flavum thickening with slight effacement of the cervical spinal cord.   Mild neural foraminal narrowing is present at C4-C5 on the left due to endplate spurring and hypertrophic facet changes.   Prevertebral and paraspinal soft tissues do not demonstrate any acute abnormalities.       CT HEAD: 1. No evidence of hemorrhage, depressed skull fracture, or other acute intracranial trauma. 2. Disproportionate enlargement of the supratentorial ventricular system relative to basal cisterns and sulci, nonspecific finding that may represent asymmetric central volume loss or normal pressure hydrocephalus.   CT FACIAL BONES: 1. Left periorbital and supraorbital soft tissue swelling, without evidence of displaced orbital or facial bone fracture. 2. Large dental cavities and periapical lucencies are present in the few remaining lower incisors. Correlate with dental exam.   CT C-SPINE: 1. No evidence of acute trauma to the cervical spine.   MACRO: None   Signed by: Porsche Read 11/18/2023 12:49 AM Dictation workstation:   [id number]    CT 3D reconstruction    Result Date: 11/18/2023  Interpreted By:  Porsche Read, STUDY: CT HEAD WO IV CONTRAST; CT CERVICAL SPINE WO IV CONTRAST; CT FACIAL BONES WO IV CONTRAST; CT 3D RECONSTRUCTION;  11/18/2023 12:28 am   INDICATION: Signs/Symptoms:altered mental state;  Signs/Symptoms:pain; Signs/Symptoms:contusion; Signs/Symptoms:possible fall contusion.   COMPARISON: None.   ACCESSION NUMBER(S): [id number]; [id number]; [id number]; [id number]   ORDERING CLINICIAN: ROWAN AVILA   TECHNIQUE: Noncontrast axial CT scan of head was performed, with coronal and sagittal reformats provided. The images were reviewed in bone, brain, blood and soft tissue windows.   Thin cut axial CT images through the facial bones were obtained and reconstructed in the coronal and sagittal plane. 3D reconstruction was created on a dedicated workstation and provided for interpretation.   Axial CT images of the cervical spine are obtained. Axial, coronal and sagittal reconstructions are provided for review.   FINDINGS: CT HEAD:   No hyperdense intracranial hemorrhage is evident. There is no mass effect or midline shift.   Gray-white differentiation is intact, without evidence of CT apparent transcortical infarct.   There is somewhat disproportionate enlargement of the supratentorial ventricular system relative to basal cisterns nonspecific finding that may represent asymmetric central volume loss or normal pressure hydrocephalus. No abnormal extra-axial fluid collections are identified. Basal cisterns are patent.   Scalp soft tissues do not demonstrate any acute abnormalities. Calvarium is unremarkable in appearance without evidence of depressed skull fracture. Mastoid air cells and middle ear cavities are well aerated without evidence of fluid fluid levels.   CT FACIAL BONES:   Mild left supraorbital and periorbital soft tissue swelling and edema is present, without evidence of associated fracture. Bony orbits are intact.   Intraorbital structures are symmetric in appearance without evidence of acute trauma.   Facial bones appear intact without evidence of displaced fracture. Nasal bones are unremarkable in appearance.   Visualized paranasal sinuses are well aerated without evidence of fluid fluid  levels.   Soft tissues overlying the face and skull base do not demonstrate any acute abnormality.   There are numerous missing teeth, with dental cavities and periapical lucencies present in the remaining lower incisors. Temporomandibular joints are intact. No trauma to the oral cavity is identified.   Buccal soft tissues do not demonstrate any acute abnormalities. No fluid collections or soft tissue gas is identified. Parotid and submandibular glands are unremarkable in appearance.   CT C-SPINE:   There is straightening of normal lordotic curvature of the cervical spine, with a 1-2 mm anterolisthesis of C4 on C5.   No compression fractures are identified in the cervical spine, although mild insufficiency endplate changes with associated Schmorl's nodes are present along the inferior endplate of C6 and superior endplate of C7.   Posterior elements of the cervical spine do not demonstrate any evidence of acute trauma. No abnormal intra spinous distance widening or displaced transverse or spinous process fractures are identified.   Craniocervical junction is intact. Facet joints are preserved with mild degenerate facet osteoarthropathy present at several levels, most pronounced at C3-C4 and C4-C5 on the left.   Mild intervertebral disc height loss is present at C4-C5 and C6-C7.   No high-grade spinal canal stenosis is identified. Mild spinal canal narrowing is possible at the level of C3-C4 due to mild disc bulge and ligamentum flavum thickening with slight effacement of the cervical spinal cord.   Mild neural foraminal narrowing is present at C4-C5 on the left due to endplate spurring and hypertrophic facet changes.   Prevertebral and paraspinal soft tissues do not demonstrate any acute abnormalities.       CT HEAD: 1. No evidence of hemorrhage, depressed skull fracture, or other acute intracranial trauma. 2. Disproportionate enlargement of the supratentorial ventricular system relative to basal cisterns and sulci,  nonspecific finding that may represent asymmetric central volume loss or normal pressure hydrocephalus.   CT FACIAL BONES: 1. Left periorbital and supraorbital soft tissue swelling, without evidence of displaced orbital or facial bone fracture. 2. Large dental cavities and periapical lucencies are present in the few remaining lower incisors. Correlate with dental exam.   CT C-SPINE: 1. No evidence of acute trauma to the cervical spine.   MACRO: None   Signed by: Porsche Read 11/18/2023 12:49 AM Dictation workstation:   [id number]     Assessment/Plan     Néstor Castro is a 73 y.o. male with past medical history of HF with Improved EF, Atrial fibrillation on Eliquis, non-obstructive coronary artery disease, aortic stenosis, sleep apnea admitted for DKA and Altered mental status.      #Acute Encephalopathy  - DKA resolved; AMS improving  - VBG done shows no evidence of CO2 retention with values of 7.43/29/110   - CTH at the time of presentation was not concerning for intracranial lesion and or CVA   - Continue to monitor for change, if no improvement in 24hrs, may re-scan brain   - CT head repeated on 11/20: no intracranial hemorrhage or mass-effect. Moderate ventriculomegaly raising question for communicating hydrocephalus     #Lung Nodules  - Outpatient Ct in 12 months     #Heart Failure with Improved EF  - Last echocardiogram from 12/2020 showed EF of 55  - Hold Lasix, lisinopril and Jardiance     #Atrial Fibrillation with Rapid Ventricular Response  - restarted PO Metoprolol tartrate 25 twice daily and Flecainide 100 twice daily overnight 11/22  - discontinued heparin gtt; restarted eliquis 5mg bid 11/22  - discontinued IV metoprolol 5mg q6h (11/20)  - TTE ordered: Poor image quality. Left ventricular systolic function probably normal. Moderate aortic valve stenosis.  -11/23: Stop home aspirin on discharge     #HLD  #Non Obstructive CAD  -Continue aspirin and atorvastatin     #Constipation  -Bisacodyl  suppository 10mg     #Acute Kidney Injury, resolved  -Pre renal in nature in nature   -Baseline Creatine< 1  -Cr stable and BUN down-trending     # proteinaceous or hemorrhagic cyst  - noted on CT, Renal US exam is limited without cysts observed  - Urology consulted--> no intervention needed at this time; outpatient follow up     #Rhabdomyolysis  -likely due to fall  - CK 1200  - On D5 nacl 0.45 100 ml/hr--> discontinued     #Hypernatremia  - current sodium of 149 at 5 am (down from 154 at 4pm) decrease of 5mEq over 12 hours  - 1L LR bolus given at 0415; another 1L LR bolus given at 1100  - ordered additional 1L LR bolus for Na 148 this AM (11/22)  - initiate D5W @ 50cc/hr for continued hypernatremia  - recheck Na in the morning, 143 as of 11/23    #Hypophosphatemia  -3.5 11/23      #Diabetic Ketoacidosis  #Uncontrolled Type 2 DM  -Patient last A1C was 11.7 on 03/06  -Home regimen: Lantus 50 unit twice daily, humalog 10 unit before meal, and empagliflozin 10 daily  -Gap closed, BG sitting around 250-260, awake but still NPO  - Discontinue DKA protocol and orders  - insulin changed to: 50 units lantus Sq bid, lispro 10 units tid with meals, and a high SSI (#4)  - Required 4, 12, 12 for SSI on 11/21; will continue current insulin regimen  - Hold Empagliflozin    #Cellulitis   #Leukocytosis   -Likely reactive in the setting of DKA   - Patient noted to have a warm and edematous left leg  - likely in the setting of cellulitis   - deescalated abx from vanc and zosyn to IV cefazolin 1g q8h   - Follow up Bcx: preliminary is normal  - urine culture revealed enteric bacilli - still pending gram stain and sensitivities; 11/23 check RFP in morning and determine if UTI symptoms exist, consider treating with antibiotics     #Oral Thrush  -Nystatin swish 11/23    #Cough  -Mucinex 11/23       #Fall  #Multiple Bruises  #global weakness  - History of multiple falls  - PT/OT: missed sessions today due to inability to follow commands  per PT and OT notes; not medically appropriate for rehab at this time due to continued AMS and inability to consistently follow commands (11/22)  - Monitor bruises for now  - gen surgery consulted for possible debridement of wound on upper abdomen 6 x 23 cm--> intervention not recommended at this time  - wound care on board     Access: piv  Diet: Advanced to adult diet carb controlled: 60g carb/meal, 30g carb evening snack; pureed 4; Mild thick 2  GI Prophylaxis: none  DVT: SCD and Eliquis  Antibiotics: IV cefazolin 1g q8h  Code: Full code    Violeta Brewster MD  PGY-1

## 2023-11-23 NOTE — PROGRESS NOTES
Physical Therapy                 Therapy Communication Note    Patient Name: Néstor Castro  MRN: [id number]  Today's Date: 11/23/2023     Discipline: Physical Therapy    Missed Visit Reason: Missed Visit Reason: Other (Comment) (Pt condition remains inappropriate for safe participation in PT assessent. Pt ableto verbalized, though unable to demonstrate purposeful movement necessary for task completion. No PT eval at 0946)    Missed Time: Attempt

## 2023-11-23 NOTE — SIGNIFICANT EVENT
Pt transferred from ICU to floors. Pt currently HDS, denies any abdominal pain, chest pain, difficulty breathing. Interview limited as pt drowsy and went to sleep during exam, awakened to voice. Unable to assess current mental status, as he only answered yes or no questions.    Physical Exam  Appearance: drowsy elderly male, resting comfortably in bed, drowsy on examination  Cardiovascular:      Rate and Rhythm: Normal rate and regular rhythm.      Pulses: Normal pulses.      Heart sounds: Normal heart sounds.   Pulmonary:      Effort: Pulmonary effort is normal. On RA     Breath sounds: Normal breath sounds.   Abdominal:      General: Abdomen is flat. Bowel sounds are normal. No tenderness with palpation     Palpations: Abdomen is soft.   Musculoskeletal:      Comments: Exam limited by effort as pt sleeping. 1+ pitting edema bilaterally  Skin:     General: Skin is warm and dry. Numerous hematomas present on body (L cheek, bilateral knees)  Neurological:      Mental Status: Unable to assess orientation to person, place, time as pt sleeping       Updated plan 11/22: transfer orders reviewed, continued ICU active medications and restarted aspirin since pt HDS

## 2023-11-23 NOTE — PROGRESS NOTES
Occupational Therapy                 Therapy Communication Note    Patient Name: Néstor Castro  MRN: [id number]  Today's Date: 11/23/2023     Discipline: Occupational Therapy    Missed Visit Reason: Missed Visit Reason: Other (Comment) Pt awake but lethargic. AO to self. Slow to respond to simple questions asked of him. Unable to actively participate with therapy eval at this time. Nsg aware.    Missed Time: Attempt 0946     Travel dates: 2/10/18-2/24/18  Countries to be visited: [place]  Reason for travel:  vacation    Rural travel: yes  High altitude travel: no    Accommodations:  Hotel:   Hostel: yes  Camping:  Cruise:  With family:  Other:    Vaccines in past 30 days? No  Sick today? No  Allergies: hay fever    The screening intake form was reviewed with the traveler. Health risks associated with their travel plans have been reviewed and discussed with the traveler. The traveler has been provided with vaccine information statements for the vaccines that are recommended and given the opportunity to discuss risks and benefits of vaccination and or medications. The traveler has received education on the travel itinerary provided and on the following topics.    Personal safety precautions: Yes  Food and water precautions: Yes  Management of traveler's diarrhea: Yes  Mosquito/insect bite prevention:Yes  Animal bites/Rabies prevention: No  High altitude precautions: No      RN comments:     Typhoid and Hep A vaccine administered, vis given.          Physician consultation required: no

## 2023-11-23 NOTE — NURSING NOTE
1830: assumed pt care, pt arrived to 1 south from ICU, asking residents if orders need to be updated

## 2023-11-24 NOTE — PROGRESS NOTES
Occupational Therapy                 Therapy Communication Note    Patient Name: Néstor Castro  MRN: [id number]  Today's Date: 11/24/2023     Discipline: Occupational Therapy    Missed Visit Reason: Missed Visit Reason: Patient placed on medical hold (Condition remains inappropriate for safe participation in OT assessment. Pt remains lethargic, now with increased O2 demands and pending transfer to stepdown. No eval at 1336; discussed with RN and dtr at bedside)    Missed Time: Attempt

## 2023-11-24 NOTE — PROGRESS NOTES
Néstor Castro is a 73 y.o. male on day 6 of admission presenting with DKA, type 2, not at goal (CMS/HCC).      Subjective   There were no acute overnight events.     This morning, the patient was A&O x 1. He was slow to respond to questions and was lethargic. He had a notable cough on exam. However, he was more responsive than the day before and the cough was also better. He reported improvement in his symptoms, including improved cough. He denied any chest pain. He did have wheezing on exam in the left middle to upper lobes of the lungs.     In the afternoon, he had an oxygen saturation dropping into the 70s and a fever of 38.2. He was placed on a NRB mask and suctioned, after which he was back up to 98%. A chest xray was obtained, as was a venous blood gas. Chest physiotherapy was ordered due to suspected mucus plugging. An MRI brain was also obtained due to droopy face and motor changes reported by the patient's daughter to rule out stroke vs. CO2 retention. PRN Tylenol was recommended to be given later today once the patient is able to tolerate PO intake. Palliative Care was also consulted.     Objective     Last Recorded Vitals  /83 (BP Location: Left arm, Patient Position: Lying)   Pulse 106   Temp 36.1 °C (97 °F) (Temporal)   Resp 18   Wt 131 kg (289 lb 11 oz)   SpO2 100%   Intake/Output last 3 Shifts:    Intake/Output Summary (Last 24 hours) at 11/24/2023 1236  Last data filed at 11/24/2023 0457  Gross per 24 hour   Intake 510 ml   Output 1400 ml   Net -890 ml       Admission Weight  Weight: 121 kg (266 lb 12.1 oz) (11/18/23 0553)    Daily Weight  11/23/23 : 131 kg (289 lb 11 oz)    Image Results  US right upper quadrant  Narrative: Interpreted By:  Compa Perez,   STUDY:  US RIGHT UPPER QUADRANT; 11/24/2023 11:56 am      INDICATION:  Signs/Symptoms:elevated LFTs.      COMPARISON:  None.      ACCESSION NUMBER(S):  [id number]      ORDERING CLINICIAN:  THOR STRINGER      TECHNIQUE:  Multiple  grayscale ultrasonographic images were obtained through the  right upper quadrant.      FINDINGS:  LIVER:  The liver is grossly unremarkable in appearance, without evidence of  focal masses.      GALLBLADDER:  The gallbladder is nondilated without evidence of gallstones, wall  thickening or pericholecystic fluid.  No ultrasonographic Chacon's  sign was elicited.      BILIARY TREE:  There is no significant intra or extrahepatic biliary dilatation  present, with the common bile duct measuring at up to 5 mm      PANCREAS:  The visualized portions of the pancreas are within normal limits,  without evidence of focal masses.      RIGHT KIDNEY:  Screening evaluation of the right kidney demonstrates no evidence of  hydronephrosis. Cysts are seen in the right kidney.      Impression: 1. No ultrasonographic evidence of acute cholecystitis or biliary  dilatation.  2. Diffuse hyper echogenicity of the liver, most consistent with  fatty infiltration.      MACRO:  None      Signed by: Compa Perez 11/24/2023 12:27 PM  Dictation workstation:   [id number]      Physical Exam  Cardiovascular:      Rate and Rhythm: Normal rate and regular rhythm.      Pulses: Normal pulses.      Heart sounds: Normal heart sounds.   Pulmonary:      Effort: Pulmonary effort is normal.      Breath sounds: Wheezing (left middle and upper lobe) present.   Abdominal:      General: Abdomen is flat. Bowel sounds are normal.      Palpations: Abdomen is soft.   Genitourinary:     Comments: External urinary catheter present  Musculoskeletal:      Comments: RUE and LUE: 3/5 below the elbow; hasn't been able to move arm at the shoulder b/l  RLE and LLE: 1/5   Skin:     General: Skin is warm and dry.   Neurological:      Mental Status: He is disoriented.      Comments: A&O x 1: oriented to person        Relevant Results  Scheduled medications  apixaban, 5 mg, oral, BID  atorvastatin, 40 mg, oral, Daily  bacitracin, , Topical, TID  ceFAZolin, 1 g, intravenous,  q8h  flecainide, 100 mg, oral, BID  guaiFENesin, 1,200 mg, oral, BID  insulin glargine, 40 Units, subcutaneous, BID  insulin lispro, 0-20 Units, subcutaneous, TID with meals  insulin lispro, 5 Units, subcutaneous, TID with meals  metoprolol tartrate, 25 mg, oral, BID  nystatin, 5 mL, Swish & Spit, 4x daily      Continuous medications       PRN medications  PRN medications: acetaminophen, dextrose, glucagon, ipratropium-albuteroL, oxygen    Results for orders placed or performed during the hospital encounter of 11/18/23 (from the past 24 hour(s))   POCT GLUCOSE   Result Value Ref Range    POCT Glucose 96 74 - 99 mg/dL   POCT GLUCOSE   Result Value Ref Range    POCT Glucose 122 (H) 74 - 99 mg/dL   POCT GLUCOSE   Result Value Ref Range    POCT Glucose 74 74 - 99 mg/dL   POCT GLUCOSE   Result Value Ref Range    POCT Glucose 61 (L) 74 - 99 mg/dL   POCT GLUCOSE   Result Value Ref Range    POCT Glucose 73 (L) 74 - 99 mg/dL   POCT GLUCOSE   Result Value Ref Range    POCT Glucose 98 74 - 99 mg/dL   POCT GLUCOSE   Result Value Ref Range    POCT Glucose 111 (H) 74 - 99 mg/dL   CBC   Result Value Ref Range    WBC 10.2 4.4 - 11.3 x10*3/uL    nRBC 0.2 (H) 0.0 - 0.0 /100 WBCs    RBC 4.60 4.50 - 5.90 x10*6/uL    Hemoglobin 11.0 (L) 13.5 - 17.5 g/dL    Hematocrit 37.0 (L) 41.0 - 52.0 %    MCV 80 80 - 100 fL    MCH 23.9 (L) 26.0 - 34.0 pg    MCHC 29.7 (L) 32.0 - 36.0 g/dL    RDW 15.5 (H) 11.5 - 14.5 %    Platelets 199 150 - 450 x10*3/uL   Comprehensive metabolic panel   Result Value Ref Range    Glucose 88 74 - 99 mg/dL    Sodium 139 136 - 145 mmol/L    Potassium 3.8 3.5 - 5.3 mmol/L    Chloride 103 98 - 107 mmol/L    Bicarbonate 29 21 - 32 mmol/L    Anion Gap 11 10 - 20 mmol/L    Urea Nitrogen 17 6 - 23 mg/dL    Creatinine 1.05 0.50 - 1.30 mg/dL    eGFR 75 >60 mL/min/1.73m*2    Calcium 8.5 (L) 8.6 - 10.3 mg/dL    Albumin 2.9 (L) 3.4 - 5.0 g/dL    Alkaline Phosphatase 129 33 - 136 U/L    Total Protein 6.4 6.4 - 8.2 g/dL      (H) 9 - 39 U/L    Bilirubin, Total 0.5 0.0 - 1.2 mg/dL    ALT 57 (H) 10 - 52 U/L   Magnesium   Result Value Ref Range    Magnesium 1.95 1.60 - 2.40 mg/dL   Phosphorus   Result Value Ref Range    Phosphorus 3.3 2.5 - 4.9 mg/dL   POCT GLUCOSE   Result Value Ref Range    POCT Glucose 89 74 - 99 mg/dL   POCT GLUCOSE   Result Value Ref Range    POCT Glucose 116 (H) 74 - 99 mg/dL     Transthoracic Echo (TTE) Complete    Result Date: 11/20/2023   [facility], [address] [place] [address]               Tel [phone] and Fax [phone] TRANSTHORACIC ECHOCARDIOGRAM REPORT  Patient Name:     JYOTI Nash Physician:   [id number] Debora Jernigan MD Study Date:       11/20/2023          Ordering Provider:   [id number] SIMBA FRAGA MRN/PID:          [id number]            Fellow: Accession#:       [id number]        Nurse:               Anegla Cooper RN Date of           1950 / 73      Sonographer:         Keyonna Klein RDCS Birth/Age:        years Gender:           M                   Additional Staff: Height:           182.88 cm           Admit Date:          11/18/2023 Weight:           124.74 kg           Admission Status:    Inpatient - STAT BSA:              2.44 m2             Encounter#:          [id number]                                       Department Location: [facility] ICU Blood Pressure: 127 /61 mmHg Study Type:    TRANSTHORACIC ECHO (TTE) COMPLETE Diagnosis/ICD: Unspecified atrial fibrillation-I48.91 Indication:    Afib CPT Code:      Echo Complete w Full [id number] Patient History: Pertinent History: A-Fib, CAD and As, Elev. trop. Study Detail: The following Echo studies were performed: 2D, M-Mode, Doppler and               color flow. Technically challenging study due to patient lying in               supine position and body habitus. Definity used as a contrast               agent for endocardial border definition.  Total contrast used for               this procedure was 1.5 mL via IV push. Unable to obtain LA, RA               janiya. and subcostal view. The patient was asleep.  PHYSICIAN INTERPRETATION: Left Ventricle: The left ventricular systolic function probably normal. However accurate estimated can not be done due to poor image quality. The left ventricular cavity size is normal. There is mildly increased left ventricular posterior wall thickness. Left ventricular diastolic filling was indeterminate. Left Atrium: The left atrium was not well visualized. Right Ventricle: The right ventricle is normal in size. There is normal right ventricular global systolic function. Right Atrium: The right atrium was not well visualized. Aortic Valve: The aortic valve was not well visualized. There is moderate aortic valve cusp calcification. There is evidence of moderate aortic valve stenosis. There is no evidence of aortic valve regurgitation. The peak instantaneous gradient of the aortic valve is 34.3 mmHg. The mean gradient of the aortic valve is 19.0 mmHg. Mitral Valve: The mitral valve is mild to moderately thickened. There is mild to moderate mitral annular calcification. There is trace mitral valve regurgitation. Tricuspid Valve: The tricuspid valve was not well visualized. There is trace to mild tricuspid regurgitation. The right ventricular systolic pressure is unable to be estimated. Pulmonic Valve: The pulmonic valve is not well visualized. There is trace pulmonic valve regurgitation. Pericardium: There is no pericardial effusion noted. Aorta: The aortic root is normal. In comparison to the previous echocardiogram(s): There are no prior studies on this patient for comparison purposes.  CONCLUSIONS:  1. Left ventricular systolic function probably normal. However accurate estimated can not be done due to poor image quality.  2. Poorly visualized anatomical structures due to suboptimal image quality.  3. Moderate aortic valve  stenosis. QUANTITATIVE DATA SUMMARY: 2D MEASUREMENTS:                          Normal Ranges: IVSd:          1.11 cm   (0.6-1.1cm) LVPWd:         1.03 cm   (0.6-1.1cm) LVIDd:         5.07 cm   (3.9-5.9cm) LVIDs:         3.98 cm LV Mass Index: 83.6 g/m2 LV % FS        21.5 % AORTA MEASUREMENTS:                    Normal Ranges: Asc Ao, d: 3.00 cm (2.1-3.4cm) LV DIASTOLIC FUNCTION:                     Normal Ranges: MV Peak E: 0.89 m/s (0.7-1.2 m/s) MV Peak A: 0.88 m/s (0.42-0.7 m/s) E/A Ratio: 1.00     (1.0-2.2) MITRAL VALVE:                 Normal Ranges: MV DT: 180 msec (150-240msec) AORTIC VALVE:                                    Normal Ranges: AoV Vmax:                2.93 m/s  (<=1.7m/s) AoV Peak P.3 mmHg (<20mmHg) AoV Mean P.0 mmHg (1.7-11.5mmHg) LVOT Max Eddie:            0.75 m/s  (<=1.1m/s) AoV VTI:                 49.70 cm  (18-25cm) LVOT VTI:                15.00 cm LVOT Diameter:           2.30 cm   (1.8-2.4cm) AoV Area, VTI:           1.25 cm2  (2.5-5.5cm2) AoV Area,Vmax:           1.07 cm2  (2.5-4.5cm2) AoV Dimensionless Index: 0.30  RIGHT VENTRICLE: RV Basal 3.48 cm RV Mid   3.95 cm RV Major 8.6 cm TAPSE:   10.7 mm RV s'    0.11 m/s TRICUSPID VALVE/RVSP:                             Normal Ranges: Peak TR Velocity: 2.23 m/s RV Syst Pressure: 22.9 mmHg (< 30mmHg) PULMONIC VALVE:                      Normal Ranges: PV Max Eddie: 0.7 m/s  (0.6-0.9m/s) PV Max P.7 mmHg  [id number] Debora Jernigan MD Electronically signed on 2023 at 12:17:13 PM  ** Final **     CT head wo IV contrast    Result Date: 2023  Interpreted By:  Miya Sutton, STUDY: CT HEAD WO IV CONTRAST;  2023 10:13 am   INDICATION: Signs/Symptoms:continued altered mentation.   COMPARISON: 12:04 a.m. the same day   ACCESSION NUMBER(S): [id number]   ORDERING CLINICIAN: BECCA RAMOS   TECHNIQUE: Unenhanced CT images of the head were obtained.   FINDINGS: Diffuse atrophy with enlargement of the  extra-axial spaces and cerebral sulci similar to the prior exam. Moderate ventriculomegaly likely out of proportion to the degree of cerebral atrophy also similar to the prior study. Subtle periventricular white matter hypodensities consistent with small vessel ischemic disease again seen. No acute intracranial hemorrhage or mass-effect. No midline shift. No extra-axial fluid collection.   No focal calvarial lesion.   Visualized paranasal sinuses are clear.       No acute intracranial hemorrhage or mass-effect. Moderate ventriculomegaly likely out of proportion of the degree of cerebral atrophy and raising question of communicating hydrocephalus again noted, similar to 2 days prior.   MACRO: None.   Signed by: Miya Sutton 11/20/2023 11:30 AM Dictation workstation:   [id number]    XR chest 1 view    Result Date: 11/19/2023  Interpreted By:  Josias Marie, STUDY: XR CHEST 1 VIEW;  11/19/2023 9:05 pm   INDICATION: Signs/Symptoms:Fever, increased rhonci.   COMPARISON: 11/18/2023   ACCESSION NUMBER(S): [id number]   ORDERING CLINICIAN: KHADIJAH DOMINGUEZ   FINDINGS: There is redemonstration of left ventriculomegaly. The aorta is atherosclerotic. Low lung volumes results in basilar and perihilar bronchovascular crowding. There are nonspecific opacities at the medial lung bases that are at least in part atelectatic at the right lung base due to low lung volumes. There is also patchy airspace disease in the medial left lower lobe that appears ostensibly new from 11/18/2023 CT chest abdomen pelvis.         Probable new airspace disease in the medial left lower lobe compared to prior study that may relate to atelectasis or aspiration pneumonitis. Similarly, there is new perihilar airspace disease that is likely in part atelectatic, but could also be caused by aspiration and postobstructive volume loss. CT chest recommended to further evaluate.   MACRO: None.   Signed by: Josias Marie 11/19/2023 9:32 PM Dictation  workstation:   [id number]    US renal complete    Result Date: 11/18/2023  Interpreted By:  Daria Mckinney, STUDY: US RENAL COMPLETE; 11/18/2023 1:55 pm   INDICATION: Concern for hemorrhagic renal cyst.   COMPARISON: CT chest, abdomen and pelvis 11/17/2023   ACCESSION NUMBER(S): [id number]   ORDERING CLINICIAN: CRYSTAL JULIAN   TECHNIQUE: Sonography of the kidneys and urinary bladder was performed.   FINDINGS: Limited examination due to portable technique and patient's immobility.   Right Kidney: *Renal length: 10.2 cm *Parenchyma: Normal parenchymal echogenicity. Normal parenchymal thickness. *Collecting system: No hydronephrosis. *Calculus: No echogenic, shadowing calculus. *Lesion: Previously shown renal cysts on the prior CT not visualized.     Left Kidney: *Renal length: 10.6 cm *Parenchyma: Normal parenchymal echogenicity. Normal parenchymal thickness. *Collecting system: No hydronephrosis. *Calculus: No echogenic, shadowing calculus. *Lesion: Previously shown renal cyst on the prior CT not visualized.   Bladder: Not visualized.       Limited examination due to portable technique and patient's immobility. Previously shown renal cysts on the prior CT not visualized.   No hydronephrosis.   MACRO: None   Signed by: Daria Mckinney 11/18/2023 2:18 PM Dictation workstation:   [id number]    CT thoracic spine wo IV contrast    Result Date: 11/18/2023  Interpreted By:  Porsche Read, STUDY: CT THORACIC SPINE WO IV CONTRAST; CT LUMBAR SPINE WO IV CONTRAST; 11/18/2023 12:27 am   INDICATION: Signs/Symptoms:fall.   COMPARISON: Same day CT of the chest, abdomen and pelvis.   ACCESSION NUMBER(S): [id number]; [id number]   ORDERING CLINICIAN: ROWAN AVILA   TECHNIQUE: Axial CT images of the thoracic and lumbar spine are obtained. Axial, coronal and sagittal reconstructions are submitted for review.   FINDINGS: THORACIC SPINE:   Thoracic vertebral alignment is maintained, without significant spondylolisthesis.    Multilevel insufficiency endplate changes with small Schmorl's nodes are present in the thoracic spine along the inferior endplates of T6, T7, T8 and T9, without evidence of compression fractures.   Posterior elements of the thoracic spine do not demonstrate any evidence of acute trauma. Well corticated ossific fragments along the spinous processes are T3, T4 and T5 likely represent degenerative changes or sequela of remote injuries.   Mild intervertebral disc height loss is present in the mid and lower thoracic spine.   Although the exam is not optimized to assess the spinal canal, no high-grade spinal canal stenosis is identified.   Paraspinal musculature is unremarkable in appearance.   LUMBAR SPINE:   There are 5 lumbar type non rib-bearing vertebral bodies, with lowest well-formed intervertebral disc space labeled L5-S1.   No compression fracture is identified in the lumbar spine, although multilevel insufficiency endplate changes are present, with Schmorl's nodes noted along the inferior endplates of L4 and L5 and superior endplates of L5 and S1.   Posterior elements of the lumbar spine do not demonstrate any evidence of acute trauma. No abnormal intra spinous distance widening or displaced transverse or spinous process fractures are identified.   Moderate intervertebral disc height loss is present at L5-S1.   Although the exam is not optimized to assess the spinal canal, no high-grade stenosis is identified, likely mild spinal canal narrowing is present at L3-L4 and L4-L5 due to disc osteophyte complexes and ligamentum flavum thickening.   Paraspinal musculature does not demonstrate any acute abnormalities.       1.  No evidence of acute trauma or high-grade stenosis in the thoracic or lumbar spine. 2. Likely mild spinal canal narrowing is present at the levels of L3-L4 and L4-L5 due to disc osteophyte complexes.   MACRO: None   Signed by: Porsche Read 11/18/2023 1:14 AM Dictation workstation:    [id number]    CT lumbar spine wo IV contrast    Result Date: 11/18/2023  Interpreted By:  Porsche Read, STUDY: CT THORACIC SPINE WO IV CONTRAST; CT LUMBAR SPINE WO IV CONTRAST; 11/18/2023 12:27 am   INDICATION: Signs/Symptoms:fall.   COMPARISON: Same day CT of the chest, abdomen and pelvis.   ACCESSION NUMBER(S): [id number]; [id number]   ORDERING CLINICIAN: ROWAN AVILA   TECHNIQUE: Axial CT images of the thoracic and lumbar spine are obtained. Axial, coronal and sagittal reconstructions are submitted for review.   FINDINGS: THORACIC SPINE:   Thoracic vertebral alignment is maintained, without significant spondylolisthesis.   Multilevel insufficiency endplate changes with small Schmorl's nodes are present in the thoracic spine along the inferior endplates of T6, T7, T8 and T9, without evidence of compression fractures.   Posterior elements of the thoracic spine do not demonstrate any evidence of acute trauma. Well corticated ossific fragments along the spinous processes are T3, T4 and T5 likely represent degenerative changes or sequela of remote injuries.   Mild intervertebral disc height loss is present in the mid and lower thoracic spine.   Although the exam is not optimized to assess the spinal canal, no high-grade spinal canal stenosis is identified.   Paraspinal musculature is unremarkable in appearance.   LUMBAR SPINE:   There are 5 lumbar type non rib-bearing vertebral bodies, with lowest well-formed intervertebral disc space labeled L5-S1.   No compression fracture is identified in the lumbar spine, although multilevel insufficiency endplate changes are present, with Schmorl's nodes noted along the inferior endplates of L4 and L5 and superior endplates of L5 and S1.   Posterior elements of the lumbar spine do not demonstrate any evidence of acute trauma. No abnormal intra spinous distance widening or displaced transverse or spinous process fractures are identified.   Moderate intervertebral  disc height loss is present at L5-S1.   Although the exam is not optimized to assess the spinal canal, no high-grade stenosis is identified, likely mild spinal canal narrowing is present at L3-L4 and L4-L5 due to disc osteophyte complexes and ligamentum flavum thickening.   Paraspinal musculature does not demonstrate any acute abnormalities.       1.  No evidence of acute trauma or high-grade stenosis in the thoracic or lumbar spine. 2. Likely mild spinal canal narrowing is present at the levels of L3-L4 and L4-L5 due to disc osteophyte complexes.   MACRO: None   Signed by: Porsche Read 11/18/2023 1:14 AM Dictation workstation:   [id number]    CT chest abdomen pelvis wo IV contrast    Result Date: 11/18/2023  Interpreted By:  Porsche Read, STUDY: CT CHEST ABDOMEN PELVIS WO CONTRAST;  11/18/2023 12:27 am   INDICATION: Signs/Symptoms:pain.   COMPARISON: None.   ACCESSION NUMBER(S): [id number]   ORDERING CLINICIAN: ROWAN AVILA   TECHNIQUE: CT of the chest, abdomen and pelvis was performed. Contiguous axial images were obtained at 3 mm slice thickness through the chest, abdomen and pelvis. Coronal and sagittal reconstructions at 3 mm slice thickness were performed.  No intravenous or oral contrast agents were administered.   FINDINGS: Please note that the study is limited without intravenous contrast.   CHEST:     LUNG/PLEURA/LARGE AIRWAYS: Trachea and central airways are patent, without evidence of endobronchial lesion. There is a diverticulum arising from right mainstem bronchus projecting into the subcarinal region.   Within limitations of somewhat motion degraded exam, no pleural effusions, consolidation or pneumothorax is evident.   Several tiny 2-3 mm nodules are present in the lungs bilaterally. No suspicious lung masses are identified.   VESSELS: Aorta and pulmonary arteries are normal caliber.  Moderate vascular calcifications are present in the abdominal aorta. Moderate coronary artery  calcifications are present.   HEART: Heart is normal in size with pericardial calcifications present. No pericardial effusion is noted.   MEDIASTINUM AND DUDLEY: No mediastinal, hilar or axillary lymph nodes are present.  The esophagus is unremarkable in appearance. There is no evidence of pneumomediastinum.   CHEST WALL AND LOWER NECK: Soft tissues of the chest wall do not demonstrate any acute abnormalities. No displaced rib fractures or other osseous injuries are identified.   ABDOMEN:   LIVER: Liver is normal in size and within limits of noncontrast exam does not demonstrate any acute abnormalities. No perihepatic free fluid is evident.   BILE DUCTS: No intrahepatic or extrahepatic biliary dilatation is present.   GALLBLADDER: Gallbladder is unremarkable in appearance.   PANCREAS: No pancreatic ductal dilatation or peripancreatic stranding is evident.   SPLEEN: Spleen is unremarkable in appearance.   ADRENAL GLANDS: Adrenal glands are unremarkable.   KIDNEYS AND URETERS: Kidneys are symmetric in size without evidence of hydronephrosis bilaterally. Several lobulated low-density lesions arise from the right kidney, possibly representing cysts. No ureteral dilatation is evident.   PELVIS:   BLADDER: Bladder is unremarkable in appearance.   REPRODUCTIVE ORGANS: Prostate is enlarged.   BOWEL: Small bowel is nondilated. No inflammatory wall thickening or abnormal dilatation is present in the large bowel. Appendix is unremarkable in appearance in the right lower quadrant.   Scattered diverticula are present in the sigmoid colon without evidence of acute diverticulitis.   VESSELS: There is no aneurysmal dilatation of the abdominal aorta. The IVC is within normal limits. Extensive vascular calcifications are present in the abdominal aorta.   PERITONEUM/RETROPERITONEUM/LYMPH NODES: There is no evidence of free air, free fluid, or thick-walled collections in the abdomen and pelvis. No enlarged lymphadenopathy is evident.    ABDOMINAL WALL: The abdominal wall soft tissues appear normal.   BONES: Please refer to separately dictated CT of the thoracic and lumbar spine for further characterization of spinal findings.   No acute osseous abnormality is identified in the pelvis.       1. No evidence of acute abnormality is present in the chest, abdomen or pelvis. No evidence of acute trauma. 2. Moderate coronary artery calcifications, with pericardial calcifications. Correlate with restrictive pericarditis. 3. Several 2-3 mm solid nodules are present in the upper lungs bilaterally, likely benign by size criteria if there is no history of malignancy, although if patient is has risk factors for lung cancer, yearly screening CT is warranted. 4. Scattered diverticula are present in the sigmoid colon without evidence of acute diverticulitis. 5. Low-density lobulated lesions in the lower pole of the right kidney are incompletely characterized on current exam, although cyst cystically likely represent cysts. Slightly more hyperdense exophytic lesion arising from the lower pole of the left kidney may represent a proteinaceous or hemorrhagic cyst but is incompletely characterized on study. 6. Additional findings as detailed above.     MACRO: None   Signed by: Porsche Read 11/18/2023 1:05 AM Dictation workstation:   [id number]    CT head wo IV contrast    Result Date: 11/18/2023  Interpreted By:  Porsche Read, STUDY: CT HEAD WO IV CONTRAST; CT CERVICAL SPINE WO IV CONTRAST; CT FACIAL BONES WO IV CONTRAST; CT 3D RECONSTRUCTION;  11/18/2023 12:28 am   INDICATION: Signs/Symptoms:altered mental state; Signs/Symptoms:pain; Signs/Symptoms:contusion; Signs/Symptoms:possible fall contusion.   COMPARISON: None.   ACCESSION NUMBER(S): [id number]; [id number]; [id number]; [id number]   ORDERING CLINICIAN: ROWAN AVILA   TECHNIQUE: Noncontrast axial CT scan of head was performed, with coronal and sagittal reformats provided. The images  were reviewed in bone, brain, blood and soft tissue windows.   Thin cut axial CT images through the facial bones were obtained and reconstructed in the coronal and sagittal plane. 3D reconstruction was created on a dedicated workstation and provided for interpretation.   Axial CT images of the cervical spine are obtained. Axial, coronal and sagittal reconstructions are provided for review.   FINDINGS: CT HEAD:   No hyperdense intracranial hemorrhage is evident. There is no mass effect or midline shift.   Gray-white differentiation is intact, without evidence of CT apparent transcortical infarct.   There is somewhat disproportionate enlargement of the supratentorial ventricular system relative to basal cisterns nonspecific finding that may represent asymmetric central volume loss or normal pressure hydrocephalus. No abnormal extra-axial fluid collections are identified. Basal cisterns are patent.   Scalp soft tissues do not demonstrate any acute abnormalities. Calvarium is unremarkable in appearance without evidence of depressed skull fracture. Mastoid air cells and middle ear cavities are well aerated without evidence of fluid fluid levels.   CT FACIAL BONES:   Mild left supraorbital and periorbital soft tissue swelling and edema is present, without evidence of associated fracture. Bony orbits are intact.   Intraorbital structures are symmetric in appearance without evidence of acute trauma.   Facial bones appear intact without evidence of displaced fracture. Nasal bones are unremarkable in appearance.   Visualized paranasal sinuses are well aerated without evidence of fluid fluid levels.   Soft tissues overlying the face and skull base do not demonstrate any acute abnormality.   There are numerous missing teeth, with dental cavities and periapical lucencies present in the remaining lower incisors. Temporomandibular joints are intact. No trauma to the oral cavity is identified.   Buccal soft tissues do not  demonstrate any acute abnormalities. No fluid collections or soft tissue gas is identified. Parotid and submandibular glands are unremarkable in appearance.   CT C-SPINE:   There is straightening of normal lordotic curvature of the cervical spine, with a 1-2 mm anterolisthesis of C4 on C5.   No compression fractures are identified in the cervical spine, although mild insufficiency endplate changes with associated Schmorl's nodes are present along the inferior endplate of C6 and superior endplate of C7.   Posterior elements of the cervical spine do not demonstrate any evidence of acute trauma. No abnormal intra spinous distance widening or displaced transverse or spinous process fractures are identified.   Craniocervical junction is intact. Facet joints are preserved with mild degenerate facet osteoarthropathy present at several levels, most pronounced at C3-C4 and C4-C5 on the left.   Mild intervertebral disc height loss is present at C4-C5 and C6-C7.   No high-grade spinal canal stenosis is identified. Mild spinal canal narrowing is possible at the level of C3-C4 due to mild disc bulge and ligamentum flavum thickening with slight effacement of the cervical spinal cord.   Mild neural foraminal narrowing is present at C4-C5 on the left due to endplate spurring and hypertrophic facet changes.   Prevertebral and paraspinal soft tissues do not demonstrate any acute abnormalities.       CT HEAD: 1. No evidence of hemorrhage, depressed skull fracture, or other acute intracranial trauma. 2. Disproportionate enlargement of the supratentorial ventricular system relative to basal cisterns and sulci, nonspecific finding that may represent asymmetric central volume loss or normal pressure hydrocephalus.   CT FACIAL BONES: 1. Left periorbital and supraorbital soft tissue swelling, without evidence of displaced orbital or facial bone fracture. 2. Large dental cavities and periapical lucencies are present in the few remaining  lower incisors. Correlate with dental exam.   CT C-SPINE: 1. No evidence of acute trauma to the cervical spine.   MACRO: None   Signed by: Porsche Read 11/18/2023 12:49 AM Dictation workstation:   [id number]    CT cervical spine wo IV contrast    Result Date: 11/18/2023  Interpreted By:  Porsche Read, STUDY: CT HEAD WO IV CONTRAST; CT CERVICAL SPINE WO IV CONTRAST; CT FACIAL BONES WO IV CONTRAST; CT 3D RECONSTRUCTION;  11/18/2023 12:28 am   INDICATION: Signs/Symptoms:altered mental state; Signs/Symptoms:pain; Signs/Symptoms:contusion; Signs/Symptoms:possible fall contusion.   COMPARISON: None.   ACCESSION NUMBER(S): [id number]; [id number]; [id number]; [id number]   ORDERING CLINICIAN: ROWAN AVILA   TECHNIQUE: Noncontrast axial CT scan of head was performed, with coronal and sagittal reformats provided. The images were reviewed in bone, brain, blood and soft tissue windows.   Thin cut axial CT images through the facial bones were obtained and reconstructed in the coronal and sagittal plane. 3D reconstruction was created on a dedicated workstation and provided for interpretation.   Axial CT images of the cervical spine are obtained. Axial, coronal and sagittal reconstructions are provided for review.   FINDINGS: CT HEAD:   No hyperdense intracranial hemorrhage is evident. There is no mass effect or midline shift.   Gray-white differentiation is intact, without evidence of CT apparent transcortical infarct.   There is somewhat disproportionate enlargement of the supratentorial ventricular system relative to basal cisterns nonspecific finding that may represent asymmetric central volume loss or normal pressure hydrocephalus. No abnormal extra-axial fluid collections are identified. Basal cisterns are patent.   Scalp soft tissues do not demonstrate any acute abnormalities. Calvarium is unremarkable in appearance without evidence of depressed skull fracture. Mastoid air cells and middle ear  cavities are well aerated without evidence of fluid fluid levels.   CT FACIAL BONES:   Mild left supraorbital and periorbital soft tissue swelling and edema is present, without evidence of associated fracture. Bony orbits are intact.   Intraorbital structures are symmetric in appearance without evidence of acute trauma.   Facial bones appear intact without evidence of displaced fracture. Nasal bones are unremarkable in appearance.   Visualized paranasal sinuses are well aerated without evidence of fluid fluid levels.   Soft tissues overlying the face and skull base do not demonstrate any acute abnormality.   There are numerous missing teeth, with dental cavities and periapical lucencies present in the remaining lower incisors. Temporomandibular joints are intact. No trauma to the oral cavity is identified.   Buccal soft tissues do not demonstrate any acute abnormalities. No fluid collections or soft tissue gas is identified. Parotid and submandibular glands are unremarkable in appearance.   CT C-SPINE:   There is straightening of normal lordotic curvature of the cervical spine, with a 1-2 mm anterolisthesis of C4 on C5.   No compression fractures are identified in the cervical spine, although mild insufficiency endplate changes with associated Schmorl's nodes are present along the inferior endplate of C6 and superior endplate of C7.   Posterior elements of the cervical spine do not demonstrate any evidence of acute trauma. No abnormal intra spinous distance widening or displaced transverse or spinous process fractures are identified.   Craniocervical junction is intact. Facet joints are preserved with mild degenerate facet osteoarthropathy present at several levels, most pronounced at C3-C4 and C4-C5 on the left.   Mild intervertebral disc height loss is present at C4-C5 and C6-C7.   No high-grade spinal canal stenosis is identified. Mild spinal canal narrowing is possible at the level of C3-C4 due to mild disc  bulge and ligamentum flavum thickening with slight effacement of the cervical spinal cord.   Mild neural foraminal narrowing is present at C4-C5 on the left due to endplate spurring and hypertrophic facet changes.   Prevertebral and paraspinal soft tissues do not demonstrate any acute abnormalities.       CT HEAD: 1. No evidence of hemorrhage, depressed skull fracture, or other acute intracranial trauma. 2. Disproportionate enlargement of the supratentorial ventricular system relative to basal cisterns and sulci, nonspecific finding that may represent asymmetric central volume loss or normal pressure hydrocephalus.   CT FACIAL BONES: 1. Left periorbital and supraorbital soft tissue swelling, without evidence of displaced orbital or facial bone fracture. 2. Large dental cavities and periapical lucencies are present in the few remaining lower incisors. Correlate with dental exam.   CT C-SPINE: 1. No evidence of acute trauma to the cervical spine.   MACRO: None   Signed by: Porsche Read 11/18/2023 12:49 AM Dictation workstation:   [id number]    CT maxillofacial bones wo IV contrast    Result Date: 11/18/2023  Interpreted By:  Porsche Read, STUDY: CT HEAD WO IV CONTRAST; CT CERVICAL SPINE WO IV CONTRAST; CT FACIAL BONES WO IV CONTRAST; CT 3D RECONSTRUCTION;  11/18/2023 12:28 am   INDICATION: Signs/Symptoms:altered mental state; Signs/Symptoms:pain; Signs/Symptoms:contusion; Signs/Symptoms:possible fall contusion.   COMPARISON: None.   ACCESSION NUMBER(S): [id number]; [id number]; [id number]; [id number]   ORDERING CLINICIAN: ROWAN AVILA   TECHNIQUE: Noncontrast axial CT scan of head was performed, with coronal and sagittal reformats provided. The images were reviewed in bone, brain, blood and soft tissue windows.   Thin cut axial CT images through the facial bones were obtained and reconstructed in the coronal and sagittal plane. 3D reconstruction was created on a dedicated workstation and  provided for interpretation.   Axial CT images of the cervical spine are obtained. Axial, coronal and sagittal reconstructions are provided for review.   FINDINGS: CT HEAD:   No hyperdense intracranial hemorrhage is evident. There is no mass effect or midline shift.   Gray-white differentiation is intact, without evidence of CT apparent transcortical infarct.   There is somewhat disproportionate enlargement of the supratentorial ventricular system relative to basal cisterns nonspecific finding that may represent asymmetric central volume loss or normal pressure hydrocephalus. No abnormal extra-axial fluid collections are identified. Basal cisterns are patent.   Scalp soft tissues do not demonstrate any acute abnormalities. Calvarium is unremarkable in appearance without evidence of depressed skull fracture. Mastoid air cells and middle ear cavities are well aerated without evidence of fluid fluid levels.   CT FACIAL BONES:   Mild left supraorbital and periorbital soft tissue swelling and edema is present, without evidence of associated fracture. Bony orbits are intact.   Intraorbital structures are symmetric in appearance without evidence of acute trauma.   Facial bones appear intact without evidence of displaced fracture. Nasal bones are unremarkable in appearance.   Visualized paranasal sinuses are well aerated without evidence of fluid fluid levels.   Soft tissues overlying the face and skull base do not demonstrate any acute abnormality.   There are numerous missing teeth, with dental cavities and periapical lucencies present in the remaining lower incisors. Temporomandibular joints are intact. No trauma to the oral cavity is identified.   Buccal soft tissues do not demonstrate any acute abnormalities. No fluid collections or soft tissue gas is identified. Parotid and submandibular glands are unremarkable in appearance.   CT C-SPINE:   There is straightening of normal lordotic curvature of the cervical spine,  with a 1-2 mm anterolisthesis of C4 on C5.   No compression fractures are identified in the cervical spine, although mild insufficiency endplate changes with associated Schmorl's nodes are present along the inferior endplate of C6 and superior endplate of C7.   Posterior elements of the cervical spine do not demonstrate any evidence of acute trauma. No abnormal intra spinous distance widening or displaced transverse or spinous process fractures are identified.   Craniocervical junction is intact. Facet joints are preserved with mild degenerate facet osteoarthropathy present at several levels, most pronounced at C3-C4 and C4-C5 on the left.   Mild intervertebral disc height loss is present at C4-C5 and C6-C7.   No high-grade spinal canal stenosis is identified. Mild spinal canal narrowing is possible at the level of C3-C4 due to mild disc bulge and ligamentum flavum thickening with slight effacement of the cervical spinal cord.   Mild neural foraminal narrowing is present at C4-C5 on the left due to endplate spurring and hypertrophic facet changes.   Prevertebral and paraspinal soft tissues do not demonstrate any acute abnormalities.       CT HEAD: 1. No evidence of hemorrhage, depressed skull fracture, or other acute intracranial trauma. 2. Disproportionate enlargement of the supratentorial ventricular system relative to basal cisterns and sulci, nonspecific finding that may represent asymmetric central volume loss or normal pressure hydrocephalus.   CT FACIAL BONES: 1. Left periorbital and supraorbital soft tissue swelling, without evidence of displaced orbital or facial bone fracture. 2. Large dental cavities and periapical lucencies are present in the few remaining lower incisors. Correlate with dental exam.   CT C-SPINE: 1. No evidence of acute trauma to the cervical spine.   MACRO: None   Signed by: Porsche Read 11/18/2023 12:49 AM Dictation workstation:   [id number]    CT 3D reconstruction    Result  Date: 11/18/2023  Interpreted By:  Porsche Read, STUDY: CT HEAD WO IV CONTRAST; CT CERVICAL SPINE WO IV CONTRAST; CT FACIAL BONES WO IV CONTRAST; CT 3D RECONSTRUCTION;  11/18/2023 12:28 am   INDICATION: Signs/Symptoms:altered mental state; Signs/Symptoms:pain; Signs/Symptoms:contusion; Signs/Symptoms:possible fall contusion.   COMPARISON: None.   ACCESSION NUMBER(S): [id number]; [id number]; [id number]; [id number]   ORDERING CLINICIAN: ROWAN AVILA   TECHNIQUE: Noncontrast axial CT scan of head was performed, with coronal and sagittal reformats provided. The images were reviewed in bone, brain, blood and soft tissue windows.   Thin cut axial CT images through the facial bones were obtained and reconstructed in the coronal and sagittal plane. 3D reconstruction was created on a dedicated workstation and provided for interpretation.   Axial CT images of the cervical spine are obtained. Axial, coronal and sagittal reconstructions are provided for review.   FINDINGS: CT HEAD:   No hyperdense intracranial hemorrhage is evident. There is no mass effect or midline shift.   Gray-white differentiation is intact, without evidence of CT apparent transcortical infarct.   There is somewhat disproportionate enlargement of the supratentorial ventricular system relative to basal cisterns nonspecific finding that may represent asymmetric central volume loss or normal pressure hydrocephalus. No abnormal extra-axial fluid collections are identified. Basal cisterns are patent.   Scalp soft tissues do not demonstrate any acute abnormalities. Calvarium is unremarkable in appearance without evidence of depressed skull fracture. Mastoid air cells and middle ear cavities are well aerated without evidence of fluid fluid levels.   CT FACIAL BONES:   Mild left supraorbital and periorbital soft tissue swelling and edema is present, without evidence of associated fracture. Bony orbits are intact.   Intraorbital structures are  symmetric in appearance without evidence of acute trauma.   Facial bones appear intact without evidence of displaced fracture. Nasal bones are unremarkable in appearance.   Visualized paranasal sinuses are well aerated without evidence of fluid fluid levels.   Soft tissues overlying the face and skull base do not demonstrate any acute abnormality.   There are numerous missing teeth, with dental cavities and periapical lucencies present in the remaining lower incisors. Temporomandibular joints are intact. No trauma to the oral cavity is identified.   Buccal soft tissues do not demonstrate any acute abnormalities. No fluid collections or soft tissue gas is identified. Parotid and submandibular glands are unremarkable in appearance.   CT C-SPINE:   There is straightening of normal lordotic curvature of the cervical spine, with a 1-2 mm anterolisthesis of C4 on C5.   No compression fractures are identified in the cervical spine, although mild insufficiency endplate changes with associated Schmorl's nodes are present along the inferior endplate of C6 and superior endplate of C7.   Posterior elements of the cervical spine do not demonstrate any evidence of acute trauma. No abnormal intra spinous distance widening or displaced transverse or spinous process fractures are identified.   Craniocervical junction is intact. Facet joints are preserved with mild degenerate facet osteoarthropathy present at several levels, most pronounced at C3-C4 and C4-C5 on the left.   Mild intervertebral disc height loss is present at C4-C5 and C6-C7.   No high-grade spinal canal stenosis is identified. Mild spinal canal narrowing is possible at the level of C3-C4 due to mild disc bulge and ligamentum flavum thickening with slight effacement of the cervical spinal cord.   Mild neural foraminal narrowing is present at C4-C5 on the left due to endplate spurring and hypertrophic facet changes.   Prevertebral and paraspinal soft tissues do not  demonstrate any acute abnormalities.       CT HEAD: 1. No evidence of hemorrhage, depressed skull fracture, or other acute intracranial trauma. 2. Disproportionate enlargement of the supratentorial ventricular system relative to basal cisterns and sulci, nonspecific finding that may represent asymmetric central volume loss or normal pressure hydrocephalus.   CT FACIAL BONES: 1. Left periorbital and supraorbital soft tissue swelling, without evidence of displaced orbital or facial bone fracture. 2. Large dental cavities and periapical lucencies are present in the few remaining lower incisors. Correlate with dental exam.   CT C-SPINE: 1. No evidence of acute trauma to the cervical spine.   MACRO: None   Signed by: Porsche Read 11/18/2023 12:49 AM Dictation workstation:   [id number]     Assessment/Plan     Néstor Castro is a 73 y.o. male with past medical history of HF with Improved EF, Atrial fibrillation on Eliquis, non-obstructive coronary artery disease, aortic stenosis, sleep apnea admitted for DKA and Altered mental status.      #Acute Encephalopathy  - DKA resolved; AMS improving  - VBG done shows no evidence of CO2 retention with values of 7.43/29/110   - CTH at the time of presentation was not concerning for intracranial lesion and or CVA   - Continue to monitor for change, if no improvement in 24hrs, may re-scan brain   - CT head repeated on 11/20: no intracranial hemorrhage or mass-effect. Moderate ventriculomegaly raising question for communicating hydrocephalus     #Lung Nodules  - Outpatient Ct in 12 months     #Heart Failure with Improved EF  - Last echocardiogram from 12/2020 showed EF of 55  - Hold Lasix, lisinopril and Jardiance     #Atrial Fibrillation with Rapid Ventricular Response  - restarted PO Metoprolol tartrate 25 twice daily and Flecainide 100 twice daily overnight 11/22  - discontinued heparin gtt; restarted eliquis 5mg bid 11/22  - discontinued IV metoprolol 5mg q6h  (11/20)  - TTE ordered: Poor image quality. Left ventricular systolic function probably normal. Moderate aortic valve stenosis.  -11/23: Stop home aspirin on discharge     #HLD  #Non Obstructive CAD  -Continue aspirin and atorvastatin     #Constipation  -Bisacodyl suppository 10mg     #Acute Kidney Injury, resolved  -Pre renal in nature in nature   -Baseline Creatine< 1  -Cr stable and BUN down-trending     # proteinaceous or hemorrhagic cyst  - noted on CT, Renal US exam is limited without cysts observed  - Urology consulted--> no intervention needed at this time; outpatient follow up     #Rhabdomyolysis  -likely due to fall  - CK 1200  - On D5 nacl 0.45 100 ml/hr--> discontinued     #Hypernatremia  - current sodium of 149 at 5 am (down from 154 at 4pm) decrease of 5mEq over 12 hours  - 1L LR bolus given at 0415; another 1L LR bolus given at 1100  - ordered additional 1L LR bolus for Na 148 this AM (11/22)  - initiate D5W @ 50cc/hr for continued hypernatremia  - recheck Na in the morning, 143 as of 11/23    #Hypophosphatemia  -3.5 11/23      #Diabetic Ketoacidosis  #Uncontrolled Type 2 DM  -Patient last A1C was 11.7 on 03/06  -Home regimen: Lantus 50 unit twice daily, humalog 10 unit before meal, and empagliflozin 10 daily  -Gap closed, BG sitting around 250-260, awake but still NPO  - Discontinue DKA protocol and orders  - insulin changed to: 50 units lantus Sq bid, lispro 10 units tid with meals, and a high SSI (#4)  - Required 4, 12, 12 for SSI on 11/21; will continue current insulin regimen  - Hold Empagliflozin    #Cellulitis   #Leukocytosis   -Likely reactive in the setting of DKA   - Patient noted to have a warm and edematous left leg  - likely in the setting of cellulitis   - deescalated abx from vanc and zosyn to IV cefazolin 1g q8h   - Follow up Bcx: preliminary is normal  - urine culture revealed enteric bacilli - still pending gram stain and sensitivities; 11/23 check RFP in morning and determine if UTI  "symptoms exist, consider treating with antibiotics     #Oral Thrush  -Nystatin swish 11/23; continue 11/24 for thrush    #Cough  -Mucinex 11/23; continue 11/24  -Breathing treatments 11/24       #Fall  #Multiple Bruises  #global weakness  - History of multiple falls  - PT/OT: missed sessions today due to inability to follow commands per PT and OT notes; not medically appropriate for rehab at this time due to continued AMS and inability to consistently follow commands (11/22)  - Monitor bruises for now  - gen surgery consulted for possible debridement of wound on upper abdomen 6 x 23 cm--> intervention not recommended at this time  - wound care on board    #Elevated LFTs  -As of 11/24, , ALT 57  -Monitor LFTs with daily CMPs  -RUQ ultrasound; 1.0 R score indicates cholestatic injury; results show \"no ultrasonographic evidence of acute cholecystitis or biliary dilatation. Diffuse hyper echogenicity of the liver, most consistent with fatty infiltration.\"    #Malnutrition and Weakness  -Ensure and Boost 3x daily    #Respiratory Distress secondary to suspected mucus plugging  -11/24: O2 sat dropped into 70s  -Patient was placed on NRB mask and suctioned, improved to 98%. Continue NRB mask   -CXR  -Venous blood gas  -Chest Physiotherapy   -Administer PRN Tylenol for fever of 38.2 once patient is able to tolerate PO intake and mentation improves    #Motor changes and weakness secondary to stroke vs. CO2 retention  -11/24: Patient's daughter reported weakness and facial drooping; Brain MRI with and without IV contrast; follow up on results     #Prolonged Hospital Stay and Mental/Physical Deconditioning  -Palliative Care Consult        Access: piv  Diet: Advanced to adult diet carb controlled: 60g carb/meal, 30g carb evening snack; pureed 4; Mild thick 2  GI Prophylaxis: none  DVT: SCD and Eliquis  Antibiotics: IV cefazolin 1g q8h  Code: Full code    Violeta Brewster MD  PGY-1    "

## 2023-11-24 NOTE — PROGRESS NOTES
Speech-Language Pathology    Inpatient  Speech-Language Pathology Treatment     Patient Name: Néstor Castro  MRN: [id number]  Today's Date: 11/24/2023        Current Problem:   1. DKA, type 2, not at goal (CMS/HCC)        2. Atrial fibrillation with rapid ventricular response (CMS/HCC)  Transthoracic Echo (TTE) Complete    Transthoracic Echo (TTE) Complete              Plan:  SLP TX Plan: Continue Plan of Care  **Only feed Pt when he is awake and alert.  NPO status may be required if Pt condition does not improve.      Subjective   Pt has increased lethargy with poor voice quality.       General Visit Information:   Prior to Session Communication: Bedside nurse      Therapeutic Swallow:  Following oral care and tactile thermal stimulation, Pt had decreased ability to remain awake and alert.  Pt able to produce effortful swallow in less than 50% of trials.

## 2023-11-24 NOTE — CONSULTS
Reason For Consult  Reason for Consult: GOC     History Of Present Illness  Néstor Castro is a 73 y.o. male with past medical history of CHF with Improved EF, Atrial fibrillation on Eliquis, non obstructive coronary artery disease, aortic stenosis, sleep apnea admitted for DKA and Altered mental status. History was obtained from patient daughter and medical record due to the pt being lethargic. Per daughter, pt last known well was on 11/14.Daughter called him on 11/15 and 11/16 but  she wasn't able to reach him. Daughter  went to his place on 11/17 to do a welfare check,  and she found with his  down on the ground covered with feces and disoriented. EMS was called and pt was brought to the ER here. The pt was found to be in DKA and metabolic encephalopathy, however, despite adequate treatment, the pt's condition has not been stable. Just today, he began desaturating again and was moved to the SDU. Daughter reported pt falling more recently and that the patient has not been able to take care of himself. There is concern from the medical team that pt is not doing well and in fact, may not recover from this illness. Palliative care consulted to determine GOC.      Serious Illness Assessment  Information gathered from daughter Lauren  Understanding of where pt is now with the present illness:  Family shared that they feel pt is dying and has been declining for some time prior to this  How much information about what is likely to be ahead with your illness: All to make informed decision, straight fwd  What are the most important goals if health situation worsens:  Comfort and QOL  What are your biggest fears and worries about the future with pt health:  Denies at this time  What abilities are so critical to your life that you can’t imagine living without them:  [place]  If you become sicker, how much are you willing to go through for the possibility of gaining more time:  Oxygen at current level  How much does  "your family know about pt priorities and wishes:  Daughter verbalized knowing pt wishes and priorities  Code Status Info: Daughter verbalized understanding of resuscitation and that pt would not want that if QOL was not going to be good after this illness    Due to pt surprisingly poor condition upon arrival, author did not extensively delve into the full palliative history    Past Medical History  CHF with Improved EF, Atrial fibrillation on Eliquis, non obstructive coronary artery disease, aortic stenosis, sleep apnea admitted for DKA and Altered mental status    Surgical History  He has no past surgical history on file.     Family History  No family history on file.    Allergies  Patient has no known allergies.    Review of Systems  None other than what is listed in HPI form 10pt review     Physical Exam  Constitutional:       Appearance: He is obese. Ill appearing, lethargic, in some moderate distress of breathing on NRB  HENT:      Head: Normocephalic.   Cardiovascular:      Rate and Rhythm: Tachycardia present. Rhythm irregular.   Respiratory: Labored, tachypneic, using accessory muscles of the abdomen  Abdominal:      General: Abdomen is obese. Bowel sounds are normal.      Comments: Bruises notice on the left side of the abdomen   Musculoskeletal:      Cervical back: Neck supple.      Right lower leg: Edema present.      Left lower leg: Edema present.   Skin:     General: Skin is warm and dry.   Neurological:      Mental Status:  Lethargic, briefly and arouses slightly by barely opening the eyes, but that's it  Psychiatric:         Behavior: calm    Last Recorded Vitals  Blood pressure 152/75, pulse 98, temperature 39.1 °C (102.4 °F), resp. rate 22, height 1.88 m (6' 2\"), weight 131 kg (289 lb 11 oz), SpO2 94 %.    Relevant Results  Results from last 7 days   Lab Units 11/24/23  0634 11/23/23  0620 11/22/23  0500 11/21/23  0959 11/21/23  0511 11/20/23  1618   SODIUM mmol/L 139 143 148* 147*   < > 154* "   POTASSIUM mmol/L 3.8 3.5 3.4* 3.9   < > 3.7   CHLORIDE mmol/L 103 107 112* 115*   < > 117*   CO2 mmol/L 29 27 26 25   < > 26   BUN mg/dL 17 16 21 25*   < > 32*   CREATININE mg/dL 1.05 0.98 0.94 0.99   < > 1.02   CALCIUM mg/dL 8.5* 8.4* 8.7 8.6   < > 9.3   PROTEIN TOTAL g/dL 6.4  --   --  5.7*  --  6.0*   BILIRUBIN TOTAL mg/dL 0.5  --   --  0.6  --  0.5   ALK PHOS U/L 129  --   --  91  --  81   ALT U/L 57*  --   --  29  --  17   AST U/L 109*  --   --  40*  --  21   GLUCOSE mg/dL 88 127* 95 301*   < > 291*    < > = values in this interval not displayed.      Results from last 7 days   Lab Units 11/24/23  0634 11/23/23  0620 11/22/23  0500   WBC AUTO x10*3/uL 10.2 9.0 9.4   HEMOGLOBIN g/dL 11.0* 10.0* 10.4*   HEMATOCRIT % 37.0* 34.0* 34.7*   PLATELETS AUTO x10*3/uL 199 205 221      XR chest 1 view  Result Date: 11/24/2023    US right upper quadrant    Result Date: 11/24/2023  Transthoracic Echo (TTE) Complete    CT head wo IV contrast  Result Date: 11/20/2023    XR chest 1 view  Result Date: 11/19/2023    US renal complete  Result Date: 11/18/2023    CT thoracic spine wo IV contrast  Result Date: 11/18/2023    CT lumbar spine wo IV contrast  Result Date: 11/18/2023    CT chest abdomen pelvis wo IV contrast  Result Date: 11/18/2023    CT head wo IV contrast  Result Date: 11/18/2023    CT cervical spine wo IV contrast  Result Date: 11/18/2023    CT maxillofacial bones wo IV contrast  Result Date: 11/18/2023    Meds Reviewed   Present and historical    Is the patient hospice-eligible?   Yes  Was a discussion held re hospice services?   yes  Was a decision made re hospice services?  Yes    Assessment/Plan   Néstor Castro is a 73 y.o. male with PMH of CHF, Atrial fibrillation on Eliquis, non obstructive coronary artery disease, aortic stenosis, sleep apnea admitted for DKA and metabolic encephalopathy. He is terminally ill and suffering margo EOL symptoms that require in pt mgmt.     GOC: Comfort and QOL  Prognosis:  Poor  Life expectancy: Hours to days  Code Status: Changed to DNR CC    Begin general comfort care  Symptom Management for Active Symptoms  Dyspnea/Pain: Morphine PRN, change to NC 2-4 L when family is ready   Excessive Secretions: Robinul PRN    Other Palliative Support  Hospice Consulted     Discussed with residents and attending physician.    I spent 40 minutes in the professional and overall care of this patient.      Catrina Gillis, APRN-CNP  [employer] Secure Chat

## 2023-11-24 NOTE — CARE PLAN
The patient's goals for the shift include  remaining comfortable.    The clinical goals for the shift include Pts glucose will remain WNL (74-99) by end of shift.

## 2023-11-24 NOTE — PROGRESS NOTES
Physical Therapy                 Therapy Communication Note    Patient Name: Néstor Castro  MRN: [id number]  Today's Date: 11/24/2023     Discipline: Physical Therapy    Missed Visit Reason: Missed Visit Reason: Patient placed on medical hold (Condition remains inappropriate for safe participation in PT assessment. Pt remains lethargic, now with increased O2 demands and pending transfer to stepdown. No eval at 1335; discussed with RN and dtr at bedside.)    Missed Time: Attempt

## 2023-11-24 NOTE — CARE PLAN
The patient's goals for the shift include  sleep    The clinical goals for the shift include Patient will utlize call light when requesting assistance to promote safety    Over the shift, the patient did not make progress toward the following goals. Barriers to progression include. Recommendations to address these barriers include.

## 2023-11-24 NOTE — NURSING NOTE
1900 Assumed care of patient. Patients daughter and grandchildren at bedside.    2015 Nurse present in room to administer scheduled medications. Medications taken per order and without difficulty. IV sites assessed, clean dry and intact. Assessment completed.

## 2023-11-24 NOTE — SIGNIFICANT EVENT
In the afternoon around 1 pm, patient had a reported oxygen saturation dropping into the 70s and a fever of 38.2. He was placed on a NRB mask and suctioned, after which he was back up to 98%. A chest x-ray was obtained, as was a venous blood gas. Chest physiotherapy was ordered due to suspected mucus plugging. An MRI brain was also obtained due to droopy face and motor changes reported by the patient's daughter to rule out stroke vs. CO2 retention. PRN Tylenol was recommended to be given later today once the patient is able to tolerate PO intake. Palliative Care was also consulted for physical and mental deconditioning.

## 2023-11-24 NOTE — CARE PLAN
Rec'd referral from palliative CNP to make referral to [facility] ([facility]).  Referral made to [facility] through [facility].  [facility] to call dtLauren rausch, to set up an appt.

## 2023-11-25 NOTE — CARE PLAN
The patient's goals for the shift include      The clinical goals for the shift include PRN morphine will be used according to RDOS to keep patient comfortable    Over the shift, the patient did not make progress toward the following goals. Barriers to progression include End of life. Recommendations to address these barriers include Comfort care not therapeutic care.    Problem: Skin  Goal: Participates in plan/prevention/treatment measures  Outcome: Not Progressing  Goal: Promote/optimize nutrition  Outcome: Not Progressing  Goal: Promote skin healing  Outcome: Not Progressing

## 2023-11-25 NOTE — DISCHARGE SUMMARY
Discharge Diagnosis  DKA, type 2, not at goal (CMS/[facility])  Multiend organ failure  Physical Deconditioning   Metabolic Encephalopathy  Terminally Ill     Issues Requiring Follow-Up  NONE    Discharge Meds     Your medication list        STOP taking these medications      ammonium lactate 12 % lotion  Commonly known as: Lac-Hydrin        apixaban 5 mg tablet  Commonly known as: Eliquis        atorvastatin 40 mg tablet  Commonly known as: Lipitor        empagliflozin 10 mg  Commonly known as: Jardiance        flecainide 100 mg tablet  Commonly known as: Tambocor        furosemide 80 mg tablet  Commonly known as: Lasix        insulin glargine 100 unit/mL (3 mL) pen  Commonly known as: Lantus        lisinopril 5 mg tablet        magnesium oxide 400 mg tablet  Commonly known as: Mag-Ox        metoprolol tartrate 25 mg tablet  Commonly known as: Lopressor        potassium chloride CR 20 mEq ER tablet  Commonly known as: Klor-Con M20                 Test Results Pending At Discharge  Pending Labs       No current pending labs.            Hospital Course  Néstor Castro is a 73 y.o. malewith past medical history of HF with Improved EF, Atrial fibrillation on Eliquis, non obstructive coronary artery disease, aortic stenosis, sleep apnea admitted for DKA and Altered mental status. Patient was admitted for the management of DKA and started on IVF initially at 250cc/hr 1/2NS. After about 16 hours on the fluid, Gap closed, however there was difficulty transitioning patient to oral as he remained very lethargic and drowsy post gap closure. Fluid was however transitioned to D51/2NS and patient was continued on IVF at 200cc with reduction in the dose of insulin to 3units per hour.     11/20: Repeat of Head CT negative for intracranial hemorrhage or mass effect. DKA protocol discontinued. Initiated intensive insulin order for continued hyperglycemia. Patient transitioned to D5W @ 100cc/hr and 1L bolus LR for hypernatremia (153 mEq  corrected). Discontinued IV metoprolol 5mg q6h. Gave 21mmol Kphos for hypophosphatemia (1.9). Deescalated abx to cefazolin 1g IV for coverage of cellulitis.     11/21: Sodium this morning was 147 (down from 154 on 11/20 at 1618). 2L LR boluses given: one at 0415 and another at 1100. Maintenance fluids discontinued at 0048. Metoprolol tartrate 25mg bid and flecainide 100mg bid restarted due to HR fluctuation overnight. His heparin gtt was discontinued and eliquis 5mg bid restarted due to improvement of CARMELO. His diet was advanced to a nectar thick diet with carb restriction of 60g/meal. Changed insulin regimen to 50 units lantus bid, lispro 10 units tid with meals, and high SSI (#4). General surgery does not recommend intervention regarding his abdominal wound at this time. Request transfer to [address] [place] [address].    11/22: Sodium this morning was 148. Gave 1L LR bolus and initiated D5W @ 50cc/hr. Will recheck in the morning. Repleted potassium and phosphate with 20mEq K and 15mmol kphos. Strength seems to be gradually improving. A&O x2 (to person and time).     11/23: Patient was A&Ox1. Was lethargic. He was noted to have oral thrush and a cough and was started on nystatin.     11/24: In the morning, patient was slightly more responsive than yesterday; oral thrush still present, cough improved. In the afternoon, he had an oxygen saturation dropping into the 70s and a fever of 38.2. He was placed on a NRB mask and suctioned, after which he was back up to 98%. A chest xray was obtained, as was a venous blood gas. Chest physiotherapy was ordered due to suspected mucus plugging. An MRI brain was also obtained due to droopy face and motor changes reported by the patient's daughter to rule out stroke vs. CO2 retention. PRN Tylenol was recommended to be given later today once the patient is able to tolerate PO intake. Palliative Care was also consulted. However, despite adequate treatment, the pt's condition has not been stable. Just  today, he began desaturating again and was moved to the SDU. Daughter reported pt falling more recently and that the patient has not been able to take care of himself. There is concern from the medical team that pt is not doing well and in fact, may not recover from this illness. Palliative care consulted to determine GOC.  Daughter and patient's family agree with pursuing comfort care and beginning symptom management for active symptoms    Pertinent Physical Exam At Time of Discharge  Constitutional:       Appearance: He is obese. Ill appearing, lethargic, in some moderate distress of breathing on NRB  HENT:      Head: Normocephalic.   Cardiovascular:      Rate and Rhythm: Tachycardia present. Rhythm irregular.   Respiratory: Labored, tachypneic, using accessory muscles of the abdomen  Abdominal:      General: Abdomen is obese. Bowel sounds are normal.      Comments: Bruises notice on the left side of the abdomen   Musculoskeletal:      Cervical back: Neck supple.      Right lower leg: Edema present.      Left lower leg: Edema present.   Skin:     General: Skin is warm and dry.   Neurological:      Mental Status: Lethargic, briefly and arouses slightly by barely opening the eyes, but that's it  Psychiatric:         Behavior: calm      Outpatient Follow-Up  No future appointments.      Roberto Bedolla MD

## 2023-11-25 NOTE — CARE PLAN
The patient's goals for the shift include  remain comfortable.    The clinical goals for the shift include Patient will remain comfortable through out the shift.    Over the shift, the patient did not make progress toward the following goals. Barriers to progression include illness. Recommendations to address these barriers include keep patient comfortable.

## 2023-11-25 NOTE — H&P
MAN Castro is a 73 y.o. malewith past medical history of HF with Improved EF, Atrial fibrillation on Eliquis, non obstructive coronary artery disease, aortic stenosis, sleep apnea admitted for DKA and Altered mental status. Patient was admitted for the management of DKA and started on IVF initially at 250cc/hr 1/2NS. After about 16 hours on the fluid, Gap closed, however there was difficulty transitioning patient to oral as he remained very lethargic and drowsy post gap closure. Fluid was however transitioned to D51/2NS and patient was continued on IVF at 200cc with reduction in the dose of insulin to 3units per hour.      11/20: Repeat of Head CT negative for intracranial hemorrhage or mass effect. DKA protocol discontinued. Initiated intensive insulin order for continued hyperglycemia. Patient transitioned to D5W @ 100cc/hr and 1L bolus LR for hypernatremia (153 mEq corrected). Discontinued IV metoprolol 5mg q6h. Gave 21mmol Kphos for hypophosphatemia (1.9). Deescalated abx to cefazolin 1g IV for coverage of cellulitis.      11/21: Sodium this morning was 147 (down from 154 on 11/20 at 1618). 2L LR boluses given: one at 0415 and another at 1100. Maintenance fluids discontinued at 0048. Metoprolol tartrate 25mg bid and flecainide 100mg bid restarted due to HR fluctuation overnight. His heparin gtt was discontinued and eliquis 5mg bid restarted due to improvement of CARMELO. His diet was advanced to a nectar thick diet with carb restriction of 60g/meal. Changed insulin regimen to 50 units lantus bid, lispro 10 units tid with meals, and high SSI (#4). General surgery does not recommend intervention regarding his abdominal wound at this time. Request transfer to [address] [place] [address].     11/22: Sodium this morning was 148. Gave 1L LR bolus and initiated D5W @ 50cc/hr. Will recheck in the morning. Repleted potassium and phosphate with 20mEq K and 15mmol kphos. Strength seems to be gradually improving. A&O x2 (to person  and time).      11/23: Patient was A&Ox1. Was lethargic. He was noted to have oral thrush and a cough and was started on nystatin.      11/24: In the morning, patient was slightly more responsive than yesterday; oral thrush still present, cough improved. In the afternoon, he had an oxygen saturation dropping into the 70s and a fever of 38.2. He was placed on a NRB mask and suctioned, after which he was back up to 98%. A chest xray was obtained, as was a venous blood gas. Chest physiotherapy was ordered due to suspected mucus plugging. An MRI brain was also obtained due to droopy face and motor changes reported by the patient's daughter to rule out stroke vs. CO2 retention. PRN Tylenol was recommended to be given later today once the patient is able to tolerate PO intake. Palliative Care was also consulted. However, despite adequate treatment, the pt's condition has not been stable. Just today, he began desaturating again and was moved to the SDU. Daughter reported pt falling more recently and that the patient has not been able to take care of himself. There is concern from the medical team that pt is not doing well and in fact, may not recover from this illness. Palliative care consulted to determine GOC.  Daughter and patient's family agree with pursuing comfort care and beginning symptom management for active symptoms    Past Medical History   He has no past medical history on file.  Surgical History   No past surgical history on file.  Family History   No family history on file.  Social History     Allergies   No Known Allergies   Meds    Scheduled medications  nystatin, 5 mL, Swish & Spit, 4x daily      Continuous medications  HYDROmorphone,       PRN medications  PRN medications: glycopyrrolate, haloperidol lactate, haloperidol lactate, HYDROmorphone, LORazepam, LORazepam, LORazepam, oxygen   Objective     Vitals  Visit Vitals  Smoking Status Former        Physical Examination:  Constitutional:        Appearance: He is obese. Ill appearing, lethargic, in some moderate distress of breathing on NRB  HENT:      Head: Normocephalic.   Cardiovascular:      Rate and Rhythm: Tachycardia present. Rhythm irregular.   Respiratory: Labored, tachypneic, using accessory muscles of the abdomen  Abdominal:      General: Abdomen is obese. Bowel sounds are normal.      Comments: Bruises notice on the left side of the abdomen   Musculoskeletal:      Cervical back: Neck supple.      Right lower leg: Edema present.      Left lower leg: Edema present.   Skin:     General: Skin is warm and dry.   Neurological:      Mental Status: Lethargic, briefly and arouses slightly by barely opening the eyes, but that's it  Psychiatric:         Behavior: calm    I/Os  No intake or output data in the 24 hours ending 11/25/23 1634    Labs:   Results from last 72 hours   Lab Units 11/24/23  0634 11/23/23  0620   SODIUM mmol/L 139 143   POTASSIUM mmol/L 3.8 3.5   CHLORIDE mmol/L 103 107   CO2 mmol/L 29 27   BUN mg/dL 17 16   CREATININE mg/dL 1.05 0.98   GLUCOSE mg/dL 88 127*   CALCIUM mg/dL 8.5* 8.4*   ANION GAP mmol/L 11 13   EGFR mL/min/1.73m*2 75 82   PHOSPHORUS mg/dL 3.3 3.0      Results from last 72 hours   Lab Units 11/24/23  0634 11/23/23  0620   WBC AUTO x10*3/uL 10.2 9.0   HEMOGLOBIN g/dL 11.0* 10.0*   HEMATOCRIT % 37.0* 34.0*   PLATELETS AUTO x10*3/uL 199 205      Lab Results   Component Value Date    CALCIUM 8.5 (L) 11/24/2023    PHOS 3.3 11/24/2023      Lab Results   Component Value Date    CRP 18.14 (H) 11/19/2023      [unfilled]     Micro/ID:     Lab Results   Component Value Date    URINECULTURE 20,000 - 80,000 Escherichia coli (A) 11/19/2023    BLOODCULT No growth at 4 days -  FINAL REPORT 11/19/2023    BLOODCULT No growth at 4 days -  FINAL REPORT 11/19/2023     Images    XR chest 1 view  Narrative: Interpreted By:  Compa Perez,   STUDY:  XR CHEST 1 VIEW  11/24/2023 1:14 pm      INDICATION:  Signs/Symptoms:newly requiring O2,  previously on RA. Wheezing & SOB      COMPARISON:  11/19/2023      ACCESSION NUMBER(S):  [id number]      ORDERING CLINICIAN:  THOR STRINGER      TECHNIQUE:  A single AP portable radiograph of the chest was obtained.      FINDINGS:  Multiple cardiac monitoring leads are seen over the chest.  Mild  interstitial infiltrates are seen throughout the lungs bilaterally,  and may represent edema and/or pneumonia. No pneumothorax is  identified. The cardiac silhouette is within normal limits for size.      Impression: Diffuse interstitial infiltrates bilaterally, as above. Clinical  correlation and continued follow-up until clearing is recommended.      MACRO:  None.      Signed by: Compa Perez 11/24/2023 1:16 PM  Dictation workstation:   [id number]  US right upper quadrant  Narrative: Interpreted By:  Compa Perez,   STUDY:  US RIGHT UPPER QUADRANT; 11/24/2023 11:56 am      INDICATION:  Signs/Symptoms:elevated LFTs.      COMPARISON:  None.      ACCESSION NUMBER(S):  [id number]      ORDERING CLINICIAN:  THOR STRINGER      TECHNIQUE:  Multiple grayscale ultrasonographic images were obtained through the  right upper quadrant.      FINDINGS:  LIVER:  The liver is grossly unremarkable in appearance, without evidence of  focal masses.      GALLBLADDER:  The gallbladder is nondilated without evidence of gallstones, wall  thickening or pericholecystic fluid.  No ultrasonographic Chacon's  sign was elicited.      BILIARY TREE:  There is no significant intra or extrahepatic biliary dilatation  present, with the common bile duct measuring at up to 5 mm      PANCREAS:  The visualized portions of the pancreas are within normal limits,  without evidence of focal masses.      RIGHT KIDNEY:  Screening evaluation of the right kidney demonstrates no evidence of  hydronephrosis. Cysts are seen in the right kidney.      Impression: 1. No ultrasonographic evidence of acute cholecystitis or biliary  dilatation.  2. Diffuse hyper echogenicity  of the liver, most consistent with  fatty infiltration.      MACRO:  None      Signed by: Compa Perez 11/24/2023 12:27 PM  Dictation workstation:   [id number]    Assessment and Plan      Acute Medical Issues     # End of life care  - Dilaudid PAC + Dilaudid PRN pushes  - Ativan 0.5 mg q4h PRN N/V and anxiety  - Robinul 0.2 mg IVP q4h PRN excess secretions  - Tylenol 650 mg rectal q4h PRN fever >38C  - Bisacodyl rectal 10 mg rectal      Code Status: DNR Comfort Measures Only   Emergency Contact: Extended Emergency Contact Information  Primary Emergency Contact: shavonne cox  Mobile Phone: [phone]  Relation: Daughter   needed? No     Disposition: Admitted to [facility] hospice for end of life care.      Roberto Bedolla MD  Internal Medicine, PGY-3      Disclaimer: Documentation completed with the information available at the time of input. The times in the chart may not be reflective of actual patient care times, interventions, or procedures. Documentation occurs after the physical care of the patient. Dictation technology was used during documentation.

## 2023-11-25 NOTE — SIGNIFICANT EVENT
Called by RN to see the patient for unresponsiveness and pronounce the patient.    DNR Comfort Measures Only    Pt not responsive to verbal or physical stimuli.   Pupils fixed and dilated.  Absent corneal reflexes.  Absent cardiopulmonary sounds, auscultated for >60 seconds.  Absent peripheral pulses, palpated for >60 seconds.    Patient pronounced  at 17:29     <<Family at bedside.>>  <<Family/ Next of Kin made aware.>>      Roberto Bedolla MD  Internal Medicine, PGY-3

## 2023-11-25 NOTE — DISCHARGE SUMMARY
Discharge Diagnosis  DKA, type 2, not at goal (CMS/[facility])  Multiend organ failure  Physical Deconditioning   Metabolic Encephalopathy  Terminally Ill     Issues Requiring Follow-Up  NONE    Discharge Meds     Your medication list      You have not been prescribed any medications.         Test Results Pending At Discharge  Pending Labs       No current pending labs.            Hospital Course  Néstor Castro is a 73 y.o. malewith past medical history of HF with Improved EF, Atrial fibrillation on Eliquis, non obstructive coronary artery disease, aortic stenosis, sleep apnea admitted for DKA and Altered mental status. Patient was admitted for the management of DKA and started on IVF initially at 250cc/hr 1/2NS. After about 16 hours on the fluid, Gap closed, however there was difficulty transitioning patient to oral as he remained very lethargic and drowsy post gap closure. Fluid was however transitioned to D51/2NS and patient was continued on IVF at 200cc with reduction in the dose of insulin to 3units per hour.      11/20: Repeat of Head CT negative for intracranial hemorrhage or mass effect. DKA protocol discontinued. Initiated intensive insulin order for continued hyperglycemia. Patient transitioned to D5W @ 100cc/hr and 1L bolus LR for hypernatremia (153 mEq corrected). Discontinued IV metoprolol 5mg q6h. Gave 21mmol Kphos for hypophosphatemia (1.9). Deescalated abx to cefazolin 1g IV for coverage of cellulitis.      11/21: Sodium this morning was 147 (down from 154 on 11/20 at 1618). 2L LR boluses given: one at 0415 and another at 1100. Maintenance fluids discontinued at 0048. Metoprolol tartrate 25mg bid and flecainide 100mg bid restarted due to HR fluctuation overnight. His heparin gtt was discontinued and eliquis 5mg bid restarted due to improvement of CARMELO. His diet was advanced to a nectar thick diet with carb restriction of 60g/meal. Changed insulin regimen to 50 units lantus bid, lispro 10 units tid with  meals, and high SSI (#4). General surgery does not recommend intervention regarding his abdominal wound at this time. Request transfer to [address] [place] [address].     11/22: Sodium this morning was 148. Gave 1L LR bolus and initiated D5W @ 50cc/hr. Will recheck in the morning. Repleted potassium and phosphate with 20mEq K and 15mmol kphos. Strength seems to be gradually improving. A&O x2 (to person and time).      11/23: Patient was A&Ox1. Was lethargic. He was noted to have oral thrush and a cough and was started on nystatin.      11/24: In the morning, patient was slightly more responsive than yesterday; oral thrush still present, cough improved. In the afternoon, he had an oxygen saturation dropping into the 70s and a fever of 38.2. He was placed on a NRB mask and suctioned, after which he was back up to 98%. A chest xray was obtained, as was a venous blood gas. Chest physiotherapy was ordered due to suspected mucus plugging. An MRI brain was also obtained due to droopy face and motor changes reported by the patient's daughter to rule out stroke vs. CO2 retention. PRN Tylenol was recommended to be given later today once the patient is able to tolerate PO intake. Palliative Care was also consulted. However, despite adequate treatment, the pt's condition has not been stable. Just today, he began desaturating again and was moved to the SDU. Daughter reported pt falling more recently and that the patient has not been able to take care of himself. There is concern from the medical team that pt is not doing well and in fact, may not recover from this illness. Palliative care consulted to determine GOC.  Daughter and patient's family agree with pursuing comfort care and beginning symptom management for active symptoms       Pertinent Physical Exam At Time of Discharge  Pt not responsive to verbal or physical stimuli.   Pupils fixed and dilated.  Absent corneal reflexes.  Absent cardiopulmonary sounds, auscultated for >60  seconds.  Absent peripheral pulses, palpated for >60 seconds.       Outpatient Follow-Up  No future appointments.      Roberto Bedolla MD

## 2023-11-25 NOTE — NURSING NOTE
RN Hospice Note    Néstor Castro is a Hospice Patient.   Hospice terminal diagnosis: Respiratory failure  Physician: Reji  Visit type: Initial Visit: Hospice Admission    Comments/recommendations: Met with pt daughter Lauren and other extended family at bedside.  Presented hospice services and philosophy of care.  In agreement with GIP admission and withdrawal of oxygen, anticipated pt will pass quickly though aware possible pt will linger.  Agreeable to starting dilaudid drip with PRN doses available for comfort.  Dr. Bedolla placing GIP orders.  Recommendation from [facility] NP is dilaudid 2mg/hr continuous with 0.5mg q20min PRN doses available.  GIP process to be completed and withdraw of care will commence once comfort meds are in place.    Discharge Planning:  Patient to be discharged to  [facility]; anticipated to pass at hospital    The following is to be completed:  Discharge order: tbd  State DNR signed by MD: [facility] referral/transfer form: tbd  Medication reconciliation: tbd  PAS/RR or convalescent stay form: tbd  Prescriptions for al narcotics/new medications: tbd  Transportation: tbd  Other: n/a    Plan of care reviewed with patient/family members Lauren melita Castro   Plan of care reviewed with hospital staff members: Amalia Patel RN / Tonya Sow RN CM     Please notify [facility] of any changes in condition. Thank you.  Office: [phone] (8 am-6:30 pm M-F and 8 am-4:30 pm weekends and holidays)   [phone] (6:30 pm-8 am M-F and 4:30 pm-8 am weekends and holidays)    Shira Lehman, RN

## 2023-11-26 NOTE — NURSING NOTE
[place] [facility] [place]  [facility]  [facility] to confirm if pt is released from their standpoint, waiting callback.  Spoke to [facility] and pt is on hold for them  Referral #[id number]    Pt did not receive any ivf last hour of life.

## 2023-11-28 LAB — GLUCOSE BLD MANUAL STRIP-MCNC: 587 MG/DL (ref 74–99)

## 2023-11-30 ENCOUNTER — HOSPITAL ENCOUNTER (OUTPATIENT)
Dept: CARDIOLOGY | Facility: HOSPITAL | Age: 73
Discharge: HOME | End: 2023-11-30
Payer: MEDICARE

## 2023-11-30 PROCEDURE — 93005 ELECTROCARDIOGRAM TRACING: CPT

## 2023-11-30 PROCEDURE — 93005 ELECTROCARDIOGRAM TRACING: CPT | Mod: 76

## 2023-12-29 LAB
ATRIAL RATE: 119 BPM
ATRIAL RATE: 163 BPM
Q ONSET: 190 MS
Q ONSET: 191 MS
QRS COUNT: 23 BEATS
QRS COUNT: 24 BEATS
QRS DURATION: 136 MS
QRS DURATION: 140 MS
QT INTERVAL: 358 MS
QT INTERVAL: 366 MS
QTC CALCULATION(BAZETT): 557 MS
QTC CALCULATION(BAZETT): 560 MS
QTC FREDERICIA: 481 MS
QTC FREDERICIA: 486 MS
R AXIS: -14 DEGREES
R AXIS: 1 DEGREES
T AXIS: -30 DEGREES
T AXIS: -60 DEGREES
T OFFSET: 370 MS
T OFFSET: 373 MS
VENTRICULAR RATE: 141 BPM
VENTRICULAR RATE: 146 BPM

## 2024-03-10 LAB
ATRIAL RATE: 123 BPM
ATRIAL RATE: 150 BPM
P AXIS: 50 DEGREES
P OFFSET: 181 MS
P ONSET: 125 MS
PR INTERVAL: 138 MS
Q ONSET: 194 MS
Q ONSET: 226 MS
QRS COUNT: 20 BEATS
QRS COUNT: 26 BEATS
QRS DURATION: 124 MS
QRS DURATION: 132 MS
QT INTERVAL: 298 MS
QT INTERVAL: 348 MS
QTC CALCULATION(BAZETT): 475 MS
QTC CALCULATION(BAZETT): 498 MS
QTC FREDERICIA: 407 MS
QTC FREDERICIA: 441 MS
R AXIS: 55 DEGREES
R AXIS: 64 DEGREES
T AXIS: 119 DEGREES
T AXIS: 225 DEGREES
T OFFSET: 368 MS
T OFFSET: 375 MS
VENTRICULAR RATE: 123 BPM
VENTRICULAR RATE: 153 BPM